# Patient Record
Sex: MALE | Race: WHITE | NOT HISPANIC OR LATINO | Employment: OTHER | URBAN - METROPOLITAN AREA
[De-identification: names, ages, dates, MRNs, and addresses within clinical notes are randomized per-mention and may not be internally consistent; named-entity substitution may affect disease eponyms.]

---

## 2017-02-21 ENCOUNTER — AMBULATORY - HEALTHEAST (OUTPATIENT)
Dept: CARDIOLOGY | Facility: CLINIC | Age: 72
End: 2017-02-21

## 2017-02-21 DIAGNOSIS — Z95.0 CARDIAC PACEMAKER IN SITU: ICD-10-CM

## 2017-05-04 ENCOUNTER — AMBULATORY - HEALTHEAST (OUTPATIENT)
Dept: CARDIOLOGY | Facility: CLINIC | Age: 72
End: 2017-05-04

## 2017-05-04 ENCOUNTER — OFFICE VISIT - HEALTHEAST (OUTPATIENT)
Dept: CARDIOLOGY | Facility: CLINIC | Age: 72
End: 2017-05-04

## 2017-05-04 DIAGNOSIS — I48.21 PERMANENT ATRIAL FIBRILLATION (H): ICD-10-CM

## 2017-05-04 DIAGNOSIS — Z95.0 CARDIAC PACEMAKER IN SITU: ICD-10-CM

## 2017-05-04 DIAGNOSIS — I35.1 AORTIC VALVE INSUFFICIENCY, UNSPECIFIED ETIOLOGY: ICD-10-CM

## 2017-05-04 DIAGNOSIS — Z95.2 S/P AVR (AORTIC VALVE REPLACEMENT): ICD-10-CM

## 2017-05-04 ASSESSMENT — MIFFLIN-ST. JEOR: SCORE: 1750.51

## 2017-05-12 ENCOUNTER — HOSPITAL ENCOUNTER (OUTPATIENT)
Dept: CARDIOLOGY | Facility: HOSPITAL | Age: 72
Discharge: HOME | End: 2017-05-12
Attending: INTERNAL MEDICINE

## 2017-05-12 DIAGNOSIS — Z95.2 S/P AVR (AORTIC VALVE REPLACEMENT): ICD-10-CM

## 2017-05-12 DIAGNOSIS — I48.21 PERMANENT ATRIAL FIBRILLATION (H): ICD-10-CM

## 2017-05-12 DIAGNOSIS — I35.1 AORTIC VALVE INSUFFICIENCY, UNSPECIFIED ETIOLOGY: ICD-10-CM

## 2017-05-12 LAB
AORTIC ROOT: 4.1 CM
AORTIC VALVE MEAN VELOCITY: 141 CM/S
AR DECEL SLOPE: 2950 MM/S2
AR PEAK VELOCITY: 416 CM/S
AV DIMENSIONLESS INDEX VTI: 0.5
AV MEAN GRADIENT: 9 MMHG
AV PEAK GRADIENT: 13.4 MMHG
AV REGURGITANT PEAK GRADIENT: 69.2 MMHG
AV REGURGITATION PRESSURE HALF TIME: 413 MS
AV VALVE AREA: 1.8 CM2
AV VELOCITY RATIO: 0.5
BSA FOR ECHO PROCEDURE: 2.23 M2
CV BLOOD PRESSURE: NORMAL MMHG
CV ECHO HEIGHT: 65 IN
CV ECHO WEIGHT: 240 LBS
DOP CALC AO PEAK VEL: 183 CM/S
DOP CALC AO VTI: 41.2 CM
DOP CALC LVOT AREA: 3.8 CM2
DOP CALC LVOT DIAMETER: 2.2 CM
DOP CALC LVOT PEAK VEL: 84.2 CM/S
DOP CALC LVOT STROKE VOLUME: 74.5 CM3
DOP CALCLVOT PEAK VEL VTI: 19.6 CM
EJECTION FRACTION: 65 % (ref 55–75)
FRACTIONAL SHORTENING: 37.3 % (ref 28–44)
INTERVENTRICULAR SEPTUM IN END DIASTOLE: 1.83 CM (ref 0.6–1)
IVS/PW RATIO: 1
LA AREA 1: 26.6 CM2
LA AREA 2: 27.8 CM2
LEFT ATRIUM LENGTH: 6.2 CM
LEFT ATRIUM SIZE: 5.4 CM
LEFT ATRIUM TO AORTIC ROOT RATIO: 1.32 NO UNITS
LEFT ATRIUM VOLUME INDEX: 45.5 ML/M2
LEFT ATRIUM VOLUME: 101.4 CM3
LEFT VENTRICLE CARDIAC INDEX: 2 L/MIN/M2
LEFT VENTRICLE CARDIAC OUTPUT: 4.5 L/MIN
LEFT VENTRICLE DIASTOLIC VOLUME INDEX: 95.5 CM3/M2 (ref 34–74)
LEFT VENTRICLE DIASTOLIC VOLUME: 213 CM3 (ref 62–150)
LEFT VENTRICLE HEART RATE: 60 BPM
LEFT VENTRICLE MASS INDEX: 238.5 G/M2
LEFT VENTRICLE SYSTOLIC VOLUME INDEX: 33.6 CM3/M2 (ref 11–31)
LEFT VENTRICLE SYSTOLIC VOLUME: 74.9 CM3 (ref 21–61)
LEFT VENTRICULAR INTERNAL DIMENSION IN DIASTOLE: 5.69 CM (ref 4.2–5.8)
LEFT VENTRICULAR INTERNAL DIMENSION IN SYSTOLE: 3.57 CM (ref 2.5–4)
LEFT VENTRICULAR MASS: 531.8 G
LEFT VENTRICULAR OUTFLOW TRACT MEAN GRADIENT: 2 MMHG
LEFT VENTRICULAR OUTFLOW TRACT MEAN VELOCITY: 59.6 CM/S
LEFT VENTRICULAR OUTFLOW TRACT PEAK GRADIENT: 3 MMHG
LEFT VENTRICULAR POSTERIOR WALL IN END DIASTOLE: 1.85 CM (ref 0.6–1)
LV STROKE VOLUME INDEX: 33.4 ML/M2
MITRAL VALVE E/A RATIO: 3.8
MV AVERAGE E/E' RATIO: 18.6 CM/S
MV DECELERATION TIME: 204 MS
MV E'TISSUE VEL-LAT: 11 CM/S
MV E'TISSUE VEL-MED: 6.67 CM/S
MV LATERAL E/E' RATIO: 14.9
MV MEDIAL E/E' RATIO: 24.6
MV PEAK A VELOCITY: 42.6 CM/S
MV PEAK E VELOCITY: 164 CM/S
NUC REST DIASTOLIC VOLUME INDEX: 3840 LBS
NUC REST SYSTOLIC VOLUME INDEX: 65 IN
RIGHT VENTRICULAR INTERNAL DIMENSION IN DYSTOLE: 3.09 CM

## 2017-05-12 ASSESSMENT — MIFFLIN-ST. JEOR: SCORE: 1750.51

## 2017-08-01 ENCOUNTER — AMBULATORY - HEALTHEAST (OUTPATIENT)
Dept: CARDIOLOGY | Facility: CLINIC | Age: 72
End: 2017-08-01

## 2017-08-01 DIAGNOSIS — Z95.0 CARDIAC PACEMAKER IN SITU: ICD-10-CM

## 2017-08-01 LAB — HCC DEVICE COMMENTS: NORMAL

## 2017-11-08 ENCOUNTER — AMBULATORY - HEALTHEAST (OUTPATIENT)
Dept: CARDIOLOGY | Facility: CLINIC | Age: 72
End: 2017-11-08

## 2017-11-08 DIAGNOSIS — Z95.0 PACEMAKER: ICD-10-CM

## 2017-11-08 LAB — HCC DEVICE COMMENTS: NORMAL

## 2018-02-17 ENCOUNTER — AMBULATORY - HEALTHEAST (OUTPATIENT)
Dept: CARDIOLOGY | Facility: CLINIC | Age: 73
End: 2018-02-17

## 2018-02-17 DIAGNOSIS — Z95.0 CARDIAC PACEMAKER IN SITU: ICD-10-CM

## 2018-02-20 LAB — HCC DEVICE COMMENTS: NORMAL

## 2018-05-17 ENCOUNTER — OFFICE VISIT - HEALTHEAST (OUTPATIENT)
Dept: CARDIOLOGY | Facility: CLINIC | Age: 73
End: 2018-05-17

## 2018-05-17 ENCOUNTER — AMBULATORY - HEALTHEAST (OUTPATIENT)
Dept: CARDIOLOGY | Facility: CLINIC | Age: 73
End: 2018-05-17

## 2018-05-17 DIAGNOSIS — I35.1 AORTIC INSUFFICIENCY: ICD-10-CM

## 2018-05-17 DIAGNOSIS — Z95.0 CARDIAC PACEMAKER IN SITU: ICD-10-CM

## 2018-05-17 DIAGNOSIS — Z95.2 S/P AVR (AORTIC VALVE REPLACEMENT): ICD-10-CM

## 2018-05-17 DIAGNOSIS — I48.21 PERMANENT ATRIAL FIBRILLATION (H): ICD-10-CM

## 2018-05-17 LAB
ERYTHROCYTE [DISTWIDTH] IN BLOOD BY AUTOMATED COUNT: 14.6 % (ref 11–14.5)
HCT VFR BLD AUTO: 41.2 % (ref 40–54)
HGB BLD-MCNC: 14 G/DL (ref 14–18)
MCH RBC QN AUTO: 31.7 PG (ref 27–34)
MCHC RBC AUTO-ENTMCNC: 34 G/DL (ref 32–36)
MCV RBC AUTO: 93 FL (ref 80–100)
PLATELET # BLD AUTO: 202 THOU/UL (ref 140–440)
PMV BLD AUTO: 10.5 FL (ref 8.5–12.5)
RBC # BLD AUTO: 4.42 MILL/UL (ref 4.4–6.2)
WBC: 7.3 THOU/UL (ref 4–11)

## 2018-05-17 ASSESSMENT — MIFFLIN-ST. JEOR: SCORE: 1709.69

## 2018-06-14 ENCOUNTER — HOSPITAL ENCOUNTER (OUTPATIENT)
Dept: CARDIOLOGY | Facility: HOSPITAL | Age: 73
Discharge: HOME | End: 2018-06-14
Attending: INTERNAL MEDICINE

## 2018-06-14 DIAGNOSIS — Z95.2 S/P AVR (AORTIC VALVE REPLACEMENT): ICD-10-CM

## 2018-06-14 DIAGNOSIS — I35.1 AORTIC INSUFFICIENCY: ICD-10-CM

## 2018-06-14 ASSESSMENT — MIFFLIN-ST. JEOR: SCORE: 1709.69

## 2018-06-15 LAB
AORTIC ROOT: 4.9 CM
AORTIC VALVE MEAN VELOCITY: 148 CM/S
AR DECEL SLOPE: 2890 MM/S2
AR PEAK VELOCITY: 407 CM/S
ASCENDING AORTA: 5 CM
AV DIMENSIONLESS INDEX VTI: 0.5
AV MEAN GRADIENT: 10 MMHG
AV PEAK GRADIENT: 21 MMHG
AV REGURGITANT PEAK GRADIENT: 66.3 MMHG
AV REGURGITATION PRESSURE HALF TIME: 418 MS
AV VALVE AREA: 2 CM2
BSA FOR ECHO PROCEDURE: 2.19 M2
CV BLOOD PRESSURE: NORMAL MMHG
CV ECHO HEIGHT: 65 IN
CV ECHO WEIGHT: 231 LBS
DOP CALC AO PEAK VEL: 229 CM/S
DOP CALC AO VTI: 34.8 CM
DOP CALC LVOT AREA: 4.15 CM2
DOP CALC LVOT DIAMETER: 2.3 CM
DOP CALC LVOT STROKE VOLUME: 71 CM3
DOP CALCLVOT PEAK VEL VTI: 17.1 CM
ECHO EJECTION FRACTION ESTIMATED: 55 %
EJECTION FRACTION: 53 % (ref 55–75)
FRACTIONAL SHORTENING: 38.7 % (ref 28–44)
INTERVENTRICULAR SEPTUM IN END DIASTOLE: 1.2 CM (ref 0.6–1)
IVS/PW RATIO: 0.9
LA AREA 1: 27.1 CM2
LA AREA 2: 26.2 CM2
LEFT ATRIUM LENGTH: 6.6 CM
LEFT ATRIUM SIZE: 5.7 CM
LEFT ATRIUM TO AORTIC ROOT RATIO: 1.02 NO UNITS
LEFT ATRIUM VOLUME INDEX: 41.8 ML/M2
LEFT ATRIUM VOLUME: 91.4 ML
LEFT VENTRICLE CARDIAC INDEX: 1.9 L/MIN/M2
LEFT VENTRICLE CARDIAC OUTPUT: 4.3 L/MIN
LEFT VENTRICLE DIASTOLIC VOLUME INDEX: 79 CM3/M2 (ref 34–74)
LEFT VENTRICLE DIASTOLIC VOLUME: 173 CM3 (ref 62–150)
LEFT VENTRICLE HEART RATE: 60 BPM
LEFT VENTRICLE MASS INDEX: 159.9 G/M2
LEFT VENTRICLE SYSTOLIC VOLUME INDEX: 37 CM3/M2 (ref 11–31)
LEFT VENTRICLE SYSTOLIC VOLUME: 81 CM3 (ref 21–61)
LEFT VENTRICULAR INTERNAL DIMENSION IN DIASTOLE: 6.2 CM (ref 4.2–5.8)
LEFT VENTRICULAR INTERNAL DIMENSION IN SYSTOLE: 3.8 CM (ref 2.5–4)
LEFT VENTRICULAR MASS: 350.2 G
LEFT VENTRICULAR OUTFLOW TRACT MEAN GRADIENT: 1 MMHG
LEFT VENTRICULAR OUTFLOW TRACT MEAN VELOCITY: 57.9 CM/S
LEFT VENTRICULAR POSTERIOR WALL IN END DIASTOLE: 1.3 CM (ref 0.6–1)
LV STROKE VOLUME INDEX: 32.4 ML/M2
MITRAL VALVE E/A RATIO: 3.1
MV AVERAGE E/E' RATIO: 23.2 CM/S
MV DECELERATION TIME: 204 MS
MV E'TISSUE VEL-LAT: 6.75 CM/S
MV E'TISSUE VEL-MED: 4.81 CM/S
MV LATERAL E/E' RATIO: 19.9
MV MEDIAL E/E' RATIO: 27.9
MV PEAK A VELOCITY: 43.3 CM/S
MV PEAK E VELOCITY: 134 CM/S
NUC REST DIASTOLIC VOLUME INDEX: 3696 LBS
NUC REST SYSTOLIC VOLUME INDEX: 65 IN
TRICUSPID REGURGITATION PEAK PRESSURE GRADIENT: 15.8 MMHG
TRICUSPID VALVE PEAK REGURGITANT VELOCITY: 199 CM/S

## 2018-06-18 ENCOUNTER — AMBULATORY - HEALTHEAST (OUTPATIENT)
Dept: CARDIOLOGY | Facility: CLINIC | Age: 73
End: 2018-06-18

## 2018-06-18 DIAGNOSIS — R93.89 ABNORMAL FINDINGS ON DIAGNOSTIC IMAGING OF OTHER SPECIFIED BODY STRUCTURES: ICD-10-CM

## 2018-06-18 DIAGNOSIS — I77.89 ENLARGED AORTA (H): ICD-10-CM

## 2018-06-22 ENCOUNTER — COMMUNICATION - HEALTHEAST (OUTPATIENT)
Dept: CARDIOLOGY | Facility: CLINIC | Age: 73
End: 2018-06-22

## 2018-06-22 DIAGNOSIS — I77.89 ASCENDING AORTA ENLARGEMENT (H): ICD-10-CM

## 2018-06-22 DIAGNOSIS — Z51.81 ENCOUNTER FOR THERAPEUTIC DRUG MONITORING: ICD-10-CM

## 2018-06-27 ENCOUNTER — AMBULATORY - HEALTHEAST (OUTPATIENT)
Dept: CARDIOLOGY | Facility: CLINIC | Age: 73
End: 2018-06-27

## 2018-07-05 ENCOUNTER — HOSPITAL ENCOUNTER (OUTPATIENT)
Dept: CT IMAGING | Facility: HOSPITAL | Age: 73
Discharge: HOME | End: 2018-07-05
Attending: INTERNAL MEDICINE

## 2018-07-05 DIAGNOSIS — I77.89 ASCENDING AORTA ENLARGEMENT (H): ICD-10-CM

## 2018-07-05 LAB — CREAT BLD-MCNC: 1.2 MG/DL

## 2018-08-15 ENCOUNTER — AMBULATORY - HEALTHEAST (OUTPATIENT)
Dept: CARDIOLOGY | Facility: CLINIC | Age: 73
End: 2018-08-15

## 2018-08-15 DIAGNOSIS — Z95.0 PACEMAKER: ICD-10-CM

## 2018-08-15 LAB
HCC DEVICE COMMENTS: NORMAL
HCC DEVICE IMPLANTING PROVIDER: NORMAL
HCC DEVICE MANUFACTURE: NORMAL
HCC DEVICE MODEL: NORMAL
HCC DEVICE SERIAL NUMBER: NORMAL
HCC DEVICE TYPE: NORMAL

## 2018-10-25 ENCOUNTER — COMMUNICATION - HEALTHEAST (OUTPATIENT)
Dept: ADMINISTRATIVE | Facility: CLINIC | Age: 73
End: 2018-10-25

## 2018-11-20 ENCOUNTER — AMBULATORY - HEALTHEAST (OUTPATIENT)
Dept: CARDIOLOGY | Facility: CLINIC | Age: 73
End: 2018-11-20

## 2018-11-20 DIAGNOSIS — Z95.0 CARDIAC PACEMAKER IN SITU: ICD-10-CM

## 2019-02-26 ENCOUNTER — AMBULATORY - HEALTHEAST (OUTPATIENT)
Dept: CARDIOLOGY | Facility: CLINIC | Age: 74
End: 2019-02-26

## 2019-02-26 DIAGNOSIS — Z95.0 PACEMAKER: ICD-10-CM

## 2019-05-29 ENCOUNTER — AMBULATORY - HEALTHEAST (OUTPATIENT)
Dept: CARDIOLOGY | Facility: CLINIC | Age: 74
End: 2019-05-29

## 2019-05-29 DIAGNOSIS — Z95.0 PACEMAKER: ICD-10-CM

## 2019-05-29 ASSESSMENT — MIFFLIN-ST. JEOR: SCORE: 1628.04

## 2019-08-22 ENCOUNTER — AMBULATORY - HEALTHEAST (OUTPATIENT)
Dept: CARDIOLOGY | Facility: CLINIC | Age: 74
End: 2019-08-22

## 2019-08-22 DIAGNOSIS — Z95.0 PACEMAKER: ICD-10-CM

## 2019-11-25 ENCOUNTER — AMBULATORY - HEALTHEAST (OUTPATIENT)
Dept: CARDIOLOGY | Facility: CLINIC | Age: 74
End: 2019-11-25

## 2019-11-25 DIAGNOSIS — I44.2 ATRIOVENTRICULAR BLOCK, COMPLETE (H): ICD-10-CM

## 2019-11-25 DIAGNOSIS — Z95.0 PACEMAKER: ICD-10-CM

## 2019-11-27 ENCOUNTER — OFFICE VISIT - HEALTHEAST (OUTPATIENT)
Dept: CARDIOLOGY | Facility: CLINIC | Age: 74
End: 2019-11-27

## 2019-11-27 DIAGNOSIS — I35.1 AORTIC VALVE INSUFFICIENCY, ETIOLOGY OF CARDIAC VALVE DISEASE UNSPECIFIED: ICD-10-CM

## 2019-11-27 DIAGNOSIS — Z95.2 S/P AVR (AORTIC VALVE REPLACEMENT): ICD-10-CM

## 2019-11-27 DIAGNOSIS — I44.2 ATRIOVENTRICULAR BLOCK, COMPLETE (H): ICD-10-CM

## 2019-11-27 DIAGNOSIS — I48.21 PERMANENT ATRIAL FIBRILLATION (H): ICD-10-CM

## 2019-11-27 DIAGNOSIS — Z95.0 CARDIAC PACEMAKER IN SITU: ICD-10-CM

## 2019-11-27 ASSESSMENT — MIFFLIN-ST. JEOR: SCORE: 1564.54

## 2019-11-29 ENCOUNTER — AMBULATORY - HEALTHEAST (OUTPATIENT)
Dept: CARDIOLOGY | Facility: CLINIC | Age: 74
End: 2019-11-29

## 2019-12-03 ENCOUNTER — COMMUNICATION - HEALTHEAST (OUTPATIENT)
Dept: CARDIOLOGY | Facility: CLINIC | Age: 74
End: 2019-12-03

## 2019-12-03 DIAGNOSIS — R60.0 PERIPHERAL EDEMA: ICD-10-CM

## 2019-12-11 ENCOUNTER — AMBULATORY - HEALTHEAST (OUTPATIENT)
Dept: CARDIOLOGY | Facility: CLINIC | Age: 74
End: 2019-12-11

## 2019-12-11 DIAGNOSIS — I44.2 ATRIOVENTRICULAR BLOCK, COMPLETE (H): ICD-10-CM

## 2019-12-11 DIAGNOSIS — Z95.0 PACEMAKER: ICD-10-CM

## 2019-12-11 RX ORDER — LISINOPRIL AND HYDROCHLOROTHIAZIDE 12.5; 2 MG/1; MG/1
1 TABLET ORAL DAILY
Refills: 0 | Status: SHIPPED | COMMUNITY
Start: 2019-10-09

## 2019-12-11 ASSESSMENT — MIFFLIN-ST. JEOR: SCORE: 1569.07

## 2019-12-17 ENCOUNTER — HOSPITAL ENCOUNTER (OUTPATIENT)
Dept: CARDIOLOGY | Facility: CLINIC | Age: 74
Discharge: HOME | End: 2019-12-17
Attending: INTERNAL MEDICINE

## 2019-12-17 DIAGNOSIS — Z95.2 S/P AVR (AORTIC VALVE REPLACEMENT): ICD-10-CM

## 2019-12-17 DIAGNOSIS — I35.1 AORTIC VALVE INSUFFICIENCY, ETIOLOGY OF CARDIAC VALVE DISEASE UNSPECIFIED: ICD-10-CM

## 2019-12-17 DIAGNOSIS — I48.21 PERMANENT ATRIAL FIBRILLATION (H): ICD-10-CM

## 2019-12-17 ASSESSMENT — MIFFLIN-ST. JEOR: SCORE: 1569.07

## 2019-12-18 LAB
AORTIC ROOT: 5.5 CM
AORTIC VALVE MEAN VELOCITY: 127 CM/S
AR DECEL SLOPE: 2740 MM/S2
AR PEAK VELOCITY: 395 CM/S
ASCENDING AORTA: 5.1 CM
AV DIMENSIONLESS INDEX VTI: 0.6
AV MEAN GRADIENT: 8 MMHG
AV PEAK GRADIENT: 14.9 MMHG
AV REGURGITANT PEAK GRADIENT: 62.4 MMHG
AV REGURGITATION PRESSURE HALF TIME: 430 MS
AV VALVE AREA: 1.7 CM2
AV VELOCITY RATIO: 0.5
BSA FOR ECHO PROCEDURE: 2.04 M2
CV BLOOD PRESSURE: ABNORMAL MMHG
CV ECHO HEIGHT: 65 IN
CV ECHO WEIGHT: 200 LBS
DOP CALC AO PEAK VEL: 193 CM/S
DOP CALC AO VTI: 36.9 CM
DOP CALC LVOT AREA: 2.83 CM2
DOP CALC LVOT DIAMETER: 1.9 CM
DOP CALC LVOT PEAK VEL: 96.1 CM/S
DOP CALC LVOT STROKE VOLUME: 64.3 CM3
DOP CALCLVOT PEAK VEL VTI: 22.7 CM
EJECTION FRACTION: 47 % (ref 55–75)
FRACTIONAL SHORTENING: 34.4 % (ref 28–44)
INTERVENTRICULAR SEPTUM IN END DIASTOLE: 1.34 CM (ref 0.6–1)
IVS/PW RATIO: 1.1
LA AREA 1: 34.6 CM2
LA AREA 2: 33 CM2
LEFT ATRIUM LENGTH: 7.26 CM
LEFT ATRIUM SIZE: 4.4 CM
LEFT ATRIUM SIZE: 4.4 CM
LEFT ATRIUM TO AORTIC ROOT RATIO: 0.9 NO UNITS
LEFT ATRIUM VOLUME INDEX: 65.5 ML/M2
LEFT ATRIUM VOLUME: 133.7 ML
LEFT VENTRICLE CARDIAC INDEX: 2.1 L/MIN/M2
LEFT VENTRICLE CARDIAC OUTPUT: 4.2 L/MIN
LEFT VENTRICLE DIASTOLIC VOLUME INDEX: 132.8 CM3/M2 (ref 34–74)
LEFT VENTRICLE DIASTOLIC VOLUME: 271 CM3 (ref 62–150)
LEFT VENTRICLE HEART RATE: 66 BPM
LEFT VENTRICLE MASS INDEX: 169.8 G/M2
LEFT VENTRICLE SYSTOLIC VOLUME INDEX: 70.6 CM3/M2 (ref 11–31)
LEFT VENTRICLE SYSTOLIC VOLUME: 144 CM3 (ref 21–61)
LEFT VENTRICULAR INTERNAL DIMENSION IN DIASTOLE: 5.98 CM (ref 4.2–5.8)
LEFT VENTRICULAR INTERNAL DIMENSION IN SYSTOLE: 3.92 CM (ref 2.5–4)
LEFT VENTRICULAR MASS: 346.4 G
LEFT VENTRICULAR OUTFLOW TRACT MEAN GRADIENT: 2 MMHG
LEFT VENTRICULAR OUTFLOW TRACT MEAN VELOCITY: 64.1 CM/S
LEFT VENTRICULAR OUTFLOW TRACT PEAK GRADIENT: 4 MMHG
LEFT VENTRICULAR POSTERIOR WALL IN END DIASTOLE: 1.25 CM (ref 0.6–1)
LV STROKE VOLUME INDEX: 31.5 ML/M2
MITRAL REGURGITANT VELOCITY TIME INTEGRAL: 187 CM
MR FLOW: 43 CM3
MR MEAN GRADIENT: 78 MMHG
MR MEAN VELOCITY: 415 CM/S
MR PEAK GRADIENT: 117.9 MMHG
MR PISA EROA: 0.2 CM2
MR PISA RADIUS: 0.8 CM
MR PISA VN NYQUIST: 30.8 CM/S
MV AVERAGE E/E' RATIO: 20.3 CM/S
MV DECELERATION TIME: 158 MS
MV E'TISSUE VEL-LAT: 9.16 CM/S
MV E'TISSUE VEL-MED: 5.65 CM/S
MV LATERAL E/E' RATIO: 16.4
MV MEDIAL E/E' RATIO: 26.5
MV PEAK E VELOCITY: 150 CM/S
MV REGURGITANT VOLUME: 42.6 CC
NUC REST DIASTOLIC VOLUME INDEX: 3200 LBS
NUC REST SYSTOLIC VOLUME INDEX: 65 IN
PISA MR PEAK VEL: 543 CM/S
PR MAX PG: 3 MMHG
PR PEAK VELOCITY: 90.9 CM/S
TRICUSPID REGURGITATION PEAK PRESSURE GRADIENT: 42 MMHG
TRICUSPID VALVE ANULAR PLANE SYSTOLIC EXCURSION: 1.2 CM
TRICUSPID VALVE PEAK REGURGITANT VELOCITY: 324 CM/S

## 2019-12-24 ENCOUNTER — COMMUNICATION - HEALTHEAST (OUTPATIENT)
Dept: CARDIOLOGY | Facility: CLINIC | Age: 74
End: 2019-12-24

## 2019-12-24 DIAGNOSIS — I77.89 ASCENDING AORTA ENLARGEMENT (H): ICD-10-CM

## 2020-01-03 ENCOUNTER — HOSPITAL ENCOUNTER (OUTPATIENT)
Dept: CT IMAGING | Facility: CLINIC | Age: 75
Discharge: HOME | End: 2020-01-03
Attending: INTERNAL MEDICINE

## 2020-01-03 DIAGNOSIS — I77.89 ASCENDING AORTA ENLARGEMENT (H): ICD-10-CM

## 2020-01-03 LAB
CREAT BLD-MCNC: 1 MG/DL (ref 0.7–1.3)
GFR SERPL CREATININE-BSD FRML MDRD: >60 ML/MIN/1.73M2

## 2020-02-06 ENCOUNTER — AMBULATORY - HEALTHEAST (OUTPATIENT)
Dept: CARDIOLOGY | Facility: CLINIC | Age: 75
End: 2020-02-06

## 2020-02-06 ENCOUNTER — COMMUNICATION - HEALTHEAST (OUTPATIENT)
Dept: CARDIOLOGY | Facility: CLINIC | Age: 75
End: 2020-02-06

## 2020-02-06 DIAGNOSIS — I47.29 NSVT (NONSUSTAINED VENTRICULAR TACHYCARDIA) (H): ICD-10-CM

## 2020-02-06 DIAGNOSIS — Z95.0 PACEMAKER: ICD-10-CM

## 2020-02-06 DIAGNOSIS — I44.2 ATRIOVENTRICULAR BLOCK, COMPLETE (H): ICD-10-CM

## 2020-02-07 ENCOUNTER — AMBULATORY - HEALTHEAST (OUTPATIENT)
Dept: CARDIOLOGY | Facility: CLINIC | Age: 75
End: 2020-02-07

## 2020-02-14 ENCOUNTER — AMBULATORY - HEALTHEAST (OUTPATIENT)
Dept: CARDIOLOGY | Facility: CLINIC | Age: 75
End: 2020-02-14

## 2020-02-25 ENCOUNTER — AMBULATORY - HEALTHEAST (OUTPATIENT)
Dept: CARDIOLOGY | Facility: CLINIC | Age: 75
End: 2020-02-25

## 2020-02-25 DIAGNOSIS — Z95.0 PACEMAKER: ICD-10-CM

## 2020-02-25 DIAGNOSIS — I44.2 ATRIOVENTRICULAR BLOCK, COMPLETE (H): ICD-10-CM

## 2020-02-26 ENCOUNTER — AMBULATORY - HEALTHEAST (OUTPATIENT)
Dept: CARDIOLOGY | Facility: CLINIC | Age: 75
End: 2020-02-26

## 2020-04-28 ENCOUNTER — AMBULATORY - HEALTHEAST (OUTPATIENT)
Dept: CARDIOLOGY | Facility: CLINIC | Age: 75
End: 2020-04-28

## 2020-05-27 ENCOUNTER — AMBULATORY - HEALTHEAST (OUTPATIENT)
Dept: CARDIOLOGY | Facility: CLINIC | Age: 75
End: 2020-05-27

## 2020-05-27 DIAGNOSIS — I48.21 PERMANENT ATRIAL FIBRILLATION (H): ICD-10-CM

## 2020-05-27 DIAGNOSIS — I44.2 ATRIOVENTRICULAR BLOCK, COMPLETE (H): ICD-10-CM

## 2020-05-27 DIAGNOSIS — Z95.0 PACEMAKER: ICD-10-CM

## 2020-05-27 DIAGNOSIS — I47.29 NSVT (NONSUSTAINED VENTRICULAR TACHYCARDIA) (H): ICD-10-CM

## 2020-06-10 ENCOUNTER — AMBULATORY - HEALTHEAST (OUTPATIENT)
Dept: CARDIOLOGY | Facility: CLINIC | Age: 75
End: 2020-06-10

## 2020-08-24 ENCOUNTER — AMBULATORY - HEALTHEAST (OUTPATIENT)
Dept: CARDIOLOGY | Facility: CLINIC | Age: 75
End: 2020-08-24

## 2020-08-31 ENCOUNTER — AMBULATORY - HEALTHEAST (OUTPATIENT)
Dept: CARDIOLOGY | Facility: CLINIC | Age: 75
End: 2020-08-31

## 2020-08-31 DIAGNOSIS — Z95.0 PACEMAKER: ICD-10-CM

## 2020-08-31 DIAGNOSIS — I44.2 ATRIOVENTRICULAR BLOCK, COMPLETE (H): ICD-10-CM

## 2020-09-28 ENCOUNTER — AMBULATORY - HEALTHEAST (OUTPATIENT)
Dept: CARDIOLOGY | Facility: CLINIC | Age: 75
End: 2020-09-28

## 2020-10-01 ENCOUNTER — AMBULATORY - HEALTHEAST (OUTPATIENT)
Dept: CARDIOLOGY | Facility: CLINIC | Age: 75
End: 2020-10-01

## 2020-10-15 ENCOUNTER — AMBULATORY - HEALTHEAST (OUTPATIENT)
Dept: CARDIOLOGY | Facility: CLINIC | Age: 75
End: 2020-10-15

## 2020-11-11 ENCOUNTER — AMBULATORY - HEALTHEAST (OUTPATIENT)
Dept: CARDIOLOGY | Facility: CLINIC | Age: 75
End: 2020-11-11

## 2020-12-01 ENCOUNTER — AMBULATORY - HEALTHEAST (OUTPATIENT)
Dept: CARDIOLOGY | Facility: CLINIC | Age: 75
End: 2020-12-01

## 2020-12-01 DIAGNOSIS — Z95.0 PACEMAKER: ICD-10-CM

## 2020-12-01 DIAGNOSIS — I44.2 ATRIOVENTRICULAR BLOCK, COMPLETE (H): ICD-10-CM

## 2021-03-02 ENCOUNTER — AMBULATORY - HEALTHEAST (OUTPATIENT)
Dept: CARDIOLOGY | Facility: CLINIC | Age: 76
End: 2021-03-02

## 2021-03-02 DIAGNOSIS — I44.2 ATRIOVENTRICULAR BLOCK, COMPLETE (H): ICD-10-CM

## 2021-03-02 DIAGNOSIS — Z95.0 PACEMAKER: ICD-10-CM

## 2021-04-28 ENCOUNTER — COMMUNICATION - HEALTHEAST (OUTPATIENT)
Dept: CARDIOLOGY | Facility: CLINIC | Age: 76
End: 2021-04-28

## 2021-05-13 ENCOUNTER — COMMUNICATION - HEALTHEAST (OUTPATIENT)
Dept: CARDIOLOGY | Facility: CLINIC | Age: 76
End: 2021-05-13

## 2021-05-31 VITALS — HEIGHT: 65 IN | BODY MASS INDEX: 39.99 KG/M2 | WEIGHT: 240 LBS

## 2021-05-31 VITALS — WEIGHT: 240 LBS | BODY MASS INDEX: 39.99 KG/M2 | HEIGHT: 65 IN

## 2021-06-01 VITALS — BODY MASS INDEX: 38.49 KG/M2 | HEIGHT: 65 IN | WEIGHT: 231 LBS

## 2021-06-02 ENCOUNTER — AMBULATORY - HEALTHEAST (OUTPATIENT)
Dept: CARDIOLOGY | Facility: CLINIC | Age: 76
End: 2021-06-02

## 2021-06-02 DIAGNOSIS — I44.2 ATRIOVENTRICULAR BLOCK, COMPLETE (H): ICD-10-CM

## 2021-06-02 DIAGNOSIS — Z95.0 PACEMAKER: ICD-10-CM

## 2021-06-03 VITALS — BODY MASS INDEX: 35.49 KG/M2 | HEIGHT: 65 IN | WEIGHT: 213 LBS

## 2021-06-03 NOTE — TELEPHONE ENCOUNTER
----- Message from Jhony Campos MD sent at 11/27/2019  2:48 PM CST -----  Priti,  Is it possible to send a prescription in for compressions stockings.  They are wondering if they can ask the insurance to help with these.  Thanks,  Jhony

## 2021-06-04 VITALS
DIASTOLIC BLOOD PRESSURE: 60 MMHG | BODY MASS INDEX: 33.15 KG/M2 | HEART RATE: 72 BPM | HEIGHT: 65 IN | WEIGHT: 199 LBS | RESPIRATION RATE: 16 BRPM | SYSTOLIC BLOOD PRESSURE: 132 MMHG

## 2021-06-04 VITALS
HEART RATE: 60 BPM | WEIGHT: 200 LBS | RESPIRATION RATE: 16 BRPM | SYSTOLIC BLOOD PRESSURE: 148 MMHG | DIASTOLIC BLOOD PRESSURE: 60 MMHG | BODY MASS INDEX: 33.32 KG/M2 | HEIGHT: 65 IN

## 2021-06-04 VITALS — WEIGHT: 200 LBS | BODY MASS INDEX: 33.32 KG/M2 | HEIGHT: 65 IN

## 2021-06-04 NOTE — TELEPHONE ENCOUNTER
----- Message from Jhony Campos MD sent at 12/20/2019  1:11 PM CST -----  Priti,    Echocardiogram looks stable.  There is a question of whether the a sending aorta is enlarged and I think we should repeat a CT angiogram of his thoracic aorta.    Thanks,    Jhony      Called patient and reviewed results and recommendations per Dr. Campos. Patient verbalized understanding and agreement with plan.   Order for CTA Chest placed. -ejb

## 2021-06-04 NOTE — PROGRESS NOTES
In clinic device check with Device RN; rate response reprogramming per Dr. Campos.  Please see link for full device report.  Patient was informed of results and next follow up during today's visit.

## 2021-06-05 NOTE — TELEPHONE ENCOUNTER
Type: alert remote pacemaker transmission for ventricular high rate (VHR) episode detected.  Presenting rhythm: ventricular pacing and rare sensing, rate 60 bpm.  Battery/lead status: stable  Arrhythmias: since 12/11/19, one VHR today at 4:30AM, EGM suggests NSVT, 14 bts, rate 210 bpm.  Anticoagulant: warfarin  Comments: normal magnet and pacemaker function. Routed to device RN. Prd.     Transmission reviewed, agree with above. Hx CHB, pacemaker dependency. Last EF 47% per Echo 12/17/19. 1 NSVT recorded, ~14 beats early this morning. Reviewed with patient, was asleep at that time. Confirms Toprol  mg daily.  Advised to call with any episodes of light-headedness/near-syncope. Verbalized understanding.     Will review with Dr. Campos.  Ruth Wright RN

## 2021-06-05 NOTE — TELEPHONE ENCOUNTER
Order placed per protocol.    Phone call to patient - informed him and his wife of recommendations - patient stated he is scheduled to have INR drawn today at [facility] clinic - wife provided clinic #([phone]) - provided wife with return # for  clinic if orders are not rec'd via fax when patient arrives for appt.    Phone call to [facility] clinic - obtained fax # [phone].    Lab orders successfully faxed as requested.  mg

## 2021-06-08 ENCOUNTER — OFFICE VISIT - HEALTHEAST (OUTPATIENT)
Dept: CARDIOLOGY | Facility: CLINIC | Age: 76
End: 2021-06-08

## 2021-06-08 ENCOUNTER — AMBULATORY - HEALTHEAST (OUTPATIENT)
Dept: CARDIOLOGY | Facility: CLINIC | Age: 76
End: 2021-06-08

## 2021-06-08 DIAGNOSIS — R60.0 LOCALIZED EDEMA: ICD-10-CM

## 2021-06-08 DIAGNOSIS — I47.29 NSVT (NONSUSTAINED VENTRICULAR TACHYCARDIA) (H): ICD-10-CM

## 2021-06-08 DIAGNOSIS — I48.21 PERMANENT ATRIAL FIBRILLATION (H): ICD-10-CM

## 2021-06-08 DIAGNOSIS — I44.2 ATRIOVENTRICULAR BLOCK, COMPLETE (H): ICD-10-CM

## 2021-06-08 DIAGNOSIS — Z95.2 S/P AVR (AORTIC VALVE REPLACEMENT): ICD-10-CM

## 2021-06-08 DIAGNOSIS — Z95.0 PACEMAKER: ICD-10-CM

## 2021-06-08 DIAGNOSIS — I35.1 AORTIC VALVE INSUFFICIENCY, ETIOLOGY OF CARDIAC VALVE DISEASE UNSPECIFIED: ICD-10-CM

## 2021-06-08 DIAGNOSIS — I50.20 HEART FAILURE WITH REDUCED EJECTION FRACTION, NYHA CLASS III (H): ICD-10-CM

## 2021-06-08 LAB
ANION GAP SERPL CALCULATED.3IONS-SCNC: 10 MMOL/L (ref 5–18)
BNP SERPL-MCNC: 1264 PG/ML (ref 0–76)
BUN SERPL-MCNC: 26 MG/DL (ref 8–28)
CALCIUM SERPL-MCNC: 9.9 MG/DL (ref 8.5–10.5)
CHLORIDE BLD-SCNC: 105 MMOL/L (ref 98–107)
CO2 SERPL-SCNC: 25 MMOL/L (ref 22–31)
CREAT SERPL-MCNC: 1.25 MG/DL (ref 0.7–1.3)
ERYTHROCYTE [DISTWIDTH] IN BLOOD BY AUTOMATED COUNT: 16.3 % (ref 11–14.5)
GFR SERPL CREATININE-BSD FRML MDRD: 56 ML/MIN/1.73M2
GLUCOSE BLD-MCNC: 170 MG/DL (ref 70–125)
HCT VFR BLD AUTO: 39.4 % (ref 40–54)
HGB BLD-MCNC: 12.4 G/DL (ref 14–18)
INR PPP: 2.84 (ref 0.9–1.1)
MCH RBC QN AUTO: 31.2 PG (ref 27–34)
MCHC RBC AUTO-ENTMCNC: 31.5 G/DL (ref 32–36)
MCV RBC AUTO: 99 FL (ref 80–100)
PLATELET # BLD AUTO: 130 THOU/UL (ref 140–440)
PMV BLD AUTO: 11.2 FL (ref 8.5–12.5)
POTASSIUM BLD-SCNC: 4.3 MMOL/L (ref 3.5–5)
RBC # BLD AUTO: 3.98 MILL/UL (ref 4.4–6.2)
SODIUM SERPL-SCNC: 140 MMOL/L (ref 136–145)
WBC: 6.5 THOU/UL (ref 4–11)

## 2021-06-08 RX ORDER — FUROSEMIDE 20 MG
20 TABLET ORAL DAILY
Qty: 50 TABLET | Refills: 3 | Status: ON HOLD | OUTPATIENT
Start: 2021-06-08 | End: 2021-07-30

## 2021-06-09 ENCOUNTER — RECORDS - HEALTHEAST (OUTPATIENT)
Dept: ADMINISTRATIVE | Facility: CLINIC | Age: 76
End: 2021-06-09

## 2021-06-16 PROBLEM — R60.0 LOCALIZED EDEMA: Status: ACTIVE | Noted: 2021-06-08

## 2021-06-16 PROBLEM — I47.29 NSVT (NONSUSTAINED VENTRICULAR TACHYCARDIA) (H): Status: ACTIVE | Noted: 2020-05-27

## 2021-06-16 PROBLEM — I50.20 HEART FAILURE WITH REDUCED EJECTION FRACTION, NYHA CLASS III (H): Status: ACTIVE | Noted: 2021-06-08

## 2021-06-16 NOTE — TELEPHONE ENCOUNTER
Telephone Encounter by Freida Pacheco RN at 2/7/2020  9:24 AM     Author: Freida Pacheco RN Service: -- Author Type: Registered Nurse    Filed: 2/7/2020 10:09 AM Encounter Date: 2/6/2020 Status: Signed    : Freida Pacheco RN (Registered Nurse)       Message rec'd 2-7-20 @ 0744:  Jhony Campos MD Gebel, Mandy L, RN; Priti Santiago RN   Caller: Unspecified (Yesterday,  1:14 PM)             Priti,    Could he get a basic metabolic panel and a magnesium drawn.    Thanks,    Jhony

## 2021-06-18 ENCOUNTER — HOSPITAL ENCOUNTER (OUTPATIENT)
Dept: CARDIOLOGY | Facility: CLINIC | Age: 76
Discharge: HOME | End: 2021-06-18
Attending: INTERNAL MEDICINE
Payer: MEDICARE

## 2021-06-18 DIAGNOSIS — I50.20 HEART FAILURE WITH REDUCED EJECTION FRACTION, NYHA CLASS III (H): ICD-10-CM

## 2021-06-18 LAB
AORTIC ROOT: 4.4 CM
AORTIC VALVE MEAN VELOCITY: 130 CM/S
AR DECEL SLOPE: 2650 MM/S2
AR PEAK VELOCITY: 441 CM/S
ASCENDING AORTA: 5.1 CM
AV DIMENSIONLESS INDEX VTI: 0.6
AV MEAN GRADIENT: 7.5 MMHG
AV PEAK GRADIENT: 13 MMHG
AV REGURGITANT PEAK GRADIENT: 77.8 MMHG
AV REGURGITATION PRESSURE HALF TIME: 459 MS
AV VALVE AREA: 2.6 CM2
AV VELOCITY RATIO: 0.5
BSA FOR ECHO PROCEDURE: 2.1 M2
CV BLOOD PRESSURE: ABNORMAL MMHG
CV ECHO HEIGHT: 65 IN
CV ECHO WEIGHT: 213 LBS
DOP CALC AO PEAK VEL: 180 CM/S
DOP CALC AO VTI: 34.6 CM
DOP CALC LVOT AREA: 4.52 CM2
DOP CALC LVOT DIAMETER: 2.4 CM
DOP CALC LVOT PEAK VEL: 96.8 CM/S
DOP CALC LVOT STROKE VOLUME: 90.4 CM3
DOP CALCLVOT PEAK VEL VTI: 20 CM
EJECTION FRACTION: 56 % (ref 55–75)
FRACTIONAL SHORTENING: 35.6 % (ref 28–44)
INTERVENTRICULAR SEPTUM IN END DIASTOLE: 1.1 CM (ref 0.6–1)
IVS/PW RATIO: 0.8
LA AREA 1: 30.8 CM2
LA AREA 2: 32.9 CM2
LEFT ATRIUM AREA: 30.8 CM2
LEFT ATRIUM LENGTH: 6.7 CM
LEFT ATRIUM SIZE: 4.9 CM
LEFT ATRIUM TO AORTIC ROOT RATIO: 1.11 NO UNITS
LEFT ATRIUM VOLUME INDEX: 61.2 ML/M2
LEFT ATRIUM VOLUME: 128.6 ML
LEFT VENTRICLE CARDIAC INDEX: 2.6 L/MIN/M2
LEFT VENTRICLE CARDIAC OUTPUT: 5.5 L/MIN
LEFT VENTRICLE DIASTOLIC VOLUME INDEX: 58.6 CM3/M2 (ref 34–74)
LEFT VENTRICLE DIASTOLIC VOLUME: 123 CM3 (ref 62–150)
LEFT VENTRICLE HEART RATE: 61 BPM
LEFT VENTRICLE MASS INDEX: 145.5 G/M2
LEFT VENTRICLE SYSTOLIC VOLUME INDEX: 25.7 CM3/M2 (ref 11–31)
LEFT VENTRICLE SYSTOLIC VOLUME: 54 CM3 (ref 21–61)
LEFT VENTRICULAR INTERNAL DIMENSION IN DIASTOLE: 5.9 CM (ref 4.2–5.8)
LEFT VENTRICULAR INTERNAL DIMENSION IN SYSTOLE: 3.8 CM (ref 2.5–4)
LEFT VENTRICULAR MASS: 305.5 G
LEFT VENTRICULAR OUTFLOW TRACT MEAN GRADIENT: 2 MMHG
LEFT VENTRICULAR OUTFLOW TRACT MEAN VELOCITY: 57 CM/S
LEFT VENTRICULAR OUTFLOW TRACT PEAK GRADIENT: 4 MMHG
LEFT VENTRICULAR POSTERIOR WALL IN END DIASTOLE: 1.3 CM (ref 0.6–1)
LV STROKE VOLUME INDEX: 43.1 ML/M2
MITRAL REGURGITANT VELOCITY TIME INTEGRAL: 171 CM
MR MEAN GRADIENT: 73 MMHG
MR MEAN VELOCITY: 404 CM/S
MR PEAK GRADIENT: 113.6 MMHG
MR PISA RADIUS: 1 CM
MV DECELERATION TIME: 148 MS
MV PEAK E VELOCITY: 165 CM/S
NUC REST DIASTOLIC VOLUME INDEX: 3408 LBS
NUC REST SYSTOLIC VOLUME INDEX: 65 IN
PISA MR PEAK VEL: 533 CM/S
PR MAX PG: 6 MMHG
PR PEAK VELOCITY: 153 CM/S
PV ACCELERATION TIME: 85 MS
TRICUSPID REGURGITATION PEAK PRESSURE GRADIENT: 44.1 MMHG
TRICUSPID VALVE ANULAR PLANE SYSTOLIC EXCURSION: 0.9 CM
TRICUSPID VALVE PEAK REGURGITANT VELOCITY: 332 CM/S

## 2021-06-18 ASSESSMENT — MIFFLIN-ST. JEOR: SCORE: 1628.04

## 2021-06-18 NOTE — PROGRESS NOTES
In clinic device check with Device Nurse followed by office visit with Dr. Campos.  Please see link for full device report.  Patient was informed of results and next follow up during today's visit.

## 2021-06-18 NOTE — LETTER
Letter by Berenice Chisholm at      Author: Berenice Chisholm Service: -- Author Type: --    Filed:  Encounter Date: 2/26/2019 Status: (Other)       Sorin Weeks  [address]  [place] [address]      February 26, 2019      Dear Mr. Weeks,    RE: Remote Results    We are writing to you regarding your recent Remote Pacemaker check from home. Your transmission was received successfully. Battery status is satisfactory at this time.     Your results are within normal limits.    Your next device appointment will be a clinic visit.  Please call in April to schedule.      To schedule or reschedule, please call [phone] and press 1.    NOTE: If you would like to do an extra transmission, please call [phone] and press 3 to speak to a nurse BEFORE transmitting. This ensures that the Device Clinic staff is aware of the reason you are sending a transmission, and can follow-up with you after it has been reviewed.    We will be checking your implanted device from home (remotely) every three months unless otherwise instructed. We will need to see you in the clinic at least once a year. You may need to be seen in the clinic sooner depending on the results of your check.    Please be aware:    The follow-up schedule is like a Physician prescription.    Your remote monitor is paired to your specific implanted device.      Sincerely,    [facility]

## 2021-06-18 NOTE — PROGRESS NOTES
Per patient's wife, Joe, patient's mechanical valve information is as followed:    Make: [employer]  Model: [id number]-[id number]  Serial #: [id number]

## 2021-06-19 NOTE — LETTER
Letter by Berenice Chisholm at      Author: Berenice Chisholm Service: -- Author Type: --    Filed:  Encounter Date: 11/25/2019 Status: Signed         Sorin Weeks  [address] [id number]      November 25, 2019      Dear Mr. Weeks,    RE: Remote Results    We are writing to you regarding your recent Remote Pacemaker check from home. Your transmission was received successfully. Battery status is satisfactory at this time.     Your results are within normal limits.    Your next device appointment will be a remote check on February 25, 2020; this will occur automatically.    To schedule or reschedule, please call [phone] and press 1.    NOTE: If you would like to do an extra transmission, please call [phone] and press 3 to speak to a nurse BEFORE transmitting. This ensures that the Device Clinic staff is aware of the reason you are sending a transmission, and can follow-up with you after it has been reviewed.    We will be checking your implanted device from home (remotely) every three months unless otherwise instructed. We will need to see you in the clinic at least once a year. You may need to be seen in the clinic sooner depending on the results of your check.    Please be aware:    The follow-up schedule is like a Physician prescription.    Your remote monitor is paired to your specific implanted device.      Sincerely,    [facility]

## 2021-06-19 NOTE — LETTER
Letter by Miladis Cesar RDCS at      Author: Miladis Cesar RDCS Service: -- Author Type: --    Filed:  Encounter Date: 8/22/2019 Status: (Other)         Sorin Weeks  [address] [facility] [address]      August 22, 2019      Dear Mr. Weeks,    RE: Remote Results    We are writing to you regarding your recent Remote Pacemaker check from home. Your transmission was received successfully. Battery status is satisfactory at this time.     Your results are showing no significant changes.    Your next device appointment will be a remote check on November 25, 2019; this will occur automatically.    To schedule or reschedule, please call [phone] and press 1.    NOTE: If you would like to do an extra transmission, please call [phone] and press 3 to speak to a nurse BEFORE transmitting. This ensures that the Device Clinic staff is aware of the reason you are sending a transmission, and can follow-up with you after it has been reviewed.    We will be checking your implanted device from home (remotely) every three months unless otherwise instructed. We will need to see you in the clinic at least once a year. You may need to be seen in the clinic sooner depending on the results of your check.    Please be aware:    The follow-up schedule is like a Physician prescription.    Your remote monitor is paired to your specific implanted device.      Since[facility],    [facility]

## 2021-06-20 NOTE — LETTER
Letter by Griselda Bloom RN at      Author: Griselda Bloom RN Service: -- Author Type: --    Filed:  Encounter Date: 8/31/2020 Status: (Other)         Sorin Weeks  [address] [facility] [address]      September 2, 2020      Dear Mr. Weeks,    RE: Remote Results    We are writing to you regarding your recent Remote Pacemaker check from home. Your transmission was received successfully. Battery status is satisfactory at this time.     Your results are within normal limits.    Your next device appointment will be a remote check on 12/01/2020; this will occur automatically.    To schedule or reschedule, please call [phone] and press 1.    NOTE: If you would like to do an extra transmission, please call [phone] and press 3 to speak to a nurse BEFORE transmitting. This ensures that the Device Clinic staff is aware of the reason you are sending a transmission, and can follow-up with you after it has been reviewed.    We will be checking your implanted device from home (remotely) every three months unless otherwise instructed. We will need to see you in the clinic at least once a year. You may need to be seen in the clinic sooner depending on the results of your check.    Please be aware:    The follow-up schedule is like a Physician prescription.    Your remote monitor is paired to your specific implanted device.      Sincerely,    [facility]

## 2021-06-20 NOTE — LETTER
Letter by Angie Stewart RN at      Author: Angie Stewart RN Service: -- Author Type: --    Filed:  Encounter Date: 5/27/2020 Status: (Other)         Sorin Weeks  [address] [facility] [address]      May 27, 2020      Dear Mr. Weeks,    RE: Remote Results    We are writing to you regarding your recent Remote Pacemaker check from home. Your transmission was received successfully. Battery status is satisfactory at this time.     Your results are within normal limits.    Your next device appointment will be a remote check on 8/31/2020; this will occur automatically.    To schedule or reschedule, please call [phone] and press 1.    NOTE: If you would like to do an extra transmission, please call [phone] and press 3 to speak to a nurse BEFORE transmitting. This ensures that the Device Clinic staff is aware of the reason you are sending a transmission, and can follow-up with you after it has been reviewed.    We will be checking your implanted device from home (remotely) every three months unless otherwise instructed. We will need to see you in the clinic at least once a year. You may need to be seen in the clinic sooner depending on the results of your check.    Please be aware:    The follow-up schedule is like a Physician prescription.    Your remote monitor is paired to your specific implanted device.      Sincerely,    [facility] Device [facility]

## 2021-06-21 NOTE — LETTER
Letter by Miladis Cesar RDCS at      Author: Miladis Cesar RDCS Service: -- Author Type: --    Filed:  Encounter Date: 3/2/2021 Status: (Other)         Sorin Weeks  [address] [facility] [address]      March 2, 2021      Dear Mr. Weeks,    RE: Remote Results    We are writing to you regarding your recent Remote Pacemaker check from home. Your transmission was received successfully. Battery status is satisfactory at this time.     Your results are showing no significant changes.    Your next device appointment will be a remote check on June 2, 2021; this will occur automatically.    To schedule or reschedule, please call [phone] and press 1.    NOTE: If you would like to do an extra transmission, please call [phone] and press 3 to speak to a nurse BEFORE transmitting. This ensures that the Device Clinic staff is aware of the reason you are sending a transmission, and can follow-up with you after it has been reviewed.    We will be checking your implanted device from home (remotely) every three months unless otherwise instructed. We will need to see you in the clinic at least once a year. You may need to be seen in the clinic sooner depending on the results of your check.    Please be aware:    The follow-up schedule is like a Physician prescription.    Your remote monitor is paired to your specific implanted device.      Sincerely,    [facility]

## 2021-06-21 NOTE — LETTER
Letter by Berenice Chisholm at      Author: Berenice Chisholm Service: -- Author Type: --    Filed:  Encounter Date: 12/1/2020 Status: (Other)         Sorin Weeks  [address] [id number]      December 1, 2020      Dear Mr. Weeks,    RE: Remote Results    We are writing to you regarding your recent Remote Pacemaker check from home. Your transmission was received successfully. Battery status is satisfactory at this time.     Your results are within normal limits.    Your next device appointment will be a remote check on March 2, 2021; this will occur automatically.    To schedule or reschedule, please call [phone] and press 1.    NOTE: If you would like to do an extra transmission, please call [phone] and press 3 to speak to a nurse BEFORE transmitting. This ensures that the Device Clinic staff is aware of the reason you are sending a transmission, and can follow-up with you after it has been reviewed.    We will be checking your implanted device from home (remotely) every three months unless otherwise instructed. We will need to see you in the clinic at least once a year. You may need to be seen in the clinic sooner depending on the results of your check.    Please be aware:    The follow-up schedule is like a Physician prescription.    Your remote monitor is paired to your specific implanted device.      Sincerely,    [facility] Device [facility]

## 2021-06-21 NOTE — LETTER
Letter by Jhony Campos MD at      Author: Jhony Campos MD Service: -- Author Type: --    Filed:  Encounter Date: 5/13/2021 Status: (Other)         Sorin Weeks  [address] [facility] [address]      May 13, 2021      Dear Sorin,    This letter is to remind you that you are due for your follow up appointment with Dr. Jhony Campos . To help ensure you are in the best health possible, a regular follow-up with your cardiologist is essential.     Please call our Patient Scheduling Line at [phone] to schedule your appointment at your earliest convenience.  If you have recently scheduled an appointment, please disregard this letter.    We look forward to seeing you again. As always, we are available at the number  above for any questions or concerns you may have.      Sincerely,     The Phys[facility] and Staff of [facility]

## 2021-06-21 NOTE — LETTER
Letter by Jhony Campos MD at      Author: Jhony Campos MD Service: -- Author Type: --    Filed:  Encounter Date: 4/28/2021 Status: (Other)         Sorin Weeks  [address] [facility] [address]      April 28, 2021      Dear Sorin,    This letter is to remind you that you are due for your follow up appointment with Dr. Jhony Campos with DEVICE check  . To help ensure you are in the best health possible, a regular follow-up with your cardiologist is essential.     Please call our Patient Scheduling Line at [phone] to schedule your appointment at your earliest convenience.  If you have recently scheduled an appointment, please disregard this letter.    We look forward to seeing you again. As always, we are available at the number  above for any questions or concerns you may have.      Sincerely,     The Physicians and Staff of [facility]

## 2021-06-24 ENCOUNTER — AMBULATORY - HEALTHEAST (OUTPATIENT)
Dept: CARDIOLOGY | Facility: CLINIC | Age: 76
End: 2021-06-24

## 2021-06-24 DIAGNOSIS — Z95.2 S/P AVR (AORTIC VALVE REPLACEMENT): ICD-10-CM

## 2021-06-25 NOTE — PROGRESS NOTES
Progress Notes by Jhony Campos MD at 5/4/2017  2:30 PM     Author: Jhony Campos MD Service: -- Author Type: Physician    Filed: 5/4/2017  2:50 PM Encounter Date: 5/4/2017 Status: Signed    : Jhony Campos MD (Physician)           Click to link to [facility] HEART [facility] NOTE    Thank you, Dr. Lynn, for asking us to see Sorin Weeks at the [facility].      Assessment/Recommendations   Patient with known aortic valve disease and a paravalvular leak.  He has been reluctant to fix this and in general is just reluctant to going to the hospital for any procedures.  He is agreeable to check a transthoracic echocardiogram at this time and he likely would need a transesophageal echocardiogram if we were considering plugging the paravalvular leak.  We will start with a transthoracic echocardiogram to see if there is regional wall motion normality as well as check his left ventricular systolic function and the paravalvular leak.    We will get back to him with the results and any further recommendations.         History of Present Illness    Mr. Sorin Weeks is a 71 y.o. male with known aortic valve disease who had a mechanical valve placed in the aortic position.  He does have a significant paravalvular leak.  When I saw him last year he had been admitted briefly to [facility] where they felt there was quite significant aortic insufficiency.  He also had a question of new regional wall motion normality on his echocardiogram while there as well.  At that point in time I recommended a CT angiogram as well as a referral to plug the aortic insufficiency jet which is a paravalvular leak.  He thought about it for a while just was not interested in.  He would consider it now for it was urgent.    He reports that he has had more trouble with his breathing this spring and he thinks it is related to allergies.  He does not have a history of allergies  however.  He feels short of breath and a little bit wheezy at times.    He does have permanent atrial fibrillation and his device check was unremarkable today.  The rate responsive mode was changed so that it would respond more quickly as he has somewhat blunted rate response.       Physical Examination Review of Systems   Vitals:    05/04/17 1358   BP: 156/58   Pulse: 60   Resp: 20     Body mass index is 39.94 kg/(m^2).  Wt Readings from Last 3 Encounters:   05/04/17 (!) 240 lb (108.9 kg)   08/18/16 (!) 238 lb (108 kg)   05/05/16 (!) 240 lb (108.9 kg)     General Appearance:   Alert, cooperative and in no acute distress.   ENT/Mouth: Oral mucuos membranes pink and moist .      EYES:  No scleral icterus. No Xanthelasma.    Neck: JVP 10 cm.  Positive hepatojugular reflux. Thyroid not visualizedly   Chest/Lungs:   Lungs are clear to auscultation slight diminished breath sounds at the bases, equal chest wall expansion    Cardiovascular:   S1, metallic S2 with 3/6 diastolic murmur , no clicks or rubs. Brachial, radial pulses are intact and symetric. No carotid bruits noted   Abdomen:  Nontender. BS+. No bruits.      Extremities: No cyanosis, clubbing and bilateral pretibial edema   Skin: no xanthelasma, warm.    Psych: Appropriate affect.   Neurologic: normal gait, normal  bilateral, no tremors        General: WNL  Eyes: WNL  Ears/Nose/Throat: WNL  Lungs: WNL  Heart: WNL  Stomach: WNL  Bladder: WNL  Muscle/Joints: WNL  Skin: WNL  Nervous System: WNL  Mental Health: WNL     Blood: WNL     Medical History  Surgical History Family History Social History   Past Medical History:   Diagnosis Date   ? Diabetes mellitus    ? Hypertension     Past Surgical History:   Procedure Laterality Date   ? CARDIAC CATHETERIZATION     ? INSERT / REPLACE / REMOVE PACEMAKER     ? MI RPLCMT PROST AORTIC VALVE OPEN XCP HOMOGRF/STENT      Description: Aortic Valve Replacement;  Recorded: 06/05/2013;    No family history on file. Social  "History     Social History   ? Marital status:      Spouse name: N/A   ? Number of children: N/A   ? Years of education: N/A     Occupational History   ? Not on file.     Social History Main Topics   ? Smoking status: Former Smoker     Types: Cigarettes   ? Smokeless tobacco: Not on file   ? Alcohol use Not on file   ? Drug use: Not on file   ? Sexual activity: Not on file     Other Topics Concern   ? Not on file     Social History Narrative          Medications  Allergies   Current Outpatient Prescriptions   Medication Sig Dispense Refill   ? finasteride (PROSCAR) 5 mg tablet 5 mg. Take one tablet by mouth daily.  5   ? FOLIC ACID/MULTIVIT-MIN/LUTEIN (CENTRUM SILVER ORAL) Take by mouth daily.     ? furosemide (LASIX) 20 MG tablet Take 20 mg by mouth daily.      ? glipiZIDE (GLUCOTROL) 10 MG 24 hr tablet Take 10 mg by mouth daily.     ? JANUVIA 100 mg tablet Take 1 tablet by mouth daily.  0   ? lisinopril-hydrochlorothiazide (PRINZIDE,ZESTORETIC) 20-12.5 mg per tablet Take 2 tablets by mouth daily.      ? metoprolol succinate (TOPROL-XL) 100 MG 24 hr tablet Take 100 mg by mouth daily.     ? tamsulosin (FLOMAX) 0.4 mg Cp24 0.4 mg Daily after breakfast.   5   ? warfarin (COUMADIN) 5 MG tablet      ? warfarin (COUMADIN) 7.5 MG tablet As directed  3     No current facility-administered medications for this visit.       Allergies   Allergen Reactions   ? Atenolol    ? Buprenorphine Hcl Other (See Comments)     pt. felt \"loopy\"   ? Codeine    ? Penicillins    ? Sulfa (Sulfonamide Antibiotics)    ? Vancomycin          Lab Results    Chemistry/lipid CBC Cardiac Enzymes/BNP/TSH/INR   Lab Results   Component Value Date    CREATININE 1.06 06/07/2013    BUN 28 (H) 06/07/2013    K 3.6 06/07/2013     06/07/2013     06/07/2013    CO2 24 06/07/2013    Lab Results   Component Value Date    WBC 6.9 11/24/2015    HGB 14.5 11/24/2015    HCT 41.1 11/24/2015    MCV 92 11/24/2015     11/24/2015    Lab Results "   Component Value Date     (H) 11/24/2015    INR 2.56 (H) 06/07/2013

## 2021-06-26 NOTE — PROGRESS NOTES
Progress Notes by Jhony Campos MD at 5/17/2018  4:30 PM     Author: Jhony Campos MD Service: -- Author Type: Physician    Filed: 5/18/2018  6:26 AM Encounter Date: 5/17/2018 Status: Signed    : Jhony Campos MD (Physician)           Click to link to [facility] HEART [facility] NOTE    Thank you, Dr. Lynn, for asking us to see Sorin Weeks at the [facility].      Assessment/Recommendations   Patient with known valvular heart disease and paravalvular leak around the aortic valve.  He is stable from a functional capacity standpoint at this time but I am concerned about the paravalvular leak and the possibility of ventricular dilation or even reduced left ventricular systolic function without new symptoms.    I would like to repeat an echocardiogram to evaluate his LV size and systolic function and the degree of aortic insufficiency.  It is been a couple years since he has had his hemoglobin checked, and I would also be concerned about the possibility of hemolysis with the paravalvular leaks we will check a CBC today.    Thank you for allowing us to participate in his care.         History of Present Illness    Mr. Sorin Weeks is a 72 y.o. male with known valvular heart disease and atrial fibrillation.  He had a mechanical valve placed in the aortic position and has had a significant paravalvular leak.  He is not developed symptoms of congestive heart failure in the past and is been reticent to the idea of plugging the area associated with the paravalvular leak.  An echocardiogram last year showed normal left ventricular systolic function, moderate aortic insufficiency, no dilation of the left ventricle.  The mechanical valve is working without problems.    He has been feeling well.  He has chronic peripheral edema which is better when he wears support stockings.  He works as an  so does a lot of sitting.  He has not had orthopnea,  paroxysmal nocturnal dyspnea, syncope or near syncopal episodes and denies any chest discomfort.  Overall he states that he feels quite stable.  He is planning on retiring next year.             Physical Examination Review of Systems   Vitals:    05/17/18 1551   BP: 122/66   Pulse: 60   Resp: 18     Body mass index is 38.44 kg/(m^2).  Wt Readings from Last 3 Encounters:   05/17/18 (!) 231 lb (104.8 kg)   07/28/17 (!) 230 lb (104.3 kg)   05/12/17 (!) 240 lb (108.9 kg)     General Appearance:   Alert, cooperative and in no acute distress.   ENT/Mouth: Oral mucuos membranes pink and moist .      EYES:  No scleral icterus. No Xanthelasma.    Neck: JVP normal. No Hepatojugular reflux. Thyroid not visualized   Chest/Lungs:   Lungs are clear to auscultation, equal chest wall expansion    Cardiovascular:   S1, metallic S2 with 2/6 diastolic murmur , no clicks or rubs. Brachial, radial and pulses are intact and symetric. No carotid bruits noted   Abdomen:  Nontender. BS+.  Rounded      Extremities: No cyanosis, clubbing and bilateral pretibial pitting edema which is worse on the left   Skin: no xanthelasma, warm.    Psych: Appropriate affect.   Neurologic: normal gait, normal  bilateral, no tremors        General: WNL  Eyes: WNL  Ears/Nose/Throat: WNL  Lungs: WNL  Heart: WNL  Stomach: WNL  Bladder: WNL  Muscle/Joints: WNL  Skin: WNL  Nervous System: WNL  Mental Health: WNL     Blood: WNL     Medical History  Surgical History Family History Social History   Past Medical History:   Diagnosis Date   ? Diabetes mellitus    ? Hypertension     Past Surgical History:   Procedure Laterality Date   ? CARDIAC CATHETERIZATION     ? INSERT / REPLACE / REMOVE PACEMAKER     ? ME RPLCMT PROST AORTIC VALVE OPEN XCP HOMOGRF/STENT      Description: Aortic Valve Replacement;  Recorded: 06/05/2013;    No family history on file. Social History     Social History   ? Marital status:      Spouse name: N/A   ? Number of children: N/A   ?  "Years of education: N/A     Occupational History   ? Not on file.     Social History Main Topics   ? Smoking status: Former Smoker     Types: Cigarettes   ? Smokeless tobacco: Never Used   ? Alcohol use Not on file   ? Drug use: Not on file   ? Sexual activity: Not on file     Other Topics Concern   ? Not on file     Social History Narrative          Medications  Allergies   Current Outpatient Prescriptions   Medication Sig Dispense Refill   ? finasteride (PROSCAR) 5 mg tablet 5 mg. Take one tablet by mouth daily.  5   ? FOLIC ACID/MULTIVIT-MIN/LUTEIN (CENTRUM SILVER ORAL) Take by mouth daily.     ? furosemide (LASIX) 20 MG tablet Take 20 mg by mouth daily.      ? glipiZIDE (GLUCOTROL) 10 MG 24 hr tablet Take 10 mg by mouth daily.     ? JANUVIA 100 mg tablet Take 1 tablet by mouth daily.  0   ? liraglutide (VICTOZA SUBQ) Inject 1.2 mg under the skin.     ? lisinopril-hydrochlorothiazide (PRINZIDE,ZESTORETIC) 20-12.5 mg per tablet Take 2 tablets by mouth daily.      ? metoprolol succinate (TOPROL-XL) 100 MG 24 hr tablet Take 100 mg by mouth daily.     ? tamsulosin (FLOMAX) 0.4 mg Cp24 0.4 mg Daily after breakfast.   5   ? warfarin (COUMADIN) 5 MG tablet      ? warfarin (COUMADIN) 7.5 MG tablet As directed  3     No current facility-administered medications for this visit.       Allergies   Allergen Reactions   ? Atenolol    ? Buprenorphine Hcl Other (See Comments)     pt. felt \"loopy\"   ? Codeine    ? Penicillins    ? Sulfa (Sulfonamide Antibiotics)    ? Vancomycin          Lab Results    Chemistry/lipid CBC Cardiac Enzymes/BNP/TSH/INR   Lab Results   Component Value Date    CREATININE 1.06 06/07/2013    BUN 28 (H) 06/07/2013    K 3.6 06/07/2013     06/07/2013     06/07/2013    CO2 24 06/07/2013    Lab Results   Component Value Date    WBC 6.9 11/24/2015    HGB 14.5 11/24/2015    HCT 41.1 11/24/2015    MCV 92 11/24/2015     11/24/2015    Lab Results   Component Value Date     (H) 11/24/2015 "    INR 2.56 (H) 06/07/2013

## 2021-06-26 NOTE — PROGRESS NOTES
In clinic device check with Dr. Campos.  Please see link for full device report.  Patient was informed of results and next follow up during today's visit.

## 2021-06-28 ENCOUNTER — AMBULATORY - HEALTHEAST (OUTPATIENT)
Dept: CARDIOLOGY | Facility: HOSPITAL | Age: 76
End: 2021-06-28

## 2021-06-28 DIAGNOSIS — Z11.59 ENCOUNTER FOR SCREENING FOR OTHER VIRAL DISEASES: ICD-10-CM

## 2021-06-28 NOTE — PROGRESS NOTES
Progress Notes by Jhony Campos MD at 11/27/2019  2:10 PM     Author: Jhony Campos MD Service: -- Author Type: Physician    Filed: 11/27/2019  2:48 PM Encounter Date: 11/27/2019 Status: Signed    : Jhony Campos MD (Physician)               Assessment/Recommendations   Patient with known mechanical prosthesis in the aortic position which is placed in 1995 by Dr. Arlen Holter.  He has a paravalvular leak which is been there for some time and some borderline enlargement of the left ventricle in the past.  We have discussed plugging the paravalvular leak in the past and he has had no interest but would consider it now and we will repeat an echocardiogram.    We will also check to see what is histograms look like on his pacemaker to see if there is any opportunity to change his rate responsive mode.    He is asked if we could send a prescription in for compression stockings as they been very helpful for him.    We may well need to image his a sending aorta once again.  He did have a bicuspid aortic valve in the past, prior to aortic valve replacement.    Thank you for allowing us to participate in his care.       History of Present Illness/Subjective    Mr. Sorin Weeks is a 73 y.o. male with aortic valve disease with aortic valve replacement with mechanical [place] prosthesis in 1995.  This is placed by Dr. Arlen Holter at [facility].  He has had a paravalvular leak which we have followed for some time.  More recent echocardiograms is suggested borderline to mildly enlarged left ventricle but normal left ventricular systolic function.  We have talked about plugging the paravalvular leak but he is been reticent against that.  He also has sleep apnea but his snoring is improved he does not tolerate the sleep apnea device and so is not being treated for that.  He feels like his energy is poor but he denies breathing problems.  He did have some worsening shortness of breath and  fairly dramatic peripheral edema and he went on a double dose of Lasix and lost 30 pounds with improvement in his peripheral edema, improvement in his breathing, improvement in his functional capacity.  Recent renal function was normal.    He denies orthopnea or paroxysmal nocturnal dyspnea and peripheral edema is better.  He has not had syncopal or near syncopal episodes.  He can feel his heart flip flopping at times but this is not a dramatic finding.           Physical Examination Review of Systems   Vitals:    11/27/19 1401   BP: 132/60   Pulse: 72   Resp: 16     Body mass index is 33.12 kg/m .  Wt Readings from Last 3 Encounters:   11/27/19 199 lb (90.3 kg)   05/29/19 213 lb (96.6 kg)   06/14/18 (!) 231 lb (104.8 kg)     General Appearance:   Alert, cooperative and in no acute distress.   ENT/Mouth: Oral mucuos membranes pink and moist .      EYES:  no scleral icterus, normal conjunctivae   Neck: JVP normal. No Hepatojugular reflux. Thyroid not visualized.   Chest/Lungs:   Lungs are clear to auscultation, equal chest wall expansion.   Cardiovascular:   S1, metallic S2 with 2/6 systolic and 2/6 diastolic murmurs ,clicks or rubs. Brachial, radial and posterior tibial pulses are intact and symetric. No carotid bruits noted   Abdomen:  Nontender. BS+. No bruits.   Extremities: No cyanosis, clubbing and minimal pretibial edema   Skin: no xanthelasma, warm.    Neurologic: normal  bilateral, no tremors     Psychiatric: Appropriate affect.      General: WNL  Eyes: WNL  Ears/Nose/Throat: WNL  Lungs: WNL  Heart: WNL  Stomach: WNL  Bladder: WNL  Muscle/Joints: WNL  Skin: WNL  Nervous System: WNL  Mental Health: WNL     Blood: WNL       Medical History  Surgical History Family History Social History   Past Medical History:   Diagnosis Date   ? Diabetes mellitus (H)    ? Hypertension     Past Surgical History:   Procedure Laterality Date   ? CARDIAC CATHETERIZATION     ? INSERT / REPLACE / REMOVE PACEMAKER     ? [place]  RPLCMT PROST AORTIC VALVE OPEN XCP HOMOGRF/STENT      Description: Aortic Valve Replacement;  Recorded: 06/05/2013;    No family history on file. Social History     Socioeconomic History   ? Marital status:      Spouse name: Not on file   ? Number of children: Not on file   ? Years of education: Not on file   ? Highest education level: Not on file   Occupational History   ? Not on file   Social Needs   ? Financial resource strain: Not on file   ? Food insecurity:     Worry: Not on file     Inability: Not on file   ? Transportation needs:     Medical: Not on file     Non-medical: Not on file   Tobacco Use   ? Smoking status: Former Smoker     Types: Cigarettes   ? Smokeless tobacco: Never Used   Substance and Sexual Activity   ? Alcohol use: Not on file   ? Drug use: Not on file   ? Sexual activity: Not on file   Lifestyle   ? Physical activity:     Days per week: Not on file     Minutes per session: Not on file   ? Stress: Not on file   Relationships   ? Social connections:     Talks on phone: Not on file     Gets together: Not on file     Attends Caodaism service: Not on file     Active member of club or organization: Not on file     Attends meetings of clubs or organizations: Not on file     Relationship status: Not on file   ? Intimate partner violence:     Fear of current or ex partner: Not on file     Emotionally abused: Not on file     Physically abused: Not on file     Forced sexual activity: Not on file   Other Topics Concern   ? Not on file   Social History Narrative   ? Not on file          Medications  Allergies   Current Outpatient Medications   Medication Sig Dispense Refill   ? CHEWABLE MULTI VITAMIN ORAL Take 2 tablets by mouth daily.     ? finasteride (PROSCAR) 5 mg tablet 5 mg. Take one tablet by mouth daily.  5   ? furosemide (LASIX) 20 MG tablet Take 20 mg by mouth 2 (two) times a day.      ? glipiZIDE (GLUCOTROL) 10 MG 24 hr tablet Take 10 mg by mouth daily.     ? liraglutide (VICTOZA  "SUBQ) Inject 1.2 mg under the skin daily.      ? metoprolol succinate (TOPROL-XL) 100 MG 24 hr tablet Take 100 mg by mouth daily.     ? tamsulosin (FLOMAX) 0.4 mg Cp24 0.4 mg Daily after breakfast.   5   ? warfarin (COUMADIN) 5 MG tablet Take as directed     ? warfarin (COUMADIN) 7.5 MG tablet As directed  3   ? FOLIC ACID/MULTIVIT-MIN/LUTEIN (CENTRUM SILVER ORAL) Take by mouth daily.     ? JANUVIA 100 mg tablet Take 1 tablet by mouth daily.  0     No current facility-administered medications for this visit.     Allergies   Allergen Reactions   ? Atenolol    ? Buprenorphine Hcl Other (See Comments)     pt. felt \"loopy\"   ? Codeine    ? Penicillins    ? Sulfa (Sulfonamide Antibiotics)    ? Vancomycin          Lab Results    Chemistry/lipid CBC Cardiac Enzymes/BNP/TSH/INR   Lab Results   Component Value Date    CREATININE 1.06 06/07/2013    BUN 28 (H) 06/07/2013    K 3.6 06/07/2013     06/07/2013     06/07/2013    CO2 24 06/07/2013    Lab Results   Component Value Date    WBC 7.3 05/17/2018    HGB 14.0 05/17/2018    HCT 41.2 05/17/2018    MCV 93 05/17/2018     05/17/2018    Lab Results   Component Value Date     (H) 11/24/2015    INR 2.56 (H) 06/07/2013                                                "

## 2021-06-28 NOTE — PROGRESS NOTES
Progress Notes by Griselda Bloom RN at 11/29/2019  5:04 PM     Author: Griselda Bloom RN Service: -- Author Type: Registered Nurse    Filed: 11/29/2019  5:05 PM Encounter Date: 11/29/2019 Status: Signed    : Griselda Bloom RN (Registered Nurse)            Sorin Weeks  Male, 73 y.o., 1945  Weight:   199 lb (90.3 kg)  Phone:   [phone]  PCP:   Florina Spangler MD  MRN:   [id number]  MyChart:   Inactive  Next Appt:   12/17/2019  Pref Name:   None  Pref Language:   English  Need :   None  Encounter Type:   None  My Pat List Reminders:   None  Message   Received: Today   Message Contents   Jhony Campos MD Wiatros, Suzanne M, RN; Connie Peterson             [place] a try.   Yolie, could you set him up for device clinic.     Thanks,     Jhony    Previous Messages      ----- Message -----   From: Griselda Bloom RN   Sent: 11/29/2019  10:26 AM CST   To: MD Ivy Reeder,     Apologizes for the delayed response, I was out of the office on 11/27 when you sent this through.     Mr. Vallejos activities do not appear to have changed much over the course of the past three years-worth of data even with Rate Response reprogramming.  However, if he has had any changes affecting his ambulation or gait, we would certainly have him come into the Device Clinic for a re-evaluation of his Rate Response settings and reprogram setting(s) if needed.     Griselda Durbin   ----- Message -----   From: Jhony Campos MD   Sent: 11/27/2019   2:49 PM CST   To: NIRALI Cano,    Could you look at this patient's device checks and see if his rate responsiveness is set appropriately.  He is having difficulty with energy level.    Thanks,    Jhony Frank.  Routed to  per Dr. JESUISTA Campos

## 2021-07-04 NOTE — PATIENT INSTRUCTIONS - HE
Patient Instructions by Jhony Campos MD at 6/8/2021  3:10 PM     Author: Jhony Campos MD Service: -- Author Type: Physician    Filed: 6/8/2021  3:37 PM Encounter Date: 6/8/2021 Status: Signed    : Jhony Campos MD (Physician)                     Sorin Weeks,    It was a pleasure to see you today at the [facility].     My recommendations after this visit include:    1.  We will increase your furosemide dose to 40 mg in the morning and 20 mg in the evening.  Tonight you can take an extra 20 mg as well for a total of 40 mg tonight.    2.  We will be checking blood work today and calling you with the results.    3.  We will get an echocardiogram in the near future to evaluate your valve, the pumping function of your heart and likely will need a visit with our valve group.        Jhony Campos

## 2021-07-04 NOTE — PROGRESS NOTES
Progress Notes by Jhony Campos MD at 6/8/2021  3:10 PM     Author: Jhony Campos MD Service: -- Author Type: Physician    Filed: 6/8/2021  3:42 PM Encounter Date: 6/8/2021 Status: Signed    : Jhony Campos MD (Physician)               Assessment/Recommendations   Patient with known aortic valve disease, permanent atrial fibrillation, permanent permanent pacemaker implantation, with mechanical prosthesis in the aortic position with significant paravalvular leak in the past.  We have talked him about the possibility of plugging the paravalvular leak and he has had some mild reduction left ventricular systolic function but because he was relatively asymptomatic he declined.  He is now short of breath with peripheral edema and has evidence of congestive heart failure.  It has been since sometime since his follow-up in part related to the pandemic.    I have asked him to double his furosemide in the morning to 40 mg a day and continue the 20 mg in the evening.  We will check a basic metabolic panel, a B natruretic peptide, and a CBC today and call him with the results and further recommendations.    We will get an echocardiogram in the near future and likely discussed and refer him to our valve group to see if there is an opportunity to do a nonsurgical fix of the perivalvular leak, if that is in fact the issue.  He may also require transesophageal echocardiogram.   May need to consider biventricular pacing as he is mostly RV paced.    You for allowing us to participate in his care.       History of Present Illness/Subjective    Mr. Sorin Weeks is a 75 y.o. male with known atrial fibrillation, mechanical prosthesis in the aortic position, enlargement of the ascending aorta, permanent pacemaker, and paravalvular leak around the mechanical prosthesis in the aortic position.  He has been feeling poorly over the last 4 to 6 weeks.  He has developed more orthopnea, peripheral edema, and shortness  of breath with activity.  Walking his dog just on the block was relatively easy in the past but he is struggling to do that with his breathing now.  He has fairly dramatic peripheral edema which is new over the last 4 to 6 weeks.  He has not had any chest discomfort or syncopal episodes.  He does awaken at night short of breath and will go up and sit in the recliner and this is completely consistent with paroxysmal nocturnal dyspnea.    Device check today showed normally functioning device.  Battery has 7 years plus left.       Physical Examination Review of Systems   Vitals:    06/08/21 1422   BP: 122/70   Pulse: 60   Resp: 16   SpO2: 97%     Body mass index is 35.45 kg/m .  Wt Readings from Last 3 Encounters:   06/08/21 213 lb (96.6 kg)   12/17/19 200 lb (90.7 kg)   12/11/19 200 lb (90.7 kg)     General Appearance:   Alert, cooperative and in no acute distress.   ENT/Mouth: Patient wearing a mask.      EYES:  no scleral icterus, normal conjunctivae   Neck: JVP 12 cm.  Positive hepatojugular reflux. Thyroid not visualized.   Chest/Lungs:   Lungs are clear to auscultation, equal chest wall expansion.   Cardiovascular:   S1, S2 without murmur ,clicks or rubs. Brachial, radial  pulses are intact and symetric. No carotid bruits noted   Abdomen:  Nontender. BS+.    Extremities: No cyanosis, clubbing and fairly significant bilateral pretibial pitting edema   Skin: no xanthelasma, warm.    Neurologic: normal arm movement bilateral, no tremors     Psychiatric: Appropriate affect.      General: WNL  Eyes: WNL  Ears/Nose/Throat: Hearing Loss  Lungs: Shortness of Breath, Snoring  Heart: Shortness of Breath with activity, Leg Swelling  Stomach: WNL  Bladder: Frequent Urination at Night  Muscle/Joints: Joint Pain, Muscle Weakness(Falls)  Skin: WNL  Nervous System: Daytime Sleepiness  Mental Health: WNL     Blood: Easy Bleeding       Medical History  Surgical History Family History Social History   Past Medical History:    Diagnosis Date   ? Diabetes mellitus (H)    ? Hypertension     Past Surgical History:   Procedure Laterality Date   ? CARDIAC CATHETERIZATION     ? INSERT / REPLACE / REMOVE PACEMAKER     ? [place] RPLCMT PROST AORTIC VALVE OPEN XCP HOMOGRF/STENT      Description: Aortic Valve Replacement;  Recorded: 06/05/2013;    No family history on file. Social History     Socioeconomic History   ? Marital status:      Spouse name: Not on file   ? Number of children: Not on file   ? Years of education: Not on file   ? Highest education level: Not on file   Occupational History   ? Not on file   Social Needs   ? Financial resource strain: Not on file   ? Food insecurity     Worry: Not on file     Inability: Not on file   ? Transportation needs     Medical: Not on file     Non-medical: Not on file   Tobacco Use   ? Smoking status: Former Smoker     Types: Cigarettes   ? Smokeless tobacco: Never Used   Substance and Sexual Activity   ? Alcohol use: Not on file   ? Drug use: Not on file   ? Sexual activity: Not on file   Lifestyle   ? Physical activity     Days per week: Not on file     Minutes per session: Not on file   ? Stress: Not on file   Relationships   ? Social connections     Talks on phone: Not on file     Gets together: Not on file     Attends Islam service: Not on file     Active member of club or organization: Not on file     Attends meetings of clubs or organizations: Not on file     Relationship status: Not on file   ? Intimate partner violence     Fear of current or ex partner: Not on file     Emotionally abused: Not on file     Physically abused: Not on file     Forced sexual activity: Not on file   Other Topics Concern   ? Not on file   Social History Narrative   ? Not on file          Medications  Allergies   Current Outpatient Medications   Medication Sig Dispense Refill   ? finasteride (PROSCAR) 5 mg tablet 5 mg. Take one tablet by mouth daily.  5   ? furosemide (LASIX) 20 MG tablet Take 20 mg by mouth 2  "(two) times a day.      ? glipiZIDE (GLUCOTROL) 10 MG 24 hr tablet Take 10 mg by mouth daily.     ? lisinopril-hydrochlorothiazide (PRINZIDE,ZESTORETIC) 20-12.5 mg per tablet Take 1 tablet by mouth daily.  0   ? metFORMIN (GLUCOPHAGE) 500 MG tablet Take 500 mg by mouth 2 (two) times a day.     ? metoprolol succinate (TOPROL-XL) 100 MG 24 hr tablet Take 100 mg by mouth daily.     ? multivit,calc,mins/folic acid (ONE-A-DAY PROACTIVE 65 PLUS ORAL) Take 1 tablet by mouth daily.     ? tamsulosin (FLOMAX) 0.4 mg Cp24 0.4 mg Daily after breakfast.   5   ? warfarin (COUMADIN) 5 MG tablet Take 5mg daiy except on Tuesdays take 1/2 tab (2.5mg) - Take as directed     ? CHEWABLE MULTI VITAMIN ORAL Take 2 tablets by mouth daily.     ? furosemide (LASIX) 20 MG tablet Take 1 tablet (20 mg total) by mouth daily. Take an extra 20 mg tablet of furosemide each morning with your current morning dose of 20 mg that is prepackaged.  Your total dose in the morning will be 40 mg and your total dose in the evening is 20 mg. 50 tablet 3   ? liraglutide (VICTOZA SUBQ) Inject 0.6 mg under the skin daily.        No current facility-administered medications for this visit.     Allergies   Allergen Reactions   ? Atenolol    ? Buprenorphine Hcl Other (See Comments)     pt. felt \"loopy\"   ? Codeine    ? Penicillins    ? Sulfa (Sulfonamide Antibiotics)    ? Vancomycin          Lab Results    Chemistry/lipid CBC Cardiac Enzymes/BNP/TSH/INR   Lab Results   Component Value Date    CREATININE 1.06 06/07/2013    BUN 28 (H) 06/07/2013    K 3.6 06/07/2013     06/07/2013     06/07/2013    CO2 24 06/07/2013    Lab Results   Component Value Date    WBC 7.3 05/17/2018    HGB 14.0 05/17/2018    HCT 41.2 05/17/2018    MCV 93 05/17/2018     05/17/2018    Lab Results   Component Value Date     (H) 11/24/2015    INR 2.56 (H) 06/07/2013                                                "

## 2021-07-05 ENCOUNTER — AMBULATORY - HEALTHEAST (OUTPATIENT)
Dept: LAB | Facility: CLINIC | Age: 76
End: 2021-07-05

## 2021-07-05 DIAGNOSIS — Z11.59 ENCOUNTER FOR SCREENING FOR OTHER VIRAL DISEASES: ICD-10-CM

## 2021-07-05 LAB
SARS-COV-2 PCR COMMENT: NORMAL
SARS-COV-2 RNA SPEC QL NAA+PROBE: NEGATIVE
SARS-COV-2 VIRUS SPECIMEN SOURCE: NORMAL

## 2021-07-06 ENCOUNTER — COMMUNICATION - HEALTHEAST (OUTPATIENT)
Dept: SCHEDULING | Facility: CLINIC | Age: 76
End: 2021-07-06

## 2021-07-06 VITALS — BODY MASS INDEX: 35.49 KG/M2 | HEIGHT: 65 IN | WEIGHT: 213 LBS

## 2021-07-06 VITALS
DIASTOLIC BLOOD PRESSURE: 70 MMHG | WEIGHT: 213 LBS | HEART RATE: 60 BPM | BODY MASS INDEX: 35.45 KG/M2 | RESPIRATION RATE: 16 BRPM | OXYGEN SATURATION: 97 % | SYSTOLIC BLOOD PRESSURE: 122 MMHG

## 2021-07-07 ENCOUNTER — HOSPITAL ENCOUNTER (OUTPATIENT)
Dept: CARDIOLOGY | Facility: HOSPITAL | Age: 76
Discharge: HOME | End: 2021-07-07
Attending: INTERNAL MEDICINE | Admitting: INTERNAL MEDICINE
Payer: MEDICARE

## 2021-07-07 DIAGNOSIS — Z95.2 S/P AVR (AORTIC VALVE REPLACEMENT): ICD-10-CM

## 2021-07-07 LAB
BSA FOR ECHO PROCEDURE: 2.03 M2
CV BLOOD PRESSURE: NORMAL MMHG
ECHO EJECTION FRACTION ESTIMATED: 55 %
GLUCOSE BLDC GLUCOMTR-MCNC: 139 MG/DL (ref 70–139)
INR PPP: 2.14 (ref 0.9–1.1)
LEFT VENTRICLE HEART RATE: 61 BPM

## 2021-07-14 ENCOUNTER — OFFICE VISIT (OUTPATIENT)
Dept: CARDIOLOGY | Facility: CLINIC | Age: 76
End: 2021-07-14
Payer: MEDICARE

## 2021-07-14 VITALS
BODY MASS INDEX: 33.28 KG/M2 | RESPIRATION RATE: 16 BRPM | DIASTOLIC BLOOD PRESSURE: 60 MMHG | HEART RATE: 66 BPM | SYSTOLIC BLOOD PRESSURE: 120 MMHG | WEIGHT: 200 LBS

## 2021-07-14 DIAGNOSIS — I50.20 HEART FAILURE WITH REDUCED EJECTION FRACTION, NYHA CLASS III (H): ICD-10-CM

## 2021-07-14 DIAGNOSIS — I34.0 NONRHEUMATIC MITRAL VALVE REGURGITATION: ICD-10-CM

## 2021-07-14 DIAGNOSIS — Z95.2 S/P AVR (AORTIC VALVE REPLACEMENT): ICD-10-CM

## 2021-07-14 DIAGNOSIS — R60.0 LOCALIZED EDEMA: ICD-10-CM

## 2021-07-14 DIAGNOSIS — I48.21 PERMANENT ATRIAL FIBRILLATION (H): Primary | ICD-10-CM

## 2021-07-14 DIAGNOSIS — Q23.1 CONGENITAL INSUFFICIENCY OF AORTIC VALVE: ICD-10-CM

## 2021-07-14 LAB
ANION GAP SERPL CALCULATED.3IONS-SCNC: 10 MMOL/L (ref 5–18)
BUN SERPL-MCNC: 31 MG/DL (ref 8–28)
CALCIUM SERPL-MCNC: 9.9 MG/DL (ref 8.5–10.5)
CHLORIDE BLD-SCNC: 103 MMOL/L (ref 98–107)
CO2 SERPL-SCNC: 24 MMOL/L (ref 22–31)
CREAT SERPL-MCNC: 1.16 MG/DL (ref 0.7–1.3)
GFR SERPL CREATININE-BSD FRML MDRD: 61 ML/MIN/1.73M2
GLUCOSE BLD-MCNC: 204 MG/DL (ref 70–125)
POTASSIUM BLD-SCNC: 4.1 MMOL/L (ref 3.5–5)
SODIUM SERPL-SCNC: 137 MMOL/L (ref 136–145)

## 2021-07-14 PROCEDURE — 80048 BASIC METABOLIC PNL TOTAL CA: CPT | Performed by: INTERNAL MEDICINE

## 2021-07-14 PROCEDURE — 99214 OFFICE O/P EST MOD 30 MIN: CPT | Performed by: INTERNAL MEDICINE

## 2021-07-14 PROCEDURE — 83880 ASSAY OF NATRIURETIC PEPTIDE: CPT | Performed by: INTERNAL MEDICINE

## 2021-07-14 PROCEDURE — 36415 COLL VENOUS BLD VENIPUNCTURE: CPT | Performed by: INTERNAL MEDICINE

## 2021-07-14 NOTE — LETTER
7/14/2021    Joseph COSTELLO [facility] [place] [address] [place] [address]    RE: Sorin Weeks       Dear Colleague,    I had the pleasure of seeing Sorin Weeks in the [facility].    Here today to discuss the results of the transesophageal echocardiogram.  Patient clearly had congestive heart failure and has significant improvement in his functional capacity and breathing after increasing his diuretics.  He has lost about 11 pounds.    His transesophageal echocardiogram showed moderate paravalvular aortic insufficiency.  The mitral valve however had quite significant insufficiency and probably severe close to severe.  The JULI was technically somewhat challenging.    We will ask him to be seen by our valve clinic for further recommendations.  If it is a long time before this referral can be accomplished, I think we would set him up for right heart cath, coronary angiogram and probably an aortic root shot to see the aortic insufficiency with a different image.    30 minutes spent with this visit including chart review, patient visit, and documentation and care coordination.              Thank you for allowing me to participate in the care of your patient.      Sincerely,     Jhony Campos MD     [facility]  cc:   No referring provider defined for this encounter.

## 2021-07-14 NOTE — PROGRESS NOTES
Here today to discuss the results of the transesophageal echocardiogram.  Patient clearly had congestive heart failure and has significant improvement in his functional capacity and breathing after increasing his diuretics.  He has lost about 11 pounds.    His transesophageal echocardiogram showed moderate paravalvular aortic insufficiency.  The mitral valve however had quite significant insufficiency and probably severe close to severe.  The JULI was technically somewhat challenging.    We will ask him to be seen by our valve clinic for further recommendations.  If it is a long time before this referral can be accomplished, I think we would set him up for right heart cath, coronary angiogram and probably an aortic root shot to see the aortic insufficiency with a different image.    30 minutes spent with this visit including chart review, patient visit, and documentation and care coordination.

## 2021-07-15 ENCOUNTER — TELEPHONE (OUTPATIENT)
Dept: CARDIOLOGY | Facility: CLINIC | Age: 76
End: 2021-07-15

## 2021-07-15 DIAGNOSIS — I47.29 NSVT (NONSUSTAINED VENTRICULAR TACHYCARDIA) (H): ICD-10-CM

## 2021-07-15 DIAGNOSIS — Z95.2 S/P AVR (AORTIC VALVE REPLACEMENT): ICD-10-CM

## 2021-07-15 DIAGNOSIS — I50.20 HEART FAILURE WITH REDUCED EJECTION FRACTION, NYHA CLASS III (H): ICD-10-CM

## 2021-07-15 DIAGNOSIS — I35.1 AORTIC VALVE INSUFFICIENCY, ETIOLOGY OF CARDIAC VALVE DISEASE UNSPECIFIED: Primary | ICD-10-CM

## 2021-07-15 LAB — NT-PROBNP SERPL-MCNC: 7991 PG/ML (ref 0–450)

## 2021-07-15 NOTE — TELEPHONE ENCOUNTER
---- Message -----  From: Jhony Campos MD  Sent: 7/15/2021  12:55 PM CDT  To: Priti Santiago, RN    Yes,    [facility] aortic root shot and coronaries with Ranjana or Heather  ----- Message -----  From: Priti Santiago RN  Sent: 7/15/2021  12:05 PM CDT  To: Jhony Campos MD    See below... Do you want cath ordered prior? -ejb    ----- Message -----  From: Erica Leggett  Sent: 7/14/2021   4:11 PM CDT  To: Priti Santiago, NIRALI, *    Dex Marcos,    Patient is scheduled on 08/12 in the valve clinic to see Dr. Chilel.  Per Dr. Campos's note today, he stated to possibly get a cath if the schedule was booked out far.    Do you want to go ahead and order a cath for patient?    Thanks,  Erica

## 2021-07-20 DIAGNOSIS — Z11.59 ENCOUNTER FOR SCREENING FOR OTHER VIRAL DISEASES: ICD-10-CM

## 2021-07-20 NOTE — TELEPHONE ENCOUNTER
Dr. Romero, in the absence of Dr. Campos please review. Time sensitive to be able to prep pt for procedure on 7/30/21. Arranging patient for CORS poss PCI, RHC and aortic root shot, scheduled with MY for 7/30/21. Warfarin management for a Mechanical AVR. Please give orders for Warfarin management pre-procedure. Ok to Hold x 4 days? Bridging needed? Thank you LEEROY,Rn

## 2021-07-20 NOTE — TELEPHONE ENCOUNTER
===View-only below this line===  ----- Message -----  From: Erica Leggett  Sent: 7/20/2021   3:14 PM CDT  To: Steve Kaufman RN    Cath scheduled with MY 7/30-admit at 10am  COVID in [facility] 7/27  Patient will call to get his H&P done.    Thanks!  Erica

## 2021-07-20 NOTE — TELEPHONE ENCOUNTER
Spoke with Valve clinic scheduler. Case request and Covid test orders placed.    ALERTS: Diabetic, PPM-[facility], AVR-mechanical on Warfarin therapy. Needs H&P with PCP, last OV with cards 6/8/21. Arrange with only MY or MA, Valve Clinic providers. Await date/time scheduled to move forward with preparation. Hugo UMAÑA     Pt has INR and Anticoagulation care through Dr. Quevedo, and [facility] clinic. Will call to discuss Warfarin management for angiogram procedure. THJ out of office til 7/26/21. CMM,Rn

## 2021-07-21 NOTE — TELEPHONE ENCOUNTER
===View-only below this line===  ----- Message -----  From: Leonel Romero MD  Sent: 7/21/2021   8:25 AM CDT  To: Steve Kaufman RN, Jhony Campos MD    4 days sounds okay.  I don't mind lovenox bridging, to be set up per his anticoagulation clinic.    Thx!  Leonel

## 2021-07-22 NOTE — TELEPHONE ENCOUNTER
PC to PCP/ACOAG provider to review recommendations. Discussion with INR/Anticoag nurse Mchugh, and made request per cardiology. Nurse Mistry, will forward requests to Dr. Quevedo, and request recommendations.  Discussion of 4 day hold, bridge with lovenox, and that last dose of lovenox, typically given evening prior, and none morning of procedure, if Dr. Quevedo agreeable. Will return call, and help with instructing patient. Await return call. LEEROY,Rn

## 2021-07-23 NOTE — TELEPHONE ENCOUNTER
PC with the patient and his wife. Has received call from the ANCOAG team, and awaiting final directions. Patient and wife unsure as to management of Warfarin at this time. Will call to facilitate and make sure no further questions from ANCOAG team. Discussion with patient that Warfarin management will come from ANCOAG team, but cardiology has requested a 4 day hold, and lovenox bridging. Wife and patient verbalized understanding. Pt has bridged with lovenox injection before. Will await final recommendations from PCP/ANCOAG team. Also, arranged via phone teach for Tu 7/27 at 1300, writer will call to discuss. CMM,Rn

## 2021-07-23 NOTE — TELEPHONE ENCOUNTER
PC with patient and discussion of plan of care. Has received instruction for Warfarin management for upcoming procedure from the ANCOAG nurses at [facility]. Will  Lovenox over the weekend. LEEROY,RN

## 2021-07-27 ENCOUNTER — PREP FOR PROCEDURE (OUTPATIENT)
Dept: CARDIOLOGY | Facility: CLINIC | Age: 76
End: 2021-07-27

## 2021-07-27 ENCOUNTER — LAB (OUTPATIENT)
Dept: LAB | Facility: CLINIC | Age: 76
End: 2021-07-27
Payer: MEDICARE

## 2021-07-27 DIAGNOSIS — Z95.2 S/P AVR (AORTIC VALVE REPLACEMENT): ICD-10-CM

## 2021-07-27 DIAGNOSIS — I35.1 AORTIC VALVE INSUFFICIENCY, ETIOLOGY OF CARDIAC VALVE DISEASE UNSPECIFIED: ICD-10-CM

## 2021-07-27 DIAGNOSIS — Z95.0 PACEMAKER: ICD-10-CM

## 2021-07-27 DIAGNOSIS — Z11.59 ENCOUNTER FOR SCREENING FOR OTHER VIRAL DISEASES: ICD-10-CM

## 2021-07-27 DIAGNOSIS — I50.20 HEART FAILURE WITH REDUCED EJECTION FRACTION, NYHA CLASS III (H): Primary | ICD-10-CM

## 2021-07-27 LAB — SARS-COV-2 RNA RESP QL NAA+PROBE: NEGATIVE

## 2021-07-27 PROCEDURE — U0003 INFECTIOUS AGENT DETECTION BY NUCLEIC ACID (DNA OR RNA); SEVERE ACUTE RESPIRATORY SYNDROME CORONAVIRUS 2 (SARS-COV-2) (CORONAVIRUS DISEASE [COVID-19]), AMPLIFIED PROBE TECHNIQUE, MAKING USE OF HIGH THROUGHPUT TECHNOLOGIES AS DESCRIBED BY CMS-2020-01-R: HCPCS

## 2021-07-27 PROCEDURE — U0005 INFEC AGEN DETEC AMPLI PROBE: HCPCS

## 2021-07-27 RX ORDER — FENTANYL CITRATE 50 UG/ML
25 INJECTION, SOLUTION INTRAMUSCULAR; INTRAVENOUS
Status: DISCONTINUED | OUTPATIENT
Start: 2021-07-27 | End: 2021-07-27 | Stop reason: HOSPADM

## 2021-07-27 RX ORDER — NICOTINE POLACRILEX 4 MG
15-30 LOZENGE BUCCAL
Status: CANCELLED | OUTPATIENT
Start: 2021-07-30

## 2021-07-27 RX ORDER — LIDOCAINE 40 MG/G
CREAM TOPICAL
Status: CANCELLED | OUTPATIENT
Start: 2021-07-27

## 2021-07-27 RX ORDER — ASPIRIN 325 MG
325 TABLET ORAL ONCE
Status: CANCELLED | OUTPATIENT
Start: 2021-07-30 | End: 2021-07-27

## 2021-07-27 RX ORDER — ASPIRIN 81 MG/1
243 TABLET, CHEWABLE ORAL ONCE
Status: CANCELLED | OUTPATIENT
Start: 2021-07-30

## 2021-07-27 RX ORDER — DEXTROSE MONOHYDRATE 25 G/50ML
25-50 INJECTION, SOLUTION INTRAVENOUS
Status: CANCELLED | OUTPATIENT
Start: 2021-07-30

## 2021-07-27 RX ORDER — DIAZEPAM 5 MG
5 TABLET ORAL
Status: CANCELLED | OUTPATIENT
Start: 2021-07-30

## 2021-07-27 RX ORDER — SODIUM CHLORIDE 9 MG/ML
INJECTION, SOLUTION INTRAVENOUS CONTINUOUS
Status: CANCELLED | OUTPATIENT
Start: 2021-07-30

## 2021-07-27 NOTE — TELEPHONE ENCOUNTER
PC with patient, and patient wife via speaker phone. Discussion and education of procedure. Education completed. Opportunity to ask questions and have them answered. None further at this time. Pt has stopped Warfarin, taking his last dose on Saturday.Bridging with Lovenox. Instructions provided by PCP/Warfarin management.   Wife reports that enough Lovenox was given for pre-procedural care, but none given for post-procedure. Encouraged to discuss at hospital at time of discharge, once post-procedure plan known. Wife verbalized understanding. Will document teach and place orders. Pt ready for procedure. LEEROY,RN

## 2021-07-27 NOTE — PROGRESS NOTES
Sorin Weeks  [address] [address]  [phone] (home) [phone] (work)    Procedure cardiologist:  Dr. Romero  PCP:  Joseph Quevedo  H&P completed by:  Dr. Romero 7/23/21, viewable in Epic  Admit date 07/30/21  Arrival time:  0730  Anticoagulation: Warfarin and Lovenox bridging. Last dose of Warfarin taken 7/24/21.   CPAP: No  Previous PCI: None.  Bypass Grafts: No. Aortic Valve replacement 2013.  Renal Issues: No  Diabetic?: Yes  Device?: Yes  Type:  [facility] PPM  Covid-19 Test Date: 7/27/21  Location: [facility], viewable in [employer]. Patient understands after they get their test, they are to self-isolate at home until their procedure. They will only be called back if there results are positive.  Ambulatory?: Independently.    Angiogram Teaching    Reason for Visit:  Telephone call to discuss pre-procedure education in preparation for: Coronary Angiogram, Right Heart Catheterization with Possible Percutaneous Coronary Intervention     Procedure Prep:  Primary Cardiologist note dated: 6/8/21  EKG results obtained, dated: Morning of procedure  Hemogram results obtained: Morning of procedure  Basic Metabolic Panel results obtained: Morning of procedure  Lipid Profile results obtained: Morning of procedure    Pre-procedure instructions  Patient instructed to be NPO after midnight.  Patient instructed to shower the evening before or the morning of the procedure.  Leave all valuables at home (jewlery, rings, watches, large amounts of money).  Patient understands there is one visitor allowed during patients stay. Visitor will need to wear a mask their entire stay and remain in the restricted area per guidelines.   Patient instructed to arrange for transportation home following procedure from a responsible family member of friend. No driving for at least 24 hours post-procedure.  Patient instructed to have a responsible adult with them for 24 hours  post-procedure.  Post-procedure follow up process.  Conscious sedation discussed.    Pre-procedure medication instructions  Patient instructed on antiplatelet medication.  Continue medications as scheduled, with a small amount of water on the day of the procedure unless indicated.  Patient instructed to take 325 mg of Aspirin am of procedure: Yes  Other medication: Morning of procedure please HOLD, Metformin, Furosemide, Glipizide, and Lisinopril/HCTZ all vitamins, minerals, and supplements. Please HOLD Warfarin, and bridge with Lovenox as directed by your anticoagulation care team. Please TAKE (4) baby aspirin or (1) full size 325 mg aspirin morning of procedure.    *PATIENTS RECORDS AVAILABLE IN [facility] UNLESS OTHERWISE INDICATED*      Patient Active Problem List   Diagnosis     Third Degree A-V Block     Permanent Atrial Fibrillation     Pacemaker     Aortic insufficiency     S/P AVR (aortic valve replacement)     NSVT (nonsustained ventricular tachycardia) (H)     Heart failure with reduced ejection fraction, NYHA class III (H)     Localized edema       Current Outpatient Medications   Medication Sig Dispense Refill     CHEWABLE MULTI VITAMIN ORAL [CHEWABLE MULTI VITAMIN ORAL] Take 2 tablets by mouth daily.       enoxaparin ANTICOAGULANT (LOVENOX) 80 MG/0.8ML syringe Inject 80 mg Subcutaneous every 12 hours       finasteride (PROSCAR) 5 mg tablet [FINASTERIDE (PROSCAR) 5 MG TABLET] 5 mg. Take one tablet by mouth daily.  5     furosemide (LASIX) 20 MG tablet [FUROSEMIDE (LASIX) 20 MG TABLET] Take 1 tablet (20 mg total) by mouth daily. Take an extra 20 mg tablet of furosemide each morning with your current morning dose of 20 mg that is prepackaged.  Your total dose in the morning will be 40 mg and your total dose in the evening is 20 mg. 50 tablet 3     furosemide (LASIX) 20 MG tablet [FUROSEMIDE (LASIX) 20 MG TABLET] Take 20 mg by mouth 2 (two) times a day. 40 mg qam and 20 mg in the evening per pt       glipiZIDE  "(GLUCOTROL) 10 MG 24 hr tablet [GLIPIZIDE (GLUCOTROL) 10 MG 24 HR TABLET] Take 10 mg by mouth daily.       lisinopril-hydrochlorothiazide (PRINZIDE,ZESTORETIC) 20-12.5 mg per tablet [LISINOPRIL-HYDROCHLOROTHIAZIDE (PRINZIDE,ZESTORETIC) 20-12.5 MG PER TABLET] Take 1 tablet by mouth daily.  0     metFORMIN (GLUCOPHAGE) 500 MG tablet [METFORMIN (GLUCOPHAGE) 500 MG TABLET] Take 500 mg by mouth 2 (two) times a day.       metoprolol succinate (TOPROL-XL) 100 MG 24 hr tablet [METOPROLOL SUCCINATE (TOPROL-XL) 100 MG 24 HR TABLET] Take 100 mg by mouth daily.       multivit,calc,mins/folic acid (ONE-A-DAY PROACTIVE 65 PLUS ORAL) [MULTIVIT,CALC,MINS/FOLIC ACID (ONE-A-DAY PROACTIVE 65 PLUS ORAL)] Take 1 tablet by mouth daily.       tamsulosin (FLOMAX) 0.4 mg Cp24 [TAMSULOSIN (FLOMAX) 0.4 MG CP24] 0.4 mg Daily after breakfast.   5     warfarin (COUMADIN) 5 MG tablet [WARFARIN (COUMADIN) 5 MG TABLET] Take 5mg daiy except on Tuesdays take 1/2 tab (2.5mg) - Take as directed- ON HOLD for procedure.         Allergies   Allergen Reactions     Atenolol Unknown     Buprenorphine Hcl [Buprenorphine] Other (See Comments)     pt. felt \"loopy\"     Codeine Unknown     Penicillins Unknown     Sulfa (Sulfonamide Antibiotics) [Sulfa Drugs] Unknown     Vancomycin Unknown       Plan: Patient education completed. Opportunity to ask questions and have them answered. None further at this time. Patient verbalized understanding of responsible adult for 24 hours and  post-procedure at time of discharge. Pt ready for procedure.       RN Steve Kaufman RN                       "

## 2021-07-30 ENCOUNTER — HOSPITAL ENCOUNTER (OUTPATIENT)
Facility: HOSPITAL | Age: 76
Discharge: HOME | End: 2021-07-30
Attending: INTERNAL MEDICINE | Admitting: INTERNAL MEDICINE
Payer: MEDICARE

## 2021-07-30 VITALS
TEMPERATURE: 98 F | HEIGHT: 64 IN | SYSTOLIC BLOOD PRESSURE: 133 MMHG | DIASTOLIC BLOOD PRESSURE: 61 MMHG | BODY MASS INDEX: 32.27 KG/M2 | RESPIRATION RATE: 20 BRPM | HEART RATE: 65 BPM | OXYGEN SATURATION: 95 % | WEIGHT: 189 LBS

## 2021-07-30 DIAGNOSIS — I50.20 HEART FAILURE WITH REDUCED EJECTION FRACTION, NYHA CLASS III (H): ICD-10-CM

## 2021-07-30 DIAGNOSIS — Z98.890 STATUS POST CORONARY ANGIOGRAM: Primary | ICD-10-CM

## 2021-07-30 DIAGNOSIS — I47.29 NSVT (NONSUSTAINED VENTRICULAR TACHYCARDIA) (H): ICD-10-CM

## 2021-07-30 DIAGNOSIS — Z95.2 S/P AVR (AORTIC VALVE REPLACEMENT): ICD-10-CM

## 2021-07-30 DIAGNOSIS — I35.1 AORTIC VALVE INSUFFICIENCY, ETIOLOGY OF CARDIAC VALVE DISEASE UNSPECIFIED: ICD-10-CM

## 2021-07-30 DIAGNOSIS — Z95.0 PACEMAKER: ICD-10-CM

## 2021-07-30 DIAGNOSIS — I48.21 PERMANENT ATRIAL FIBRILLATION (H): ICD-10-CM

## 2021-07-30 LAB
ANION GAP SERPL CALCULATED.3IONS-SCNC: 4 MMOL/L (ref 5–18)
ATRIAL RATE - MUSE: 65 BPM
BASE EXCESS BLDA CALC-SCNC: 2.5 MMOL/L (ref -9.6–2)
BASE EXCESS BLDV CALC-SCNC: 4.4 MMOL/L (ref -8.1–1.9)
BUN SERPL-MCNC: 31 MG/DL (ref 8–28)
CALCIUM SERPL-MCNC: 10.3 MG/DL (ref 8.5–10.5)
CHLORIDE BLD-SCNC: 106 MMOL/L (ref 98–107)
CHOLEST SERPL-MCNC: 128 MG/DL
CO2 SERPL-SCNC: 27 MMOL/L (ref 22–31)
COHGB MFR BLD: 95.4 % (ref 95–96)
CREAT SERPL-MCNC: 1.09 MG/DL (ref 0.7–1.3)
DIASTOLIC BLOOD PRESSURE - MUSE: NORMAL MMHG
ERYTHROCYTE [DISTWIDTH] IN BLOOD BY AUTOMATED COUNT: 15.5 % (ref 10–15)
FASTING STATUS PATIENT QL REPORTED: YES
GFR SERPL CREATININE-BSD FRML MDRD: 66 ML/MIN/1.73M2
GLUCOSE BLD-MCNC: 116 MG/DL (ref 70–125)
HCO3 BLDA-SCNC: 26 MMOL/L (ref 23–29)
HCO3 BLDV-SCNC: 27 MMOL/L (ref 24–30)
HCT VFR BLD AUTO: 37.3 % (ref 40–53)
HDLC SERPL-MCNC: 28 MG/DL
HGB BLD-MCNC: 12.3 G/DL (ref 13.3–17.7)
INR PPP: 1.21 (ref 0.9–1.15)
INTERPRETATION ECG - MUSE: NORMAL
LDLC SERPL CALC-MCNC: 74 MG/DL
MCH RBC QN AUTO: 31.5 PG (ref 26.5–33)
MCHC RBC AUTO-ENTMCNC: 33 G/DL (ref 31.5–36.5)
MCV RBC AUTO: 95 FL (ref 78–100)
P AXIS - MUSE: NORMAL DEGREES
PCO2 BLDA: 40 MM HG (ref 35–45)
PCO2 BLDV: 47 MM HG (ref 35–50)
PH BLDA: 7.44 [PH] (ref 7.37–7.44)
PH BLDV: 7.4 [PH] (ref 7.35–7.45)
PLATELET # BLD AUTO: 135 10E3/UL (ref 150–450)
PO2 BLDA: 73 MM HG (ref 75–85)
PO2 BLDV: 33 MM HG (ref 25–47)
POTASSIUM BLD-SCNC: 4.2 MMOL/L (ref 3.5–5)
PR INTERVAL - MUSE: NORMAL MS
QRS DURATION - MUSE: 216 MS
QT - MUSE: 500 MS
QTC - MUSE: 520 MS
R AXIS - MUSE: -75 DEGREES
RBC # BLD AUTO: 3.91 10E6/UL (ref 4.4–5.9)
SATV LHE POCT: 60.7 (ref 70–75)
SODIUM SERPL-SCNC: 137 MMOL/L (ref 136–145)
SYSTOLIC BLOOD PRESSURE - MUSE: NORMAL MMHG
T AXIS - MUSE: 83 DEGREES
TRIGL SERPL-MCNC: 129 MG/DL
VENTRICULAR RATE- MUSE: 65 BPM
WBC # BLD AUTO: 6.1 10E3/UL (ref 4–11)

## 2021-07-30 PROCEDURE — 93460 R&L HRT ART/VENTRICLE ANGIO: CPT | Mod: 26 | Performed by: INTERNAL MEDICINE

## 2021-07-30 PROCEDURE — 93460 R&L HRT ART/VENTRICLE ANGIO: CPT | Performed by: INTERNAL MEDICINE

## 2021-07-30 PROCEDURE — 250N000013 HC RX MED GY IP 250 OP 250 PS 637: Performed by: INTERNAL MEDICINE

## 2021-07-30 PROCEDURE — 85027 COMPLETE CBC AUTOMATED: CPT | Performed by: INTERNAL MEDICINE

## 2021-07-30 PROCEDURE — C1894 INTRO/SHEATH, NON-LASER: HCPCS | Performed by: INTERNAL MEDICINE

## 2021-07-30 PROCEDURE — 80061 LIPID PANEL: CPT | Performed by: INTERNAL MEDICINE

## 2021-07-30 PROCEDURE — 255N000002 HC RX 255 OP 636: Performed by: INTERNAL MEDICINE

## 2021-07-30 PROCEDURE — 75605 CONTRAST EXAM THORACIC AORTA: CPT | Mod: XU | Performed by: INTERNAL MEDICINE

## 2021-07-30 PROCEDURE — 75605 CONTRAST EXAM THORACIC AORTA: CPT | Mod: 26 | Performed by: INTERNAL MEDICINE

## 2021-07-30 PROCEDURE — 85610 PROTHROMBIN TIME: CPT | Performed by: INTERNAL MEDICINE

## 2021-07-30 PROCEDURE — 999N000054 HC STATISTIC EKG NON-CHARGEABLE

## 2021-07-30 PROCEDURE — 93010 ELECTROCARDIOGRAM REPORT: CPT | Mod: HOP | Performed by: INTERNAL MEDICINE

## 2021-07-30 PROCEDURE — 82803 BLOOD GASES ANY COMBINATION: CPT | Mod: 91

## 2021-07-30 PROCEDURE — C1887 CATHETER, GUIDING: HCPCS | Performed by: INTERNAL MEDICINE

## 2021-07-30 PROCEDURE — 272N000001 HC OR GENERAL SUPPLY STERILE: Performed by: INTERNAL MEDICINE

## 2021-07-30 PROCEDURE — 250N000011 HC RX IP 250 OP 636: Performed by: INTERNAL MEDICINE

## 2021-07-30 PROCEDURE — 80048 BASIC METABOLIC PNL TOTAL CA: CPT | Performed by: INTERNAL MEDICINE

## 2021-07-30 PROCEDURE — 93005 ELECTROCARDIOGRAM TRACING: CPT

## 2021-07-30 PROCEDURE — 36415 COLL VENOUS BLD VENIPUNCTURE: CPT | Performed by: INTERNAL MEDICINE

## 2021-07-30 PROCEDURE — 258N000003 HC RX IP 258 OP 636: Performed by: INTERNAL MEDICINE

## 2021-07-30 PROCEDURE — 82805 BLOOD GASES W/O2 SATURATION: CPT

## 2021-07-30 PROCEDURE — 250N000009 HC RX 250: Performed by: INTERNAL MEDICINE

## 2021-07-30 PROCEDURE — C1769 GUIDE WIRE: HCPCS | Performed by: INTERNAL MEDICINE

## 2021-07-30 RX ORDER — DIAZEPAM 5 MG
5 TABLET ORAL
Status: COMPLETED | OUTPATIENT
Start: 2021-07-30 | End: 2021-07-30

## 2021-07-30 RX ORDER — FENTANYL CITRATE 50 UG/ML
25 INJECTION, SOLUTION INTRAMUSCULAR; INTRAVENOUS
Status: DISCONTINUED | OUTPATIENT
Start: 2021-07-30 | End: 2021-07-30 | Stop reason: HOSPADM

## 2021-07-30 RX ORDER — ATROPINE SULFATE 0.1 MG/ML
0.5 INJECTION INTRAVENOUS
Status: DISCONTINUED | OUTPATIENT
Start: 2021-07-30 | End: 2021-07-30 | Stop reason: HOSPADM

## 2021-07-30 RX ORDER — GLIPIZIDE 10 MG/1
10 TABLET, FILM COATED, EXTENDED RELEASE ORAL DAILY
Status: DISCONTINUED | OUTPATIENT
Start: 2021-07-30 | End: 2021-07-30 | Stop reason: CLARIF

## 2021-07-30 RX ORDER — FUROSEMIDE 20 MG
20 TABLET ORAL 2 TIMES DAILY
Status: DISCONTINUED | OUTPATIENT
Start: 2021-07-30 | End: 2021-07-30 | Stop reason: CLARIF

## 2021-07-30 RX ORDER — METOPROLOL SUCCINATE 100 MG/1
100 TABLET, EXTENDED RELEASE ORAL DAILY
Status: DISCONTINUED | OUTPATIENT
Start: 2021-07-30 | End: 2021-07-30 | Stop reason: CLARIF

## 2021-07-30 RX ORDER — LISINOPRIL AND HYDROCHLOROTHIAZIDE 12.5; 2 MG/1; MG/1
1 TABLET ORAL DAILY
Status: DISCONTINUED | OUTPATIENT
Start: 2021-07-30 | End: 2021-07-30 | Stop reason: CLARIF

## 2021-07-30 RX ORDER — NALOXONE HYDROCHLORIDE 0.4 MG/ML
0.2 INJECTION, SOLUTION INTRAMUSCULAR; INTRAVENOUS; SUBCUTANEOUS
Status: DISCONTINUED | OUTPATIENT
Start: 2021-07-30 | End: 2021-07-30 | Stop reason: HOSPADM

## 2021-07-30 RX ORDER — NALOXONE HYDROCHLORIDE 0.4 MG/ML
0.4 INJECTION, SOLUTION INTRAMUSCULAR; INTRAVENOUS; SUBCUTANEOUS
Status: DISCONTINUED | OUTPATIENT
Start: 2021-07-30 | End: 2021-07-30 | Stop reason: HOSPADM

## 2021-07-30 RX ORDER — IODIXANOL 320 MG/ML
INJECTION, SOLUTION INTRAVASCULAR
Status: DISCONTINUED | OUTPATIENT
Start: 2021-07-30 | End: 2021-07-30 | Stop reason: HOSPADM

## 2021-07-30 RX ORDER — WARFARIN SODIUM 5 MG/1
5 TABLET ORAL
Status: DISCONTINUED | OUTPATIENT
Start: 2021-07-30 | End: 2021-07-30 | Stop reason: CLARIF

## 2021-07-30 RX ORDER — SODIUM CHLORIDE 9 MG/ML
INJECTION, SOLUTION INTRAVENOUS CONTINUOUS
Status: DISCONTINUED | OUTPATIENT
Start: 2021-07-30 | End: 2021-07-30 | Stop reason: HOSPADM

## 2021-07-30 RX ORDER — NICOTINE POLACRILEX 4 MG
15-30 LOZENGE BUCCAL
Status: DISCONTINUED | OUTPATIENT
Start: 2021-07-30 | End: 2021-07-30 | Stop reason: HOSPADM

## 2021-07-30 RX ORDER — ASPIRIN 325 MG
325 TABLET ORAL ONCE
Status: DISCONTINUED | OUTPATIENT
Start: 2021-07-30 | End: 2021-07-30 | Stop reason: HOSPADM

## 2021-07-30 RX ORDER — FENTANYL CITRATE 50 UG/ML
INJECTION, SOLUTION INTRAMUSCULAR; INTRAVENOUS
Status: DISCONTINUED | OUTPATIENT
Start: 2021-07-30 | End: 2021-07-30 | Stop reason: HOSPADM

## 2021-07-30 RX ORDER — LIDOCAINE 40 MG/G
CREAM TOPICAL
Status: DISCONTINUED | OUTPATIENT
Start: 2021-07-30 | End: 2021-07-30 | Stop reason: HOSPADM

## 2021-07-30 RX ORDER — FLUMAZENIL 0.1 MG/ML
0.2 INJECTION, SOLUTION INTRAVENOUS
Status: DISCONTINUED | OUTPATIENT
Start: 2021-07-30 | End: 2021-07-30 | Stop reason: HOSPADM

## 2021-07-30 RX ORDER — MULTIVIT,CALC,MINS/FOLIC ACID 200 MCG
TABLET ORAL DAILY
Status: DISCONTINUED | OUTPATIENT
Start: 2021-07-30 | End: 2021-07-30 | Stop reason: CLARIF

## 2021-07-30 RX ORDER — TAMSULOSIN HYDROCHLORIDE 0.4 MG/1
0.4 CAPSULE ORAL DAILY
Status: DISCONTINUED | OUTPATIENT
Start: 2021-07-30 | End: 2021-07-30 | Stop reason: CLARIF

## 2021-07-30 RX ORDER — FINASTERIDE 5 MG/1
5 TABLET, FILM COATED ORAL DAILY
Status: DISCONTINUED | OUTPATIENT
Start: 2021-07-30 | End: 2021-07-30 | Stop reason: CLARIF

## 2021-07-30 RX ORDER — FUROSEMIDE 20 MG
20 TABLET ORAL DAILY
Status: DISCONTINUED | OUTPATIENT
Start: 2021-07-30 | End: 2021-07-30 | Stop reason: CLARIF

## 2021-07-30 RX ORDER — ACETAMINOPHEN 325 MG/1
650 TABLET ORAL EVERY 4 HOURS PRN
Status: DISCONTINUED | OUTPATIENT
Start: 2021-07-30 | End: 2021-07-30 | Stop reason: HOSPADM

## 2021-07-30 RX ORDER — DEXTROSE MONOHYDRATE 25 G/50ML
25-50 INJECTION, SOLUTION INTRAVENOUS
Status: DISCONTINUED | OUTPATIENT
Start: 2021-07-30 | End: 2021-07-30 | Stop reason: HOSPADM

## 2021-07-30 RX ORDER — ASPIRIN 81 MG/1
243 TABLET, CHEWABLE ORAL ONCE
Status: DISCONTINUED | OUTPATIENT
Start: 2021-07-30 | End: 2021-07-30 | Stop reason: HOSPADM

## 2021-07-30 RX ADMIN — DIAZEPAM 5 MG: 5 TABLET ORAL at 10:44

## 2021-07-30 RX ADMIN — ACETAMINOPHEN 650 MG: 325 TABLET ORAL at 16:13

## 2021-07-30 RX ADMIN — SODIUM CHLORIDE: 9 INJECTION, SOLUTION INTRAVENOUS at 09:20

## 2021-07-30 ASSESSMENT — MIFFLIN-ST. JEOR: SCORE: 1503.3

## 2021-07-30 NOTE — PRE-PROCEDURE
GENERAL PRE-PROCEDURE:   Procedure:  Coronary angiogram with possible intervention; right heart cath  Date/Time:  7/30/2021 10:01 AM    Written consent obtained?: Yes    Risks and benefits: Risks, benefits and alternatives were discussed    Consent given by:  Patient  Patient states understanding of procedure being performed: Yes    Patient's understanding of procedure matches consent: Yes    Procedure consent matches procedure scheduled: Yes    Expected level of sedation:  Moderate  Appropriately NPO:  Yes  Mallampati  :  Grade 3- soft palate visible, posterior pharyngeal wall not visible  Lungs:  Lungs clear with good breath sounds bilaterally  Heart:  Normal heart sounds and rate  History & Physical reviewed:  History and physical reviewed and no updates needed  Statement of review:  I have reviewed the lab findings, diagnostic data, medications, and the plan for sedation

## 2021-07-30 NOTE — H&P
The History and Physical (done by Naresh Romero PA-C on July 23, 2021 at [facility]) has been reviewed, the patient has been examined and no changes have occurred in the patient's condition since the H & P was completed.     The H&P can be found in Care Everywhere.

## 2021-07-30 NOTE — Clinical Note
Prepped: groin, right radial and right brachial. Prepped with: ChloraPrep. The patient was draped. .Pre-procedure site marking:Insertion site not predetermined

## 2021-07-30 NOTE — DISCHARGE INSTRUCTIONS
Interventional Cardiology  Coronary Angiogram/Angioplasty/Stent/Atherectomy Discharge Instructions -   Femoral Approach    The instructions below are to help you understand how to take care of yourself. There is also information about when to call the doctor or emergency services    **Do not stop your aspirin or platelet inhibitor unless directed by your Cardiologist.  These medications help to prevent platelets in your blood from sticking together and forming a clot.  Examples of these medications are:  Ticagrelor (Brilinta), Clopidigrel (Plavix), Prasugrel (Effient)          For the first 72 hours after your procedure:    No driving for 24 hours    Do not lift more than 15 pounds.  If you usually lift 50 pounds or more daily, please contact your cardiologist    Avoid any hard work or tiring activities, this includes, yard work, jogging, biking, sexual activity    During the day get up and walk around every 2 hours    You may return to work after 72 hours if you are feeling well and your job does not involve heavy lifting          Groin Site / Wound Care / Bleeding      You may take off the dressing on your groin the day after your procedure    Keep the area dry and clean    It is ok to shower with regular soap.  Pat dry, do not rub    You do not need to use a bandage    No tub/pool or hot tub for 1 week    If your groin starts to bleed or begins to swell suddenly after leaving the hospital, lie flat and apply firm pressure above the site for 15 minutes.  If bleeding continues, call 9-1-1    Bruising around the groin area is normal.  It may take 2-3 weeks for this to go away.  It is normal for the bruised area to turn green and/or yellow as it is healing.  A small lump may also be present and may last 2-3 months.    Your leg may be sore or stiff for a few days.  You may take Tylenol or a pain medicine recommended by your doctor    If you have a fever over 100.4, that lasts more than one day - call your  cardiologist.      Our Cardiac Rehab staff may visit briefly with you while your in the hospital.  If they miss you, someone will contact you after you are home.  You are encouraged to enroll in an Outpatient Cardiac Rehab Program       ? No driving for 24 hours          [facility]:  [phone]  If you are calling after hours, please listen to the entire voicemail, a live  will answer at the end of the message  Interventional Cardiology  Coronary Angiogram/Angioplasty/Stent/Atherectomy Discharge  Instructions -   Radial (wrist) Approach     The instructions below are to help you understand how to take care of yourself. There is also information about when to call the doctor or emergency services.    **Do not stop your aspirin or platelet inhibitor unless directed by your Cardiologist.  These medications help to prevent platelets in your blood from sticking together and forming a clot.   Examples of these medications are: Ticagrelor (Brilinta), Clopidogrel (Plavix), Prasugrel (Effient)    For 24 hours after procedure:    Do not subject hand/arm to any forceful movements for 24 hours, such as supporting weight when rising from a chair or bed.    Do not drive a car for 24 hours.    The dressing on the puncture site may be removed after 24 hours and left open to air. If minor oozing, you may apply a Band-Aid and remove after 12 hours.     You may shower on the day after your procedure. Do not take a tub bath or wash dishes (no soaking wrist) with the puncture site in water for 3 days after the procedure.    For 48 hours following the procedure:    Do not operate a lawnmower, motorcycle, chainsaw or all-terrain vehicle.    Do not lift anything heavier than 5-10 pounds with affected arm for 5 days.    Avoid excessive bending (flexion/extension) wrist movement.    Do not engage in vigorous exercise (i.e. tennis, golf) using the affected arm for 5 days after discharge.    You may return to  work in 72 hours if no complications and no heavy lifting.    If bleeding should occur following discharge:    Sit down and apply firm pressure with your thumb against the puncture site and fingers against back of wrist for 10 minutes.    If the bleeding stops, continue to rest, keeping your wrist still for 2 hours. Notify your doctor as soon as possible.    If bleeding does not stop after 10 minutes or if there is a large amount of bleeding or spurting, call 911 immediately. Do not drive yourself to the hospital.           Contact the Heart Clinic at [phone] if you develop:    Fever over 100.4, that lasts more than one day    Redness, heat, or pus at the puncture site    Change in color or temperature of the hand or arm    Expect mild tingling of hand and tenderness at the puncture site for up to 3 days. You may take Tylenol or a pain medicine recommended by your doctor.               Your Procedural Physician was: Dr. Leonel Romero  the phone number is: [phone]    [facility]:  [phone]  If you are calling after hours, please listen to the entire voicemail, a live  will answer at the end of the message

## 2021-07-31 NOTE — PLAN OF CARE
Problem: Adult Inpatient Plan of Care  Goal: Plan of Care Review  Outcome: Adequate for Discharge  Goal: Patient-Specific Goal (Individualized)  Outcome: Adequate for Discharge  Goal: Absence of Hospital-Acquired Illness or Injury  Outcome: Adequate for Discharge  Goal: Optimal Comfort and Wellbeing  Outcome: Adequate for Discharge  Goal: Readiness for Transition of Care  Outcome: Adequate for Discharge   Patient has discharged to home after right radial, right brachial, left femoral approach angiograms  Vital signs remained stable throughout the day  Consumed a good meal without problem  Discharge instructions reviewed post procedural cares, medication review, follow up appointments  Up ambulating on the unit and used the bathroom  Discharged to home accompanied by his wife

## 2021-08-04 ENCOUNTER — TELEPHONE (OUTPATIENT)
Dept: CARDIOLOGY | Facility: CLINIC | Age: 76
End: 2021-08-04

## 2021-08-04 NOTE — TELEPHONE ENCOUNTER
----- Message from Brooke Whaley sent at 8/4/2021 11:18 AM CDT -----    Caller: Patient  Primary cardiologist: Dr. Campos    Detailed reason for call: Patient stated that he needs a referral for an oral surgeon because of his angiogram.     Best phone number: [phone]  Best time to contact: today  Ok to leave a detailed message? yes  Device? Yes    Additional Info:

## 2021-08-04 NOTE — TELEPHONE ENCOUNTER
Return call to patient and wife. They are asking for referral to oral surgeon in anticipation of  valve repair. Advised patient and wife that will provide them with information for low-cost dental clinic because patient does not have dental insurance.     Ez sent with information for [facility] in [place]. -marie    ----- Message from Brooke Whaley sent at 8/4/2021 11:18 AM CDT -----    Caller: Patient  Primary cardiologist: Dr. Campos    Detailed reason for call: Patient stated that he needs a referral for an oral surgeon because of his angiogram.     Best phone number: [phone]  Best time to contact: today  Ok to leave a detailed message? yes  Device? Yes    Additional Info:

## 2021-08-12 ENCOUNTER — ALLIED HEALTH/NURSE VISIT (OUTPATIENT)
Dept: CARDIOLOGY | Facility: CLINIC | Age: 76
End: 2021-08-12
Attending: INTERNAL MEDICINE
Payer: MEDICARE

## 2021-08-12 ENCOUNTER — OFFICE VISIT (OUTPATIENT)
Dept: CARDIOLOGY | Facility: CLINIC | Age: 76
End: 2021-08-12
Attending: INTERNAL MEDICINE

## 2021-08-12 VITALS
RESPIRATION RATE: 14 BRPM | HEIGHT: 64 IN | DIASTOLIC BLOOD PRESSURE: 58 MMHG | BODY MASS INDEX: 33.63 KG/M2 | WEIGHT: 197 LBS | HEART RATE: 60 BPM | SYSTOLIC BLOOD PRESSURE: 142 MMHG

## 2021-08-12 VITALS
DIASTOLIC BLOOD PRESSURE: 58 MMHG | HEIGHT: 64 IN | WEIGHT: 197 LBS | BODY MASS INDEX: 33.63 KG/M2 | HEART RATE: 70 BPM | SYSTOLIC BLOOD PRESSURE: 142 MMHG | RESPIRATION RATE: 14 BRPM

## 2021-08-12 DIAGNOSIS — I48.21 PERMANENT ATRIAL FIBRILLATION (H): ICD-10-CM

## 2021-08-12 DIAGNOSIS — I34.0 SEVERE MITRAL REGURGITATION: Primary | ICD-10-CM

## 2021-08-12 DIAGNOSIS — Z95.2 S/P AVR (AORTIC VALVE REPLACEMENT): ICD-10-CM

## 2021-08-12 DIAGNOSIS — R60.0 LOCALIZED EDEMA: ICD-10-CM

## 2021-08-12 DIAGNOSIS — I50.20 HEART FAILURE WITH REDUCED EJECTION FRACTION, NYHA CLASS III (H): ICD-10-CM

## 2021-08-12 PROCEDURE — 99207 PR NO CHARGE NURSE ONLY: CPT

## 2021-08-12 PROCEDURE — 99215 OFFICE O/P EST HI 40 MIN: CPT | Performed by: INTERNAL MEDICINE

## 2021-08-12 ASSESSMENT — MIFFLIN-ST. JEOR
SCORE: 1539.59
SCORE: 1539.59

## 2021-08-12 NOTE — LETTER
8/12/2021    [facility] [address] [place] [address]    RE: Sorin Weeks       Dear Colleague,    I had the pleasure of seeing Sorin Weeks in the [facility].      HEART CARE ENCOUNTER CONSULTATON NOTE      [facility]  [phone]      Assessment/Recommendations   Assessment/Plan:  Severe mitral regurgitation: This is the likely etiology for Mr. Weeks's recent declining functional capacity and congestive heart failure.  He will benefit from mitral valve repair, and would be a good candidate for MitraClip given his age and prior sternotomy.  The risks, benefits and alternatives of transcatheter mitral valve repair were reviewed, and he would like to proceed.  He will need dental clearance prior to that, which is a necessary requirement prior to valve surgery.    Paravalvular aortic insufficiency: As this is in the mild-to-moderate range, it has been present for a number of years, it is unlikely to be the primary etiology for his recent decline and increasing symptoms.  This can be followed clinically for now, and if he does continue to have symptoms after successful mitral valve repair, transcatheter closure of the paravalvular insufficiency could be considered.    Coronary artery disease: Agree with ongoing medical management, given the absence of angina and single-vessel disease.     History of Present Illness/Subjective    HPI: Sorin Weeks is a pleasant 75 year old male with a history of bicuspid aortic valve status post surgical aortic valve replacement, receiving a [employer]igi mechanical prosthesis in 1995.  He has been known to have a paravalvular leak around the aortic prosthesis for a number of years that has been followed clinically and with echocardiograms.  More recently, he had a decline in his functional capacity with increasing dyspnea, peripheral edema and  "congestive heart failure.    To evaluate his aortic insufficiency, he had a transesophageal echocardiogram on July 7, 2021 which showed 3 jets around the valve with likely moderate paravalvular aortic insufficiency in aggregate.  The JULI also however showed moderate to severe mitral regurgitation.  Left ventricular systolic function was low normal at 55%.  He subsequently had right left heart catheterization with coronary angiography that again showed mild to moderate aortic insufficiency.  There were prominent V waves on his pulmonary capillary wedge tracing with high pulmonary artery systolic pressures, consistent with severe mitral regurgitation.  His coronary angiogram showed a chronically occluded second obtuse marginal, with the distal vessel filling via collaterals.       Physical Examination  Review of Systems   Vitals: BP (!) 142/58 (BP Location: Left arm, Patient Position: Sitting, Cuff Size: Adult Large)   Pulse 60   Resp 14   Ht 1.626 m (5' 4\")   Wt 89.4 kg (197 lb)   BMI 33.81 kg/m    BMI= Body mass index is 33.81 kg/m .  Wt Readings from Last 3 Encounters:   08/12/21 89.4 kg (197 lb)   08/12/21 89.4 kg (197 lb)   07/30/21 85.7 kg (189 lb)       General Appearance:   no distress, normal body habitus, upright.   ENT/Mouth: membranes moist, no nasal discharge or bleeding gums.  Normal head shape, no evidence of injury or laceration.     EYES:  no scleral icterus, normal conjunctivae   Neck: no evidence of thyromegaly.  Supple   Chest/Lungs:   No audible wheezing equal chest wall expansion. Non labored breathing.  No cough.   Cardiovascular:   No evidence of elevated jugular venous pressure.  No evidence of pitting edema bilaterally    Abdomen:  no evidence of abdominal distention. No observe juandice.     Extremities: no cyanosis or clubbing noted.    Skin: no xanthelasma, normal skin color. No evidence of facial lacerations.      Neurologic: Normal arm motion bilateral, no tremors.  No evidence of " focal defect.       Psychiatric: alert and oriented x3, calm        Please refer above for cardiac ROS details.        Medical History  Surgical History Family History Social History   Past Medical History:   Diagnosis Date     Diabetes mellitus (H)      Hypertension      Past Surgical History:   Procedure Laterality Date     APPENDECTOMY       C REPLACE AORT VALV,PROSTH VALV      Description: Aortic Valve Replacement;  Recorded: 06/05/2013;     CARDIAC CATHETERIZATION       CV CORONARY ANGIOGRAM N/A 7/30/2021    Procedure: Coronary Angiogram;  Surgeon: Leonel Romero MD;  Location: [facility] CV     CV LEFT HEART CATH N/A 7/30/2021    Procedure: Left Heart Cath;  Surgeon: Leonel Romero MD;  Location: [facility] CV     CV RIGHT HEART CATH MEASUREMENTS RECORDED N/A 7/30/2021    Procedure: Right Heart Cath;  Surgeon: Leonel Romero MD;  Location: [facility] CV     CV SUPRAVALVULAR AORTOGRAM N/A 7/30/2021    Procedure: Supravalvular Aortagram;  Surgeon: Leonel Romero MD;  Location: [facility] CV     INSERT / REPLACE / REMOVE PACEMAKER       TOTAL SHOULDER REPLACEMENT Left      No family history on file.     Social History     Socioeconomic History     Marital status:      Spouse name: Not on file     Number of children: Not on file     Years of education: Not on file     Highest education level: Not on file   Occupational History     Not on file   Tobacco Use     Smoking status: Former Smoker     Types: Cigarettes     Smokeless tobacco: Never Used   Substance and Sexual Activity     Alcohol use: Never     Drug use: Never     Sexual activity: Not on file   Other Topics Concern     Not on file   Social History Narrative     Not on file     Social Determinants of Health     Financial Resource Strain:      Difficulty of Paying Living Expenses:    Food Insecurity:      Worried About Running Out of Food in the Last Year:      Ran Out of Food in the Last Year:     Transportation Needs:      Lack of Transportation (Medical):      Lack of Transportation (Non-Medical):    Physical Activity:      Days of Exercise per Week:      Minutes of Exercise per Session:    Stress:      Feeling of Stress :    Social Connections:      Frequency of Communication with Friends and Family:      Frequency of Social Gatherings with Friends and Family:      Attends Christianity Services:      Active Member of Clubs or Organizations:      Attends Club or Organization Meetings:      Marital Status:    Intimate Partner Violence:      Fear of Current or Ex-Partner:      Emotionally Abused:      Physically Abused:      Sexually Abused:          Medications  Allergies   Current Outpatient Medications   Medication Sig Dispense Refill     finasteride (PROSCAR) 5 mg tablet [FINASTERIDE (PROSCAR) 5 MG TABLET] 5 mg. Take one tablet by mouth daily.  5     furosemide (LASIX) 20 MG tablet [FUROSEMIDE (LASIX) 20 MG TABLET] Take 20 mg by mouth 2 (two) times a day. 40 mg qam and 20 mg in the evening per pt       glipiZIDE (GLUCOTROL) 10 MG 24 hr tablet [GLIPIZIDE (GLUCOTROL) 10 MG 24 HR TABLET] Take 10 mg by mouth daily.       lisinopril-hydrochlorothiazide (PRINZIDE,ZESTORETIC) 20-12.5 mg per tablet [LISINOPRIL-HYDROCHLOROTHIAZIDE (PRINZIDE,ZESTORETIC) 20-12.5 MG PER TABLET] Take 1 tablet by mouth daily.  0     metFORMIN (GLUCOPHAGE) 500 MG tablet [METFORMIN (GLUCOPHAGE) 500 MG TABLET] Take 500 mg by mouth 2 (two) times a day.       metoprolol succinate (TOPROL-XL) 100 MG 24 hr tablet [METOPROLOL SUCCINATE (TOPROL-XL) 100 MG 24 HR TABLET] Take 100 mg by mouth daily.       multivit,calc,mins/folic acid (ONE-A-DAY PROACTIVE 65 PLUS ORAL) [MULTIVIT,CALC,MINS/FOLIC ACID (ONE-A-DAY PROACTIVE 65 PLUS ORAL)] Take 1 tablet by mouth daily.       tamsulosin (FLOMAX) 0.4 mg Cp24 [TAMSULOSIN (FLOMAX) 0.4 MG CP24] 0.4 mg Daily after breakfast.   5     warfarin (COUMADIN) 5 MG tablet [WARFARIN (COUMADIN) 5 MG TABLET] Take 5mg  "daiy except on Tuesdays take 1/2 tab (2.5mg) - Take as directed         Allergies   Allergen Reactions     Atenolol Unknown     Buprenorphine Hcl [Buprenorphine] Other (See Comments)     pt. felt \"loopy\"     Codeine Unknown     Penicillins Unknown     Sulfa (Sulfonamide Antibiotics) [Sulfa Drugs] Unknown     Vancomycin Other (See Comments)     \"shuts down kidneys. Destroying venin\" per wife.          Lab Results    Chemistry/lipid CBC Cardiac Enzymes/BNP/TSH/INR   Recent Labs   Lab Test 07/30/21  0852   CHOL 128   HDL 28*   LDL 74   TRIG 129     Recent Labs   Lab Test 07/30/21  0852   LDL 74     Recent Labs   Lab Test 07/30/21  0852      POTASSIUM 4.2   CHLORIDE 106   CO2 27      BUN 31*   CR 1.09   GFRESTIMATED 66   MARK 10.3     Recent Labs   Lab Test 07/30/21  0852 07/14/21  1403 06/08/21  1546   CR 1.09 1.16 1.25     No results for input(s): A1C in the last 17102 hours.   Recent Labs   Lab Test 07/30/21  0852   WBC 6.1   HGB 12.3*   HCT 37.3*   MCV 95   *     Recent Labs   Lab Test 07/30/21  0852 06/08/21  1546 05/17/18  1733   HGB 12.3* 12.4* 14.0    No results for input(s): TROPONINI in the last 50802 hours.  Recent Labs   Lab Test 07/14/21  1403 06/08/21  1546   BNP  --  1,264*   NTBNP 7,991*  --      No results for input(s): TSH in the last 08804 hours.  Recent Labs   Lab Test 07/30/21  0851 07/07/21  1224 06/08/21  1546   INR 1.21* 2.14* 2.84*              Thank you for allowing me to participate in the care of your patient.      Sincerely,     Jacqueline Chilel MD     [facility]    "

## 2021-08-12 NOTE — NURSING NOTE
Valve Clinic - Mitral Regurgitation  (See consult notes from Dr. Chilel)    Referring provider: Dr. Campos    KCCQ12 (date completed 8/12/21), scanned into chart    Duran index (date completed 8/12/21): 6/6    frailty score: 0  Preliminary STS Score: 3% repair, 4.5% rplcmt      Pt is accompanied by wife Joe. Pt reports JOEL. Pt uses cane/walker PRN for gait stability.       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 Pt will contact dentist for dental clearance.     Reviewed MR education information with patient and wife. No further questions at this time. Patient has my direct contact information and was encouraged to call with questions or concerns.           Lenin Aguilera RN  [facility]  [facility]  Phone: [phone]  Fax: [phone]

## 2021-08-12 NOTE — PROGRESS NOTES
HEART CARE ENCOUNTER CONSULTATON NOTE      [facility]  [phone]      Assessment/Recommendations   Assessment/Plan:  Severe mitral regurgitation: This is the likely etiology for Mr. Weeks's recent declining functional capacity and congestive heart failure.  He will benefit from mitral valve repair, and would be a good candidate for MitraClip given his age and prior sternotomy.  The risks, benefits and alternatives of transcatheter mitral valve repair were reviewed, and he would like to proceed.  He will need dental clearance prior to that, which is a necessary requirement prior to valve surgery.    Paravalvular aortic insufficiency: As this is in the mild-to-moderate range, it has been present for a number of years, it is unlikely to be the primary etiology for his recent decline and increasing symptoms.  This can be followed clinically for now, and if he does continue to have symptoms after successful mitral valve repair, transcatheter closure of the paravalvular insufficiency could be considered.    Coronary artery disease: Agree with ongoing medical management, given the absence of angina and single-vessel disease.     History of Present Illness/Subjective    HPI: Sorin Weeks is a pleasant 75 year old male with a history of bicuspid aortic valve status post surgical aortic valve replacement, receiving a [employer]igi mechanical prosthesis in 1995.  He has been known to have a paravalvular leak around the aortic prosthesis for a number of years that has been followed clinically and with echocardiograms.  More recently, he had a decline in his functional capacity with increasing dyspnea, peripheral edema and congestive heart failure.    To evaluate his aortic insufficiency, he had a transesophageal echocardiogram on July 7, 2021 which showed 3 jets around the valve with likely moderate paravalvular aortic insufficiency in aggregate.  The JULI also however showed moderate to severe  "mitral regurgitation.  Left ventricular systolic function was low normal at 55%.  He subsequently had right left heart catheterization with coronary angiography that again showed mild to moderate aortic insufficiency.  There were prominent V waves on his pulmonary capillary wedge tracing with high pulmonary artery systolic pressures, consistent with severe mitral regurgitation.  His coronary angiogram showed a chronically occluded second obtuse marginal, with the distal vessel filling via collaterals.             Physical Examination  Review of Systems   Vitals: BP (!) 142/58 (BP Location: Left arm, Patient Position: Sitting, Cuff Size: Adult Large)   Pulse 60   Resp 14   Ht 1.626 m (5' 4\")   Wt 89.4 kg (197 lb)   BMI 33.81 kg/m    BMI= Body mass index is 33.81 kg/m .  Wt Readings from Last 3 Encounters:   08/12/21 89.4 kg (197 lb)   08/12/21 89.4 kg (197 lb)   07/30/21 85.7 kg (189 lb)       General Appearance:   no distress, normal body habitus, upright.   ENT/Mouth: membranes moist, no nasal discharge or bleeding gums.  Normal head shape, no evidence of injury or laceration.     EYES:  no scleral icterus, normal conjunctivae   Neck: no evidence of thyromegaly.  Supple   Chest/Lungs:   No audible wheezing equal chest wall expansion. Non labored breathing.  No cough.   Cardiovascular:   No evidence of elevated jugular venous pressure.  No evidence of pitting edema bilaterally    Abdomen:  no evidence of abdominal distention. No observe juandice.     Extremities: no cyanosis or clubbing noted.    Skin: no xanthelasma, normal skin color. No evidence of facial lacerations.      Neurologic: Normal arm motion bilateral, no tremors.  No evidence of focal defect.       Psychiatric: alert and oriented x3, calm        Please refer above for cardiac ROS details.        Medical History  Surgical History Family History Social History   Past Medical History:   Diagnosis Date     Diabetes mellitus (H)      Hypertension      " Past Surgical History:   Procedure Laterality Date     APPENDECTOMY       C REPLACE AORT VALV,PROSTH VALV      Description: Aortic Valve Replacement;  Recorded: 06/05/2013;     CARDIAC CATHETERIZATION       CV CORONARY ANGIOGRAM N/A 7/30/2021    Procedure: Coronary Angiogram;  Surgeon: Leonel Romero MD;  Location: [facility] CATH [facility] CV     CV LEFT HEART CATH N/A 7/30/2021    Procedure: Left Heart Cath;  Surgeon: Leonel Romero MD;  Location: [facility]     CV RIGHT HEART CATH MEASUREMENTS RECORDED N/A 7/30/2021    Procedure: Right Heart Cath;  Surgeon: Leonel Romero MD;  Location: [facility]     CV SUPRAVALVULAR AORTOGRAM N/A 7/30/2021    Procedure: Supravalvular Aortagram;  Surgeon: Leonel Romero MD;  Location: [facility] CV     INSERT / REPLACE / REMOVE PACEMAKER       TOTAL SHOULDER REPLACEMENT Left      No family history on file.     Social History     Socioeconomic History     Marital status:      Spouse name: Not on file     Number of children: Not on file     Years of education: Not on file     Highest education level: Not on file   Occupational History     Not on file   Tobacco Use     Smoking status: Former Smoker     Types: Cigarettes     Smokeless tobacco: Never Used   Substance and Sexual Activity     Alcohol use: Never     Drug use: Never     Sexual activity: Not on file   Other Topics Concern     Not on file   Social History Narrative     Not on file     Social Determinants of Health     Financial Resource Strain:      Difficulty of Paying Living Expenses:    Food Insecurity:      Worried About Running Out of Food in the Last Year:      Ran Out of Food in the Last Year:    Transportation Needs:      Lack of Transportation (Medical):      Lack of Transportation (Non-Medical):    Physical Activity:      Days of Exercise per Week:      Minutes of Exercise per Session:    Stress:      Feeling of Stress :    Social Connections:      Frequency of  "Communication with Friends and Family:      Frequency of Social Gatherings with Friends and Family:      Attends Anabaptist Services:      Active Member of Clubs or Organizations:      Attends Club or Organization Meetings:      Marital Status:    Intimate Partner Violence:      Fear of Current or Ex-Partner:      Emotionally Abused:      Physically Abused:      Sexually Abused:            Medications  Allergies   Current Outpatient Medications   Medication Sig Dispense Refill     finasteride (PROSCAR) 5 mg tablet [FINASTERIDE (PROSCAR) 5 MG TABLET] 5 mg. Take one tablet by mouth daily.  5     furosemide (LASIX) 20 MG tablet [FUROSEMIDE (LASIX) 20 MG TABLET] Take 20 mg by mouth 2 (two) times a day. 40 mg qam and 20 mg in the evening per pt       glipiZIDE (GLUCOTROL) 10 MG 24 hr tablet [GLIPIZIDE (GLUCOTROL) 10 MG 24 HR TABLET] Take 10 mg by mouth daily.       lisinopril-hydrochlorothiazide (PRINZIDE,ZESTORETIC) 20-12.5 mg per tablet [LISINOPRIL-HYDROCHLOROTHIAZIDE (PRINZIDE,ZESTORETIC) 20-12.5 MG PER TABLET] Take 1 tablet by mouth daily.  0     metFORMIN (GLUCOPHAGE) 500 MG tablet [METFORMIN (GLUCOPHAGE) 500 MG TABLET] Take 500 mg by mouth 2 (two) times a day.       metoprolol succinate (TOPROL-XL) 100 MG 24 hr tablet [METOPROLOL SUCCINATE (TOPROL-XL) 100 MG 24 HR TABLET] Take 100 mg by mouth daily.       multivit,calc,mins/folic acid (ONE-A-DAY PROACTIVE 65 PLUS ORAL) [MULTIVIT,CALC,MINS/FOLIC ACID (ONE-A-DAY PROACTIVE 65 PLUS ORAL)] Take 1 tablet by mouth daily.       tamsulosin (FLOMAX) 0.4 mg Cp24 [TAMSULOSIN (FLOMAX) 0.4 MG CP24] 0.4 mg Daily after breakfast.   5     warfarin (COUMADIN) 5 MG tablet [WARFARIN (COUMADIN) 5 MG TABLET] Take 5mg daiy except on Tuesdays take 1/2 tab (2.5mg) - Take as directed         Allergies   Allergen Reactions     Atenolol Unknown     Buprenorphine Hcl [Buprenorphine] Other (See Comments)     pt. felt \"loopy\"     Codeine Unknown     Penicillins Unknown     Sulfa (Sulfonamide " "Antibiotics) [Sulfa Drugs] Unknown     Vancomycin Other (See Comments)     \"shuts down kidneys. Destroying venin\" per wife.          Lab Results    Chemistry/lipid CBC Cardiac Enzymes/BNP/TSH/INR   Recent Labs   Lab Test 07/30/21  0852   CHOL 128   HDL 28*   LDL 74   TRIG 129     Recent Labs   Lab Test 07/30/21  0852   LDL 74     Recent Labs   Lab Test 07/30/21  0852      POTASSIUM 4.2   CHLORIDE 106   CO2 27      BUN 31*   CR 1.09   GFRESTIMATED 66   MARK 10.3     Recent Labs   Lab Test 07/30/21  0852 07/14/21  1403 06/08/21  1546   CR 1.09 1.16 1.25     No results for input(s): A1C in the last 77230 hours.       Recent Labs   Lab Test 07/30/21  0852   WBC 6.1   HGB 12.3*   HCT 37.3*   MCV 95   *     Recent Labs   Lab Test 07/30/21  0852 06/08/21  1546 05/17/18  1733   HGB 12.3* 12.4* 14.0    No results for input(s): TROPONINI in the last 90305 hours.  Recent Labs   Lab Test 07/14/21  1403 06/08/21  1546   BNP  --  1,264*   NTBNP 7,991*  --      No results for input(s): TSH in the last 83992 hours.  Recent Labs   Lab Test 07/30/21  0851 07/07/21  1224 06/08/21  1546   INR 1.21* 2.14* 2.84*        Jacqueline Chilel MD                                      "

## 2021-08-18 ENCOUNTER — OFFICE VISIT (OUTPATIENT)
Dept: CARDIOLOGY | Facility: CLINIC | Age: 76
End: 2021-08-18
Payer: MEDICARE

## 2021-08-18 VITALS
RESPIRATION RATE: 16 BRPM | HEIGHT: 65 IN | WEIGHT: 199 LBS | BODY MASS INDEX: 33.15 KG/M2 | SYSTOLIC BLOOD PRESSURE: 126 MMHG | DIASTOLIC BLOOD PRESSURE: 56 MMHG | HEART RATE: 80 BPM

## 2021-08-18 DIAGNOSIS — I34.0 NONRHEUMATIC MITRAL VALVE REGURGITATION: ICD-10-CM

## 2021-08-18 DIAGNOSIS — I35.1 AORTIC VALVE INSUFFICIENCY, ETIOLOGY OF CARDIAC VALVE DISEASE UNSPECIFIED: ICD-10-CM

## 2021-08-18 DIAGNOSIS — Z95.2 S/P AVR (AORTIC VALVE REPLACEMENT): Primary | ICD-10-CM

## 2021-08-18 PROCEDURE — 99203 OFFICE O/P NEW LOW 30 MIN: CPT | Performed by: THORACIC SURGERY (CARDIOTHORACIC VASCULAR SURGERY)

## 2021-08-18 ASSESSMENT — MIFFLIN-ST. JEOR: SCORE: 1564.54

## 2021-08-18 NOTE — CONSULTS
Consult Date: 08/18/2021    I was asked to evaluate this patient for open surgical mitral valve repair/replacement versus MitraClip by Dr. Jacqueline Chilel.    HISTORY OF PRESENT ILLNESS:  Mr. Weeks is a 75-year-old gentleman who has been experiencing a decline in his functional capacity and has experienced heart failure.  He appears to have severe mitral regurgitation.  He also has a history of a bicuspid aortic valve and underwent surgical aortic valve replacement with a [employer] Luigi mechanical prosthesis in 1996.  He has had a known perivalvular leak around the aortic prosthesis for a number of years.  More recently, he has experienced increasing dyspnea on exertion, peripheral edema and congestive heart failure.  He underwent a transesophageal echo in July of this year, which showed 3 jets around the aortic valve with likely moderate perivalvular aortic insufficiency.  The JULI also revealed moderate to severe mitral regurgitation.  His left ventricular systolic function was low normal at 55%.  A right heart catheterization was done that showed mild to moderate aortic insufficiency, but there were prominent V-waves as on his pulmonary capillary wedge tracing with high pulmonary artery systolic pressures consistent with severe mitral regurgitation.  His coronary angiogram also showed a chronically occluded obtuse marginal 2.    PAST SURGICAL AND MEDICAL HISTORY:  SURGICAL PROCEDURES:  Aortic valve replacement in 1996.    MEDICAL PROBLEMS:  1.  Aortic insufficiency.  2.  Status post mechanical aortic valve replacement in 1996.  3.  Severe mitral regurgitation.  4.  Single vessel coronary artery disease.    ALLERGIES:  ATENOLOL, UNKNOWN, BUPRENORPHINE, FELT LOOPY.  CODEINE, UNKNOWN PENICILLINS UNKNOWN.  SULFA, UNKNOWN.  VANCOMYCIN DESTROYS HIS VEINS AND SHUT DOWN HIS KIDNEYS.    MEDICATIONS:  See chart.    FAMILY HISTORY:  Noncontributory.    SOCIAL HISTORY:  He is .  His wife is very aware of his medical  problems.  He does not smoke or ingest alcohol.    REVIEW OF SYSTEMS:  A 12-organ system review was done and is negative except for the above-stated.    PHYSICAL EXAMINATION:    GENERAL:  A stocky, elderly gentleman in no acute distress.  Height is 5 feet 5 inches, weight is 90.3 kilograms.  VITAL SIGNS:  Temperature afebrile, respiratory rate 14, heart rate 60, blood pressure 142/58.  SKIN:  Scattered senile changes.  LYMPH NODES:  No palpable lymphadenopathy.  HEENT:  Normocephalic.  PERRL.  EOMs intact.  ENT unremarkable.  NECK:  Supple, without carotid bruits.  CHEST:  Without deformity.  LUNGS:  Clear to auscultation.  HEART:  Regular rate and rhythm without murmur, gallops, or rubs.  Pulses 2+ and symmetrical.  ABDOMEN:  Obese, nontender, without palpable organomegaly.  Normoactive bowel sounds.   RECTAL:  Deferred.  NEUROLOGIC:  Grossly intact.  BACK:  Without deformity.  EXTREMITIES:  Full range of motion without clubbing, cyanosis, edema.    IMPRESSION:  I believe this gentleman's heart failure is clearly related to his severe mitral regurgitation and I believe he would benefit from a MitraClip.  Given the fact that he is 75 years old and has had previous heart surgery, I think this would be a good first start for him. If it were necessary, we certainly could do a redo sternotomy, replace his aortic valve, and repair or replace his mitral valve.  He is aware that the risks for MitraClip include: bleeding, infection, inability to place the MitraClip, very rarely emergency surgery and a negligible operative mortality.  He accepts these risks and hopes that the MitraClip will make his heart failure much better.  I agree with this plan for him.    Thank you very much for this referral.    Sarah Borden MD        D: 2021   T: 2021   DOUGIE jordan    Name:     YISEL SWEENEY  MRN:      [id number]        Account:      [id number]   :      1945           Consult Date: 2021      Document: [id number]

## 2021-08-18 NOTE — LETTER
8/18/2021    [facility] [address] [place] [address]    RE: Sorin Weeks       Dear Colleague,    I had the pleasure of seeing Sorin Weeks in the [facility].    Patient was seen and examined by me.  Full consult note dictated.      Thank you for allowing me to participate in the care of your patient.      Sincerely,     Sarah Borden MD     [facility]  cc:   No referring provider defined for this encounter.

## 2021-08-20 ENCOUNTER — TELEPHONE (OUTPATIENT)
Dept: CARDIOLOGY | Facility: CLINIC | Age: 76
End: 2021-08-20

## 2021-08-20 DIAGNOSIS — Z95.2 H/O MECHANICAL AORTIC VALVE REPLACEMENT: Primary | ICD-10-CM

## 2021-08-20 DIAGNOSIS — I34.0 SEVERE MITRAL REGURGITATION: ICD-10-CM

## 2021-08-20 RX ORDER — CLINDAMYCIN HCL 300 MG
600 CAPSULE ORAL ONCE
Qty: 2 CAPSULE | Refills: 0 | Status: SHIPPED | OUTPATIENT
Start: 2021-08-20 | End: 2021-08-20

## 2021-08-20 NOTE — TELEPHONE ENCOUNTER
Patient is being evaluated for transcatheter mitral valve repair with MitraClip. Pending dental clearance. Pt will need 7 teeth extracted.    Discussed with Dr Romero, as Dr Chilel is out of the office. Ok to proceed with dental extractions/oral surgery from cardiac perspective. Pt's INR is managed by PCP Dr Quevedo. Will defer INR and warfarin management back to PCP.    I called and spoke with [facility] [phone]. They will contact Dr. Quevedo's office regarding INR and warfarin hold as needed.     Rx for SBE prophylaxis sent to [facility] in [place]. Noted PCN allergy.    I will also email a letter to wife stating medical necessity of dental clearance prior to tMVR with MitraClip. They are in the process of seeking medicare coverage for his dental work.     Pt will call me with a date for his dental extractions.      Lenin Aguilera RN, Structural Heart Coordinator  [facility]  Valve Clinic [phone]  Fax: [phone]

## 2021-08-29 ENCOUNTER — HEALTH MAINTENANCE LETTER (OUTPATIENT)
Age: 76
End: 2021-08-29

## 2021-09-02 ENCOUNTER — TELEPHONE (OUTPATIENT)
Dept: CARDIOLOGY | Facility: CLINIC | Age: 76
End: 2021-09-02

## 2021-09-02 NOTE — TELEPHONE ENCOUNTER
----- Message -----  From: Erica Leggett  Sent: 9/2/2021   9:33 AM CDT  To: Lenin Aguilera RN    His consult did not go well on 08/30 and he has a meeting with [facility] on 09/02 in the evening.    I also mentioned 10/4 clip and he will be out of town for a wedding at that time.    He is going to call us with an update on his dental consult.     Thanks!  Erica

## 2021-09-10 ENCOUNTER — ANCILLARY PROCEDURE (OUTPATIENT)
Dept: CARDIOLOGY | Facility: CLINIC | Age: 76
End: 2021-09-10
Attending: INTERNAL MEDICINE
Payer: MEDICARE

## 2021-09-10 DIAGNOSIS — Z95.0 CARDIAC PACEMAKER IN SITU: ICD-10-CM

## 2021-09-10 PROCEDURE — 93294 REM INTERROG EVL PM/LDLS PM: CPT | Performed by: INTERNAL MEDICINE

## 2021-09-10 PROCEDURE — 93296 REM INTERROG EVL PM/IDS: CPT | Performed by: INTERNAL MEDICINE

## 2021-09-16 ENCOUNTER — OFFICE VISIT (OUTPATIENT)
Dept: CARDIOLOGY | Facility: CLINIC | Age: 76
End: 2021-09-16
Payer: MEDICARE

## 2021-09-16 ENCOUNTER — TELEPHONE (OUTPATIENT)
Dept: CARDIOLOGY | Facility: CLINIC | Age: 76
End: 2021-09-16

## 2021-09-16 VITALS
WEIGHT: 201 LBS | DIASTOLIC BLOOD PRESSURE: 60 MMHG | BODY MASS INDEX: 33.45 KG/M2 | SYSTOLIC BLOOD PRESSURE: 120 MMHG | HEART RATE: 56 BPM | RESPIRATION RATE: 16 BRPM

## 2021-09-16 DIAGNOSIS — I34.0 NONRHEUMATIC MITRAL VALVE REGURGITATION: ICD-10-CM

## 2021-09-16 DIAGNOSIS — I48.21 PERMANENT ATRIAL FIBRILLATION (H): ICD-10-CM

## 2021-09-16 DIAGNOSIS — Z95.2 S/P AVR (AORTIC VALVE REPLACEMENT): ICD-10-CM

## 2021-09-16 DIAGNOSIS — I34.0 SEVERE MITRAL REGURGITATION: Primary | ICD-10-CM

## 2021-09-16 DIAGNOSIS — I35.1 NONRHEUMATIC AORTIC VALVE INSUFFICIENCY: Primary | ICD-10-CM

## 2021-09-16 PROCEDURE — 99214 OFFICE O/P EST MOD 30 MIN: CPT | Performed by: INTERNAL MEDICINE

## 2021-09-16 NOTE — TELEPHONE ENCOUNTER
Dr Chilel,    Update for you. We discussed this patient last week after he met with Dr. Campos. 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 Pt saw a third dentist and the recommendations are consistent. He will require all teeth to be extracted and the patient does not have dental insurance and does not have $12k to spend on dental work. As we discussed, I did review the risks of endocarditis and the patient understands the risks and would like to forgo dental clearance and proceed with MitraClip. Pt continues to report a decline in functional capacity with increasing dyspnea and peripheral edema.    Do you have additional recommendations? Will plan for MitraClip on Oct 4.     Thanks,    Lenin Aguilera RN, Structural Heart Coordinator  [facility]  Valve Clinic [phone]  Fax: [phone]    CC: Dr Campos      -------------------------------------------------------------------------------------------------------------------------    See visit with Dr. Campos. He referred patient to a dentist and they have an appt for Tuesday 9/21. Per Dr Chilel, will wait to see what the dentist recommends with a low threshold to proceed with mitraclip in setting of severe and symptomatic MR with h/o AVR without h/o endocarditis.     I will call the patient next week after his dental appt. If dental recommendation continues to be extensive and will cause further delay in treating his mitral valve, we will have a discussion with the patient regarding risks of forgoing dental clearance.    Tentative date for MitraClip Oct 4    Lenin Aguilera RN, Structural Heart Coordinator  [facility]  Valve Clinic [phone]  Fax: [phone]  9/16/21 3:56PM    ----- Message ----------------------------------------------------------------------------------------------------------  From: Jacqueline Chilel MD  Sent: 9/16/2021   1:12  PM CDT  To: Priti Santiago, RN, Jhony Campos MD, *    Hi Jhony,    I can understand the dental clearance can be frustrating, and has been an issue for some other patients.  It is a standard part of the workflow for both transcatheter and surgical valves. The one thing in this instance that is different is that he has an existing aortic mechanical valve without history of endocarditis which is encouraging.  Let me review his chart again, and we will get in touch with him with some options.    Thanks for letting me know.    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  He was evaluated in the valve clinic and decision was made for mitral clip.  He is comfortable with that but was asked to get a dental evaluation prior.  He is very frustrated as first dentist 1 to remove all of his teeth in place dentures for a total cost of almost $12,000.  They are not in a position for that and they try to get a oral surgeon opinion which did not work out and a second dental opinion and they wanted to clean his teeth for several 100 hours before they would even do a consult.    I did give him the name of the dentist that I think might be helpful but also wondering what the requirements for dental work would be with the mitral clip as opposed follow-up prosthetic valve?  His teeth are clearly not in tip top shape and he has had this situation ongoing with the prosthetic valve for many years.    Would love to hear your thoughts or any advice you might have.    Thanks,    Jhony

## 2021-09-16 NOTE — LETTER
9/16/2021    [facility] [place] [address] [place] [address]    RE: Sorin Khouryson       Dear Colleague,    I had the pleasure of seeing Sorin Weeks in the [facility].            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  He developed congestive heart failure and recent transesophageal echocardiogram showed severe mitral insufficiency.  He has been evaluated in the valve clinic and tentative plans for mitral clip procedure.  He does however need to have his teeth evaluated and there is been a major frustration with this.  The first dentist recommended pulling all of his teeth and putting dentures in which would be $12,000 and they just are not very interested in that.  They have not been able to get a second opinion as of yet but I gave them the name of a dentist that might be helpful.    I will also talk with the structural team to see what their feelings are in regard to the dental evaluation in the setting of a mitral clip versus mitral replacement.  We will get back to the patient after that.    He does not have evidence of active heart failure now with clear lung fields.  He does have mild hepatojugular reflux and mild peripheral edema.  I have not change any of his medications today.    30 minutes spent with chart review, patient visit, and documentation     History of Present Illness/Subjective    Mr. 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  The paravalvular leak is moderate and with recent presentation of congestive heart failure he was noted to have severe mitral insufficiency on transesophageal echo.  He was evaluated  in the valve clinic both by cardiac surgery as well as our structural cardiologist.  It was felt to put potentially benefit from a mitral clip procedure and dental evaluation is recommended prior to that.  He has been frustrated with the dental work-up in part because of the significant cost of pulling on his teeth and placing dentures.  He is not sure he would go ahead with the mitral clip if he had spent $12,000 for dental work first.    He has not had orthopnea, paroxysmal nocturnal dyspnea and his peripheral edema is been mild.  His breathing is much improved after diuresis.           Physical Examination Review of Systems   /60 (BP Location: Left arm, Patient Position: Sitting, Cuff Size: Adult Large)   Pulse 56   Resp 16   Wt 91.2 kg (201 lb)   BMI 33.45 kg/m    Body mass index is 33.45 kg/m .  Wt Readings from Last 3 Encounters:   09/16/21 91.2 kg (201 lb)   08/18/21 90.3 kg (199 lb)   08/12/21 89.4 kg (197 lb)     General Appearance:   Alert, cooperative and in no acute distress.   ENT/Mouth: Patient wearing a mask.      EYES:  no scleral icterus, normal conjunctivae   Neck: JVP .  Positive hepatojugular reflux. Thyroid not visualized.   Chest/Lungs:   Lungs are clear to auscultation, equal chest wall expansion.   Cardiovascular:   S1, metallic S2 with 3/6 systolic murmur , no clicks or rubs. Brachial, radial pulses are intact and symetric. No carotid bruits noted   Abdomen:  Nontender. BS+.    Extremities: No cyanosis, clubbing or edema   Skin: no xanthelasma, warm.    Neurologic: normal arm movement bilateral, no tremors     Psychiatric: Appropriate affect.      Enc Vitals  BP: 120/60  Pulse: 56  Resp: 16  Weight: 91.2 kg (201 lb)                                           Medical History  Surgical History Family History Social History   Past Medical History:   Diagnosis Date     Diabetes mellitus (H)      Hypertension     Past Surgical History:   Procedure Laterality Date     APPENDECTOMY       C  REPLACE AORT VALV,PROSTH VALV      Description: Aortic Valve Replacement;  Recorded: 06/05/2013;     CARDIAC CATHETERIZATION       CV CORONARY ANGIOGRAM N/A 7/30/2021    Procedure: Coronary Angiogram;  Surgeon: Leonel Romero MD;  Location: [facility] CATH LAB CV     CV LEFT HEART CATH N/A 7/30/2021    Procedure: Left Heart Cath;  Surgeon: Leonel Romero MD;  Location: [facility] CATH LAB CV     CV RIGHT HEART CATH MEASUREMENTS RECORDED N/A 7/30/2021    Procedure: Right Heart Cath;  Surgeon: Leonel Romero MD;  Location: [facility] CATH LAB CV     CV SUPRAVALVULAR AORTOGRAM N/A 7/30/2021    Procedure: Supravalvular Aortagram;  Surgeon: Leonel Romero MD;  Location: [facility] CATH LAB CV     INSERT / REPLACE / REMOVE PACEMAKER       TOTAL SHOULDER REPLACEMENT Left     No family history on file. Social History     Socioeconomic History     Marital status:      Spouse name: Not on file     Number of children: Not on file     Years of education: Not on file     Highest education level: Not on file   Occupational History     Not on file   Tobacco Use     Smoking status: Former Smoker     Types: Cigarettes     Smokeless tobacco: Never Used   Substance and Sexual Activity     Alcohol use: Never     Drug use: Never     Sexual activity: Not on file   Other Topics Concern     Not on file   Social History Narrative     Not on file     Social Determinants of Health     Financial Resource Strain:      Difficulty of Paying Living Expenses:    Food Insecurity:      Worried About Running Out of Food in the Last Year:      Ran Out of Food in the Last Year:    Transportation Needs:      Lack of Transportation (Medical):      Lack of Transportation (Non-Medical):    Physical Activity:      Days of Exercise per Week:      Minutes of Exercise per Session:    Stress:      Feeling of Stress :    Social Connections:      Frequency of Communication with Friends and Family:      Frequency of Social Gatherings with Friends and  "Family:      Attends Baptism Services:      Active Member of Clubs or Organizations:      Attends Club or Organization Meetings:      Marital Status:    Intimate Partner Violence:      Fear of Current or Ex-Partner:      Emotionally Abused:      Physically Abused:      Sexually Abused:           Medications  Allergies   Current Outpatient Medications   Medication Sig Dispense Refill     finasteride (PROSCAR) 5 mg tablet [FINASTERIDE (PROSCAR) 5 MG TABLET] 5 mg. Take one tablet by mouth daily.  5     furosemide (LASIX) 20 MG tablet [FUROSEMIDE (LASIX) 20 MG TABLET] Take 20 mg by mouth 2 (two) times a day. 40 mg qam and 20 mg in the evening per pt       glipiZIDE (GLUCOTROL) 10 MG 24 hr tablet [GLIPIZIDE (GLUCOTROL) 10 MG 24 HR TABLET] Take 10 mg by mouth daily.       lisinopril-hydrochlorothiazide (PRINZIDE,ZESTORETIC) 20-12.5 mg per tablet [LISINOPRIL-HYDROCHLOROTHIAZIDE (PRINZIDE,ZESTORETIC) 20-12.5 MG PER TABLET] Take 1 tablet by mouth daily.  0     metFORMIN (GLUCOPHAGE) 500 MG tablet [METFORMIN (GLUCOPHAGE) 500 MG TABLET] Take 500 mg by mouth 2 (two) times a day.       metoprolol succinate (TOPROL-XL) 100 MG 24 hr tablet [METOPROLOL SUCCINATE (TOPROL-XL) 100 MG 24 HR TABLET] Take 100 mg by mouth daily.       multivit,calc,mins/folic acid (ONE-A-DAY PROACTIVE 65 PLUS ORAL) [MULTIVIT,CALC,MINS/FOLIC ACID (ONE-A-DAY PROACTIVE 65 PLUS ORAL)] Take 1 tablet by mouth daily.       tamsulosin (FLOMAX) 0.4 mg Cp24 [TAMSULOSIN (FLOMAX) 0.4 MG CP24] 0.4 mg Daily after breakfast.   5     warfarin (COUMADIN) 5 MG tablet [WARFARIN (COUMADIN) 5 MG TABLET] Take 5mg daiy except on Tuesdays take 1/2 tab (2.5mg) - Take as directed      Allergies   Allergen Reactions     Atenolol Unknown     Buprenorphine Hcl [Buprenorphine] Other (See Comments)     pt. felt \"loopy\"     Codeine Unknown     Penicillins Unknown     Sulfa (Sulfonamide Antibiotics) [Sulfa Drugs] Unknown     Vancomycin Other (See Comments)     \"shuts down kidneys. " "Destroying venin\" per wife.         Lab Results    Chemistry/lipid CBC Cardiac Enzymes/BNP/TSH/INR   Lab Results   Component Value Date    CHOL 128 07/30/2021    HDL 28 (L) 07/30/2021    TRIG 129 07/30/2021    BUN 31 (H) 07/30/2021     07/30/2021    CO2 27 07/30/2021    Lab Results   Component Value Date    WBC 6.1 07/30/2021    HGB 12.3 (L) 07/30/2021    HCT 37.3 (L) 07/30/2021    MCV 95 07/30/2021     (L) 07/30/2021    Lab Results   Component Value Date    BNP 1,264 (H) 06/08/2021    INR 1.21 (H) 07/30/2021                                               Thank you for allowing me to participate in the care of your patient.      Sincerely,     Jhony Campos MD     [facility]  cc:   No referring provider defined for this encounter.        "

## 2021-09-16 NOTE — PROGRESS NOTES
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  He developed congestive heart failure and recent transesophageal echocardiogram showed severe mitral insufficiency.  He has been evaluated in the valve clinic and tentative plans for mitral clip procedure.  He does however need to have his teeth evaluated and there is been a major frustration with this.  The first dentist recommended pulling all of his teeth and putting dentures in which would be $12,000 and they just are not very interested in that.  They have not been able to get a second opinion as of yet but I gave them the name of a dentist that might be helpful.    I will also talk with the structural team to see what their feelings are in regard to the dental evaluation in the setting of a mitral clip versus mitral replacement.  We will get back to the patient after that.    He does not have evidence of active heart failure now with clear lung fields.  He does have mild hepatojugular reflux and mild peripheral edema.  I have not change any of his medications today.    30 minutes spent with chart review, patient visit, and documentation     History of Present Illness/Subjective    Mr. 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  The paravalvular leak is moderate and with recent presentation of congestive heart failure he was noted to have severe mitral insufficiency on transesophageal echo.  He was evaluated in the valve clinic both by cardiac surgery as well as our structural cardiologist.  It was felt to put potentially benefit from a mitral clip procedure and dental evaluation is recommended prior to that.  He has been frustrated with the dental work-up in part because of the  significant cost of pulling on his teeth and placing dentures.  He is not sure he would go ahead with the mitral clip if he had spent $12,000 for dental work first.    He has not had orthopnea, paroxysmal nocturnal dyspnea and his peripheral edema is been mild.  His breathing is much improved after diuresis.           Physical Examination Review of Systems   /60 (BP Location: Left arm, Patient Position: Sitting, Cuff Size: Adult Large)   Pulse 56   Resp 16   Wt 91.2 kg (201 lb)   BMI 33.45 kg/m    Body mass index is 33.45 kg/m .  Wt Readings from Last 3 Encounters:   09/16/21 91.2 kg (201 lb)   08/18/21 90.3 kg (199 lb)   08/12/21 89.4 kg (197 lb)     General Appearance:   Alert, cooperative and in no acute distress.   ENT/Mouth: Patient wearing a mask.      EYES:  no scleral icterus, normal conjunctivae   Neck: JVP .  Positive hepatojugular reflux. Thyroid not visualized.   Chest/Lungs:   Lungs are clear to auscultation, equal chest wall expansion.   Cardiovascular:   S1, metallic S2 with 3/6 systolic murmur , no clicks or rubs. Brachial, radial pulses are intact and symetric. No carotid bruits noted   Abdomen:  Nontender. BS+.    Extremities: No cyanosis, clubbing or edema   Skin: no xanthelasma, warm.    Neurologic: normal arm movement bilateral, no tremors     Psychiatric: Appropriate affect.      Enc Vitals  BP: 120/60  Pulse: 56  Resp: 16  Weight: 91.2 kg (201 lb)                                           Medical History  Surgical History Family History Social History   Past Medical History:   Diagnosis Date     Diabetes mellitus (H)      Hypertension     Past Surgical History:   Procedure Laterality Date     APPENDECTOMY       C REPLACE AORT VALV,PROSTH VALV      Description: Aortic Valve Replacement;  Recorded: 06/05/2013;     CARDIAC CATHETERIZATION       CV CORONARY ANGIOGRAM N/A 7/30/2021    Procedure: Coronary Angiogram;  Surgeon: Leonel Romero MD;  [facility] CATH LAB CV     CV  LEFT HEART CATH N/A 7/30/2021    Procedure: Left Heart Cath;  Surgeon: Leonel Romero MD;  Location: [facility] CATH LAB CV     CV RIGHT HEART CATH MEASUREMENTS RECORDED N/A 7/30/2021    Procedure: Right Heart Cath;  Surgeon: Leonel Romero MD;  Location: [facility] CATH LAB CV     CV SUPRAVALVULAR AORTOGRAM N/A 7/30/2021    Procedure: Supravalvular Aortagram;  Surgeon: Leonel Romero MD;  Location: [facility] CATH LAB CV     INSERT / REPLACE / REMOVE PACEMAKER       TOTAL SHOULDER REPLACEMENT Left     No family history on file. Social History     Socioeconomic History     Marital status:      Spouse name: Not on file     Number of children: Not on file     Years of education: Not on file     Highest education level: Not on file   Occupational History     Not on file   Tobacco Use     Smoking status: Former Smoker     Types: Cigarettes     Smokeless tobacco: Never Used   Substance and Sexual Activity     Alcohol use: Never     Drug use: Never     Sexual activity: Not on file   Other Topics Concern     Not on file   Social History Narrative     Not on file     Social Determinants of Health     Financial Resource Strain:      Difficulty of Paying Living Expenses:    Food Insecurity:      Worried About Running Out of Food in the Last Year:      Ran Out of Food in the Last Year:    Transportation Needs:      Lack of Transportation (Medical):      Lack of Transportation (Non-Medical):    Physical Activity:      Days of Exercise per Week:      Minutes of Exercise per Session:    Stress:      Feeling of Stress :    Social Connections:      Frequency of Communication with Friends and Family:      Frequency of Social Gatherings with Friends and Family:      Attends Yarsani Services:      Active Member of Clubs or Organizations:      Attends Club or Organization Meetings:      Marital Status:    Intimate Partner Violence:      Fear of Current or Ex-Partner:      Emotionally Abused:      Physically Abused:       "Sexually Abused:           Medications  Allergies   Current Outpatient Medications   Medication Sig Dispense Refill     finasteride (PROSCAR) 5 mg tablet [FINASTERIDE (PROSCAR) 5 MG TABLET] 5 mg. Take one tablet by mouth daily.  5     furosemide (LASIX) 20 MG tablet [FUROSEMIDE (LASIX) 20 MG TABLET] Take 20 mg by mouth 2 (two) times a day. 40 mg qam and 20 mg in the evening per pt       glipiZIDE (GLUCOTROL) 10 MG 24 hr tablet [GLIPIZIDE (GLUCOTROL) 10 MG 24 HR TABLET] Take 10 mg by mouth daily.       lisinopril-hydrochlorothiazide (PRINZIDE,ZESTORETIC) 20-12.5 mg per tablet [LISINOPRIL-HYDROCHLOROTHIAZIDE (PRINZIDE,ZESTORETIC) 20-12.5 MG PER TABLET] Take 1 tablet by mouth daily.  0     metFORMIN (GLUCOPHAGE) 500 MG tablet [METFORMIN (GLUCOPHAGE) 500 MG TABLET] Take 500 mg by mouth 2 (two) times a day.       metoprolol succinate (TOPROL-XL) 100 MG 24 hr tablet [METOPROLOL SUCCINATE (TOPROL-XL) 100 MG 24 HR TABLET] Take 100 mg by mouth daily.       multivit,calc,mins/folic acid (ONE-A-DAY PROACTIVE 65 PLUS ORAL) [MULTIVIT,CALC,MINS/FOLIC ACID (ONE-A-DAY PROACTIVE 65 PLUS ORAL)] Take 1 tablet by mouth daily.       tamsulosin (FLOMAX) 0.4 mg Cp24 [TAMSULOSIN (FLOMAX) 0.4 MG CP24] 0.4 mg Daily after breakfast.   5     warfarin (COUMADIN) 5 MG tablet [WARFARIN (COUMADIN) 5 MG TABLET] Take 5mg daiy except on Tuesdays take 1/2 tab (2.5mg) - Take as directed      Allergies   Allergen Reactions     Atenolol Unknown     Buprenorphine Hcl [Buprenorphine] Other (See Comments)     pt. felt \"loopy\"     Codeine Unknown     Penicillins Unknown     Sulfa (Sulfonamide Antibiotics) [Sulfa Drugs] Unknown     Vancomycin Other (See Comments)     \"shuts down kidneys. Destroying venin\" per wife.         Lab Results    Chemistry/lipid CBC Cardiac Enzymes/BNP/TSH/INR   Lab Results   Component Value Date    CHOL 128 07/30/2021    HDL 28 (L) 07/30/2021    TRIG 129 07/30/2021    BUN 31 (H) 07/30/2021     07/30/2021    CO2 27 07/30/2021    " Lab Results   Component Value Date    WBC 6.1 07/30/2021    HGB 12.3 (L) 07/30/2021    HCT 37.3 (L) 07/30/2021    MCV 95 07/30/2021     (L) 07/30/2021    Lab Results   Component Value Date    BNP 1,264 (H) 06/08/2021    INR 1.21 (H) 07/30/2021

## 2021-09-20 LAB
MDC_IDC_LEAD_IMPLANT_DT: NORMAL
MDC_IDC_LEAD_LOCATION: NORMAL
MDC_IDC_LEAD_LOCATION_DETAIL_1: NORMAL
MDC_IDC_LEAD_MFG: NORMAL
MDC_IDC_LEAD_MODEL: NORMAL
MDC_IDC_LEAD_POLARITY_TYPE: NORMAL
MDC_IDC_LEAD_SERIAL: NORMAL
MDC_IDC_MSMT_BATTERY_DTM: NORMAL
MDC_IDC_MSMT_BATTERY_REMAINING_LONGEVITY: 77 MO
MDC_IDC_MSMT_BATTERY_REMAINING_PERCENTAGE: 57 %
MDC_IDC_MSMT_BATTERY_RRT_TRIGGER: NORMAL
MDC_IDC_MSMT_BATTERY_STATUS: NORMAL
MDC_IDC_MSMT_BATTERY_VOLTAGE: 2.89 V
MDC_IDC_MSMT_LEADCHNL_RV_IMPEDANCE_VALUE: 450 OHM
MDC_IDC_MSMT_LEADCHNL_RV_LEAD_CHANNEL_STATUS: NORMAL
MDC_IDC_MSMT_LEADCHNL_RV_PACING_THRESHOLD_AMPLITUDE: 0.88 V
MDC_IDC_MSMT_LEADCHNL_RV_PACING_THRESHOLD_PULSEWIDTH: 0.6 MS
MDC_IDC_MSMT_LEADCHNL_RV_SENSING_INTR_AMPL: 10.1 MV
MDC_IDC_PG_IMPLANT_DTM: NORMAL
MDC_IDC_PG_MFG: NORMAL
MDC_IDC_PG_MODEL: NORMAL
MDC_IDC_PG_SERIAL: NORMAL
MDC_IDC_PG_TYPE: NORMAL
MDC_IDC_SESS_CLINIC_NAME: NORMAL
MDC_IDC_SESS_DTM: NORMAL
MDC_IDC_SESS_REPROGRAMMED: NO
MDC_IDC_SESS_TYPE: NORMAL
MDC_IDC_SET_BRADY_LOWRATE: 60 {BEATS}/MIN
MDC_IDC_SET_BRADY_MAX_SENSOR_RATE: 130 {BEATS}/MIN
MDC_IDC_SET_BRADY_MODE: NORMAL
MDC_IDC_SET_LEADCHNL_RV_PACING_AMPLITUDE: 1.12
MDC_IDC_SET_LEADCHNL_RV_PACING_ANODE_ELECTRODE_1: NORMAL
MDC_IDC_SET_LEADCHNL_RV_PACING_ANODE_LOCATION_1: NORMAL
MDC_IDC_SET_LEADCHNL_RV_PACING_CAPTURE_MODE: NORMAL
MDC_IDC_SET_LEADCHNL_RV_PACING_CATHODE_ELECTRODE_1: NORMAL
MDC_IDC_SET_LEADCHNL_RV_PACING_CATHODE_LOCATION_1: NORMAL
MDC_IDC_SET_LEADCHNL_RV_PACING_POLARITY: NORMAL
MDC_IDC_SET_LEADCHNL_RV_PACING_PULSEWIDTH: 0.6 MS
MDC_IDC_SET_LEADCHNL_RV_SENSING_ADAPTATION_MODE: NORMAL
MDC_IDC_SET_LEADCHNL_RV_SENSING_ANODE_ELECTRODE_1: NORMAL
MDC_IDC_SET_LEADCHNL_RV_SENSING_ANODE_LOCATION_1: NORMAL
MDC_IDC_SET_LEADCHNL_RV_SENSING_CATHODE_ELECTRODE_1: NORMAL
MDC_IDC_SET_LEADCHNL_RV_SENSING_CATHODE_LOCATION_1: NORMAL
MDC_IDC_SET_LEADCHNL_RV_SENSING_POLARITY: NORMAL
MDC_IDC_SET_LEADCHNL_RV_SENSING_SENSITIVITY: 2 MV
MDC_IDC_STAT_AT_DTM_END: NORMAL
MDC_IDC_STAT_AT_DTM_START: NORMAL
MDC_IDC_STAT_BRADY_DTM_END: NORMAL
MDC_IDC_STAT_BRADY_DTM_START: NORMAL
MDC_IDC_STAT_BRADY_RV_PERCENT_PACED: 94 %
MDC_IDC_STAT_CRT_DTM_END: NORMAL
MDC_IDC_STAT_CRT_DTM_START: NORMAL
MDC_IDC_STAT_HEART_RATE_DTM_END: NORMAL
MDC_IDC_STAT_HEART_RATE_DTM_START: NORMAL
MDC_IDC_STAT_HEART_RATE_VENTRICULAR_MAX: 230 {BEATS}/MIN
MDC_IDC_STAT_HEART_RATE_VENTRICULAR_MEAN: 71 {BEATS}/MIN
MDC_IDC_STAT_HEART_RATE_VENTRICULAR_MIN: 50 {BEATS}/MIN

## 2021-09-22 ENCOUNTER — DOCUMENTATION ONLY (OUTPATIENT)
Dept: CARDIOLOGY | Facility: CLINIC | Age: 76
End: 2021-09-22

## 2021-09-22 ENCOUNTER — TELEPHONE (OUTPATIENT)
Dept: ANTICOAGULATION | Facility: CLINIC | Age: 76
End: 2021-09-22

## 2021-09-22 DIAGNOSIS — Z95.2 S/P AVR (AORTIC VALVE REPLACEMENT): ICD-10-CM

## 2021-09-22 DIAGNOSIS — I48.21 PERMANENT ATRIAL FIBRILLATION (H): Primary | ICD-10-CM

## 2021-09-22 DIAGNOSIS — Z11.59 ENCOUNTER FOR SCREENING FOR OTHER VIRAL DISEASES: ICD-10-CM

## 2021-09-22 DIAGNOSIS — I34.0 SEVERE MITRAL REGURGITATION: Primary | ICD-10-CM

## 2021-09-22 NOTE — TELEPHONE ENCOUNTER
REJI-PROCEDURAL ANTICOAGULATION  MANAGEMENT    ASSESSMENT     Warfarin interruption plan for Transcatheter mitral valve repair on Monday, 10/4/2021.    Indication for Anticoagulation: Atrial Fibrillation and Mechanical AVR      AVR: 2020 AHA/ACC Management of Valvular Heart disease guidelines suggests bridging is reasonable on individual basis with risk of bleeding weighed against risks of clotting for mechanical AVR patients with risk factors for thrombosis (Afib, previous thromboembolism, hypercoagulable condition, LV systolic dysfunction, older generation valves, or > 1 valve)     Bridging is requested by Dr. Chilel.  Based on Scr of 1.12 from 8/27/2021 patient's estimated CrCl is 58 mL/min. Per [facility] Lovenox dosing protocol a CrCl < 60 warrants a dose reduction from 1 mg/kg to 0.75 mg/kg Lovenox q12hrs.      RECOMMENDATION       Pre-Procedure:  o Hold warfarin for 5 days, until after procedure starting: Wednesday, 9/29/2021  o Enoxaparin (Lovenox) 70 mg subq Q 12 hrs (0.75 mg/kg Q 12 hrs for CrCl 30-60 ml/min per [facility] P&T approved dose adjustment protocol)   - Start enoxaparin: Morning of Friday, 10/1/2021  - Last dose of enoxaparin prior to procedure: Morning of Sunday, 10/3/2021  (~24 hours prior to procedure)      Post-Procedure:  o Resume warfarin dose if okay with provider doing procedure on night of procedure, 10/4/2021 PM: Resume home warfarin dose   o Resume  enoxaparin (Lovenox) ~ 48 hrs post procedure when okay with provider doing procedure. Continue until INR is therapeutic.  o Recheck INR 5-7 days after resuming warfarin   ?   Spoke with Stephen and Wife Joe regarding plan. They understood the plan and were able to repeat back the entire bridge plan accurately. As requested by patient and wife, writer will send bridge plan via [employer].      Braulio Dai, PharmD Candidate    SUBJECTIVE/OBJECTIVE     Sorin Weeks, a 75 year old male    Goal INR Range: 2.0-3.0     Patient  "bridged in past: Yes: 7/2021 for cardiac catheterization     Pertinent History: Atrial Fibrillation, Mechanical AVR from 1996    Wt Readings from Last 3 Encounters:   09/16/21 91.2 kg (201 lb)   08/18/21 90.3 kg (199 lb)   08/12/21 89.4 kg (197 lb)      Ideal body weight: 61.5 kg (135 lb 9.3 oz)  Adjusted ideal body weight: 73.4 kg (161 lb 12 oz)     Estimated body mass index is 33.45 kg/m  as calculated from the following:    Height as of 8/18/21: 1.651 m (5' 5\").    Weight as of 9/16/21: 91.2 kg (201 lb).    Lab Results   Component Value Date    INR 1.21 (H) 07/30/2021    INR 2.14 (H) 07/07/2021    INR 2.84 (H) 06/08/2021     Lab Results   Component Value Date    HGB 12.3 07/30/2021    HCT 37.3 07/30/2021     07/30/2021     Lab Results   Component Value Date    CR 1.09 07/30/2021    CR 1.16 07/14/2021    CR 1.25 06/08/2021     Estimated CrCl is 58 mL/min    "

## 2021-09-22 NOTE — LETTER
"  Sorin Weeks  [address] [address]      Procedure Instructions for Anticoagulation     Your healthcare provider wants you to stop warfarin prior to your upcoming Transcatheter mitral valve repair on  Monday, 10/4/2021. To protect you from a clot while your INR is low, your provider wants you to inject a short-acting medication called enoxaparin (Lovenox) at home, this is called \"bridging\".  Below is a copy of our plan for your warfarin (Coumadin) and enoxaparin (Lovenox).     BEFORE your procedure:       5 days before    Wednesday 9/29/2021   4 days before    Thursday 9/30/2021   3 days before    Friday  10/1/2021   2 days before    Saturday  10/2/2021   1 day   before    Yinka  10/3/2021   Day of Procedure    Monday  10/4/2021     Warfarin  (Coumadin, Jantoven)     No warfarin     No warfarin     No warfarin   No warfarin     No warfarin     NO warfarin   in AM     Enoxaparin  (Lovenox)      Your dose:  70 mg   (0.7 mL -partial syringe)   No enoxaparin      -   No enoxaparin      -   Enoxaparin in AM      Enoxaparin in PM   Enoxaparin in AM      Enoxaparin in PM   Enoxaparin ONLY in AM      NO enoxaparin in PM   NO  Enoxaparin                    AFTER your procedure:    After the procedure you will go back to taking warfarin and may need to take enoxaparin (Lovenox) until your INR is at or above 2.0.     Please ask when you are to take your next dose of warfarin and Lovenox at hospital discharge. Please arrange to check your INR with [facility] 4-7 days after restarting warfarin.       Please watch for symptoms of both bleeding and clotting.      Bleeding:    Call 911 or go to the emergency room if you: are coughing or throwing up blood, can't control bleeding from a cut or injury after putting pressure on it, have a sudden severe headache, have red or black stools, or have red or orange urine.      Call your care team if you have: bleeding gums, large bruises, pale " skin.     Clotting:    Call 911 or go to the emergency room if you have:     Chest pain or shortness of breath    Any symptoms of a stroke including: sudden numbness or weakness in your face, arm or leg; sudden confusion or trouble speaking, reading or understanding; sudden blurred or decreased vision; sudden trouble walking or moving a part of the body; sudden severe headache for no reason.      Call your care team if you have: sudden pain, tenderness or swelling in your leg or arm    Please contact [facility] at [phone] if you have any questions or concerns.     Braulio Dai, PharmD Candidate

## 2021-09-22 NOTE — PROGRESS NOTES
Plan made. Patient instructed see 9/22 telephone encounter    Josefa Fuller, [facility]

## 2021-09-22 NOTE — PROGRESS NOTES
Pt scheduled for tMVr with MitraClip on Oct 4 at [facility] with Dr Chilel  Plan to hold warfarin x 5 days and bridge with lovenox. Pt with mechanical AVR.  Notified anticoagulation team. Please contact patient with instructions.   Note that pt normally has INR managed by PCP office ([facility])      Lenin Aguilera RN, Structural Heart Coordinator  [facility]  Valve Clinic [phone]  Fax: [phone]  9/22/21 11:55 AM

## 2021-09-23 ENCOUNTER — ALLIED HEALTH/NURSE VISIT (OUTPATIENT)
Dept: CARDIOLOGY | Facility: CLINIC | Age: 76
End: 2021-09-23
Payer: COMMERCIAL

## 2021-09-23 ENCOUNTER — LAB (OUTPATIENT)
Dept: CARDIOLOGY | Facility: CLINIC | Age: 76
End: 2021-09-23
Payer: MEDICARE

## 2021-09-23 ENCOUNTER — OFFICE VISIT (OUTPATIENT)
Dept: CARDIOLOGY | Facility: CLINIC | Age: 76
End: 2021-09-23
Payer: MEDICARE

## 2021-09-23 VITALS
BODY MASS INDEX: 32.15 KG/M2 | SYSTOLIC BLOOD PRESSURE: 122 MMHG | RESPIRATION RATE: 16 BRPM | DIASTOLIC BLOOD PRESSURE: 40 MMHG | HEART RATE: 60 BPM | HEIGHT: 65 IN | WEIGHT: 193 LBS

## 2021-09-23 DIAGNOSIS — I34.0 SEVERE MITRAL REGURGITATION: ICD-10-CM

## 2021-09-23 DIAGNOSIS — Z95.0 PACEMAKER: ICD-10-CM

## 2021-09-23 DIAGNOSIS — E11.9 TYPE 2 DIABETES MELLITUS WITHOUT COMPLICATION, WITHOUT LONG-TERM CURRENT USE OF INSULIN (H): ICD-10-CM

## 2021-09-23 DIAGNOSIS — I48.21 PERMANENT ATRIAL FIBRILLATION (H): ICD-10-CM

## 2021-09-23 DIAGNOSIS — I34.0 NONRHEUMATIC MITRAL VALVE REGURGITATION: Primary | ICD-10-CM

## 2021-09-23 DIAGNOSIS — I50.20 HEART FAILURE WITH REDUCED EJECTION FRACTION, NYHA CLASS III (H): ICD-10-CM

## 2021-09-23 DIAGNOSIS — I34.0 MITRAL REGURGITATION: Primary | ICD-10-CM

## 2021-09-23 DIAGNOSIS — Z95.2 S/P AVR (AORTIC VALVE REPLACEMENT): ICD-10-CM

## 2021-09-23 PROBLEM — I87.2 CHRONIC STASIS DERMATITIS OF LEFT LOWER EXTREMITY: Status: ACTIVE | Noted: 2017-11-21

## 2021-09-23 LAB
ABO/RH(D): NORMAL
ALBUMIN SERPL-MCNC: 3.9 G/DL (ref 3.5–5)
ALP SERPL-CCNC: 67 U/L (ref 45–120)
ALT SERPL W P-5'-P-CCNC: 15 U/L (ref 0–45)
ANION GAP SERPL CALCULATED.3IONS-SCNC: 8 MMOL/L (ref 5–18)
ANTIBODY SCREEN: NEGATIVE
AST SERPL W P-5'-P-CCNC: 15 U/L (ref 0–40)
ATRIAL RATE - MUSE: 59 BPM
BASOPHILS # BLD AUTO: 0 10E3/UL (ref 0–0.2)
BASOPHILS NFR BLD AUTO: 1 %
BILIRUB SERPL-MCNC: 1.1 MG/DL (ref 0–1)
BNP SERPL-MCNC: 1129 PG/ML (ref 0–76)
BUN SERPL-MCNC: 36 MG/DL (ref 8–28)
CALCIUM SERPL-MCNC: 10.4 MG/DL (ref 8.5–10.5)
CHLORIDE BLD-SCNC: 108 MMOL/L (ref 98–107)
CO2 SERPL-SCNC: 27 MMOL/L (ref 22–31)
CREAT SERPL-MCNC: 1.33 MG/DL (ref 0.7–1.3)
DIASTOLIC BLOOD PRESSURE - MUSE: NORMAL MMHG
EOSINOPHIL # BLD AUTO: 0.1 10E3/UL (ref 0–0.7)
EOSINOPHIL NFR BLD AUTO: 1 %
ERYTHROCYTE [DISTWIDTH] IN BLOOD BY AUTOMATED COUNT: 15.8 % (ref 10–15)
GFR SERPL CREATININE-BSD FRML MDRD: 52 ML/MIN/1.73M2
GLUCOSE BLD-MCNC: 185 MG/DL (ref 70–125)
HCT VFR BLD AUTO: 38.6 % (ref 40–53)
HGB BLD-MCNC: 12.4 G/DL (ref 13.3–17.7)
IMM GRANULOCYTES # BLD: 0 10E3/UL
IMM GRANULOCYTES NFR BLD: 1 %
INR PPP: 2.43 (ref 0.9–1.15)
INTERPRETATION ECG - MUSE: NORMAL
LYMPHOCYTES # BLD AUTO: 0.9 10E3/UL (ref 0.8–5.3)
LYMPHOCYTES NFR BLD AUTO: 15 %
MAGNESIUM SERPL-MCNC: 2.1 MG/DL (ref 1.8–2.6)
MCH RBC QN AUTO: 32.3 PG (ref 26.5–33)
MCHC RBC AUTO-ENTMCNC: 32.1 G/DL (ref 31.5–36.5)
MCV RBC AUTO: 101 FL (ref 78–100)
MONOCYTES # BLD AUTO: 0.7 10E3/UL (ref 0–1.3)
MONOCYTES NFR BLD AUTO: 12 %
NEUTROPHILS # BLD AUTO: 4.4 10E3/UL (ref 1.6–8.3)
NEUTROPHILS NFR BLD AUTO: 70 %
NRBC # BLD AUTO: 0 10E3/UL
NRBC BLD AUTO-RTO: 0 /100
P AXIS - MUSE: NORMAL DEGREES
PLATELET # BLD AUTO: 139 10E3/UL (ref 150–450)
POTASSIUM BLD-SCNC: 4.3 MMOL/L (ref 3.5–5)
PR INTERVAL - MUSE: NORMAL MS
PROT SERPL-MCNC: 7.2 G/DL (ref 6–8)
QRS DURATION - MUSE: 222 MS
QT - MUSE: 508 MS
QTC - MUSE: 508 MS
R AXIS - MUSE: -78 DEGREES
RBC # BLD AUTO: 3.84 10E6/UL (ref 4.4–5.9)
SODIUM SERPL-SCNC: 143 MMOL/L (ref 136–145)
SPECIMEN EXPIRATION DATE: NORMAL
SYSTOLIC BLOOD PRESSURE - MUSE: NORMAL MMHG
T AXIS - MUSE: 94 DEGREES
VENTRICULAR RATE- MUSE: 60 BPM
WBC # BLD AUTO: 6.1 10E3/UL (ref 4–11)

## 2021-09-23 PROCEDURE — 99207 PR NO CHARGE NURSE ONLY: CPT

## 2021-09-23 PROCEDURE — 86850 RBC ANTIBODY SCREEN: CPT

## 2021-09-23 PROCEDURE — 85610 PROTHROMBIN TIME: CPT | Performed by: INTERNAL MEDICINE

## 2021-09-23 PROCEDURE — 85025 COMPLETE CBC W/AUTO DIFF WBC: CPT | Performed by: INTERNAL MEDICINE

## 2021-09-23 PROCEDURE — 36415 COLL VENOUS BLD VENIPUNCTURE: CPT | Performed by: INTERNAL MEDICINE

## 2021-09-23 PROCEDURE — 83735 ASSAY OF MAGNESIUM: CPT | Performed by: INTERNAL MEDICINE

## 2021-09-23 PROCEDURE — 86901 BLOOD TYPING SEROLOGIC RH(D): CPT

## 2021-09-23 PROCEDURE — 80053 COMPREHEN METABOLIC PANEL: CPT | Performed by: INTERNAL MEDICINE

## 2021-09-23 PROCEDURE — 83880 ASSAY OF NATRIURETIC PEPTIDE: CPT | Performed by: INTERNAL MEDICINE

## 2021-09-23 PROCEDURE — 93000 ELECTROCARDIOGRAM COMPLETE: CPT | Performed by: INTERNAL MEDICINE

## 2021-09-23 PROCEDURE — 99215 OFFICE O/P EST HI 40 MIN: CPT | Performed by: INTERNAL MEDICINE

## 2021-09-23 PROCEDURE — 86900 BLOOD TYPING SEROLOGIC ABO: CPT

## 2021-09-23 RX ORDER — CHLORHEXIDINE GLUCONATE ORAL RINSE 1.2 MG/ML
15 SOLUTION DENTAL 2 TIMES DAILY
Qty: 473 ML | Refills: 0 | Status: SHIPPED | OUTPATIENT
Start: 2021-09-23 | End: 2021-10-18

## 2021-09-23 ASSESSMENT — MIFFLIN-ST. JEOR: SCORE: 1537.32

## 2021-09-23 NOTE — PROGRESS NOTES
HEART CARE ENCOUNTER NOTE       [facility]  [phone]    Assessment/Recommendations   Problem List Items Addressed This Visit        Endocrine    Type 2 diabetes mellitus without complication, without long-term current use of insulin (H)       Circulatory    Permanent Atrial Fibrillation    Heart failure with reduced ejection fraction, NYHA class III (H)    Nonrheumatic mitral valve regurgitation - Primary       Other    Pacemaker    S/P AVR (aortic valve replacement)          1.  Mitral Regurgitation: this has become severe and is now causing significant dyspnea on exertion, some LE edema, orthopnea and decreased .  He has been evaluated by a multidisciplinary team and because of his prior sternotomy, he is a better candidate for transcatheter mitral valve repair with MitraClip.       Details of the procedure were discussed with the patient and there is understanding of the risks and benefits.   All questions were answered   Consents were signed and witnessed by me.  He has no prior history of trouble with anesthesia.    Labs today reveal Hgb 12.4, normal WBC, electrolytes within normal limits and creat 1.33     Sorin Weeks will report on the morning of October 4 for the procedure and all instructions regarding times and medications were given by MitraClip coordinator RN.     2.  Dental caries - it was recommended that he have multiple teeth pulled by 3 different dentists, but this was not something he could afford, so has chosen to forgo this dental work.  He understands the risk of mouth bacteria affecting the heart valves.  He has been gargling with water/hydrogen peroxide for last few weeks and will prescribe Peridex for him to use a week prior to procedure.     3.  Acute on chronic combined systolic and diastolic heart failure, NYHA class III -evidenced by elevated BNP/NTproBNP, orthopnea, lower extremity edema and dyspnea on exertion with less than normal activity.  He is  currently on appropriate beta-blocker, ACE and diuretic therapy    4.  Type 2 diabetes mellitus -noninsulin dependent.  Patient takes Metformin and glipizide currently.  Last hemoglobin A1c was 6.2    5.  Hypertension -blood pressure today is controlled.  Patient will continue taking furosemide 20 mg twice daily, lisinopril/hydrochlorothiazide 20/12.5 mg daily and metoprolol succinate 100 mg daily    6.  Single-vessel coronary artery disease -asymptomatic.  Continue medical therapy.    7.  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  We will continue to follow symptoms after MitraClip and consider transcatheter closure of the PVL.      History of Present Illness/Subjective    Sorin Weeks is a 75 year old male who comes in today for history and physical prior to MitraClip implantation.  His wife accompanies him to the visit today.    Stephen has history of valvular heart disease and underwent mechanical aortic valve replacement in 1996.  He has had some PVL that has been watched over the last several years.  He also has mitral regurgitation which has been followed, combined systolic and diastolic heart failure, diabetes mellitus, hypertension, osteoarthritis and obstructive sleep apnea.  Patient used to wear CPAP, but does not really tolerate using it and since he is lost about 60 pounds his sleep apnea symptoms have improved according to his wife.  The patient has noticed increased lower extremity edema and decreased exercise capacity in the last several weeks.  He normally follows with Dr. Campos and repeat echocardiogram early this summer showed progression of his mitral regurgitation.  JULI confirmed severe mitral regurgitation and he was sent to valve clinic.  He has needed to shorten his walks with his dog and does get  short of breath with inclines or stairs.  He also is somewhat limited by his neuropathy and osteoarthritis.  He has been wearing compression stockings for his lower extremity edema.  He was sent to a dentist as part of his preop work-up for MitraClip and has multiple dental caries.  He was recommended by 3 different dentist that he have multiple teeth pulled, but patient cannot afford this.  In the last couple of weeks, he tells me he has been gargling with a combination of water and hydrogen peroxide.    Sorin Weeks denies chest discomfort, palpitations, paroxysmal nocturnal dyspnea, lightheadedness, dizziness, pre-syncope, or syncope, changes in appetite, nausea or vomiting.     Medical, surgical, family, social history, and medications were reviewed and updated as necessary.    EC2021 shows paced rhythm at 60 bpm    JULI done on :    Left Ventricle: Normal left ventricular size and systolic function.The estimated left ventricular ejection fraction is 55%. This represents a normal ejection fraction.    The left ventricular wall motion is normal.    Right Ventricle: Right ventricle not well visualized. Normal right ventricular size.    Aortic Valve: There is a bileaflet tilting disc mechanical valve present. There are multiple areas of paravalvular leak and together is moderate in nature.. Images 30 through 34 show the paravalvular leak at the 11 PM, 12 noon, and 1 PM area. There is   moderate paravalvular insufficiency present.    Mitral Valve: The following structural abnormalities were observed: non-specific thickening. No mitral stenosis present. Moderate to severe mitral regurgitation. There is significant mitral insufficiency with 2 separate jets, 1 directed anteriorly 1   directed posteriorly. The anterior directed jet appears to be the more prominent jet. No flail leaflet or loose chordae noted. The eccentric jet wraps around toward the posterior wall but unable to get Doppler images of  "the pulmonary veins. The jet is   anterior directed and is eccentric.     When compared to transesophageal echocardiogram from August 2015, the degree of aortic insufficiency may be slightly more prominent.  Mitral insufficiency severity is dramatically more prominent than the previous study.    CATH done on July 30:  Findings:  LM:no obstruction, large/ectatic vessel  LAD:mildy irregular  Lcx:OM2 is a large vessel that appears to be  or subtotally occluded w/ L-L collateral filling  RCA:dominant, no obstruction     LVEDP:20     RHC:  RA:8   RV:66/6  PA:64/16 (37)  PCWP:23  SvO2:61     CO/CI:  Deja:3.77/2.0     AV gradient (crossed w/ FFR wire): 20 simult, 8 pullback  LV waveform without rapid pressure equalization, speaking against severe AI       Physical Examination Review of Systems   Vitals: /40 (BP Location: Left arm, Patient Position: Sitting, Cuff Size: Adult Regular)   Pulse 60   Resp 16   Ht 1.651 m (5' 5\")   Wt 87.5 kg (193 lb)   BMI 32.12 kg/m    BMI= Body mass index is 32.12 kg/m .  Wt Readings from Last 3 Encounters:   09/23/21 87.5 kg (193 lb)   09/16/21 91.2 kg (201 lb)   08/18/21 90.3 kg (199 lb)       General Appearance:   Alert, cooperative and in no acute distress   ENT/Mouth: membranes moist, no oral lesions or bleeding gums.      EYES:  no scleral icterus, normal conjunctivae   Neck: no carotid bruits.  Thyroid not visualized   Chest/Lungs:   lungs are clear to auscultation, no rales or wheezing   Cardiovascular:   Regular. Normal first and second heart sounds with no systolic murmur, but with good valve click in aortic position.  No rubs or gallops; the carotid, radial and posterior tibial pulses are intact, 1+ edema bilaterally and compression stockings in place   Abdomen:  Soft and nontender. Bowel sounds are present in all quadrants   Extremities: no cyanosis or clubbing   Skin: no xanthelasma, warm.    Neurologic: normal gait, normal  bilateral, no tremors   Psychiatric: " Normal mood and affect       Please refer above for cardiac ROS details.      Medical History  Surgical History Family History Social History   Past Medical History:   Diagnosis Date     Diabetes mellitus (H)      Hypertension      Past Surgical History:   Procedure Laterality Date     APPENDECTOMY       C REPLACE AORT VALV,PROSTH VALV      Description: Aortic Valve Replacement;  Recorded: 06/05/2013;     CARDIAC CATHETERIZATION       CV CORONARY ANGIOGRAM N/A 7/30/2021    Procedure: Coronary Angiogram;  Surgeon: Leonel Romero MD;  Location: [facility]     CV LEFT HEART CATH N/A 7/30/2021    Procedure: Left Heart Cath;  Surgeon: Leonel Romero MD;  Location: [facility]     CV RIGHT HEART CATH MEASUREMENTS RECORDED N/A 7/30/2021    Procedure: Right Heart Cath;  Surgeon: Leonel Romero MD;  Location: [facility]     CV SUPRAVALVULAR AORTOGRAM N/A 7/30/2021    Procedure: Supravalvular Aortagram;  Surgeon: Leonel Romero MD;  Location: [facility]     INSERT / REPLACE / REMOVE PACEMAKER       TOTAL SHOULDER REPLACEMENT Left      No family history on file. Social History     Socioeconomic History     Marital status:      Spouse name: Not on file     Number of children: Not on file     Years of education: Not on file     Highest education level: Not on file   Occupational History     Not on file   Tobacco Use     Smoking status: Former Smoker     Types: Cigarettes     Smokeless tobacco: Never Used   Substance and Sexual Activity     Alcohol use: Never     Drug use: Never     Sexual activity: Not on file   Other Topics Concern     Not on file   Social History Narrative     Not on file     Social Determinants of Health     Financial Resource Strain:      Difficulty of Paying Living Expenses:    Food Insecurity:      Worried About Running Out of Food in the Last Year:      Ran Out of Food in the Last Year:    Transportation Needs:      Lack of Transportation  (Medical):      Lack of Transportation (Non-Medical):    Physical Activity:      Days of Exercise per Week:      Minutes of Exercise per Session:    Stress:      Feeling of Stress :    Social Connections:      Frequency of Communication with Friends and Family:      Frequency of Social Gatherings with Friends and Family:      Attends Baptism Services:      Active Member of Clubs or Organizations:      Attends Club or Organization Meetings:      Marital Status:    Intimate Partner Violence:      Fear of Current or Ex-Partner:      Emotionally Abused:      Physically Abused:      Sexually Abused:           Medications  Allergies   Current Outpatient Medications   Medication Sig Dispense Refill     finasteride (PROSCAR) 5 mg tablet [FINASTERIDE (PROSCAR) 5 MG TABLET] 5 mg. Take one tablet by mouth daily.  5     furosemide (LASIX) 20 MG tablet [FUROSEMIDE (LASIX) 20 MG TABLET] Take 20 mg by mouth 2 (two) times a day. 40 mg qam and 20 mg in the evening per pt       glipiZIDE (GLUCOTROL) 10 MG 24 hr tablet [GLIPIZIDE (GLUCOTROL) 10 MG 24 HR TABLET] Take 10 mg by mouth daily.       lisinopril-hydrochlorothiazide (PRINZIDE,ZESTORETIC) 20-12.5 mg per tablet [LISINOPRIL-HYDROCHLOROTHIAZIDE (PRINZIDE,ZESTORETIC) 20-12.5 MG PER TABLET] Take 1 tablet by mouth daily.  0     metFORMIN (GLUCOPHAGE) 500 MG tablet [METFORMIN (GLUCOPHAGE) 500 MG TABLET] Take 500 mg by mouth 2 (two) times a day.       metoprolol succinate (TOPROL-XL) 100 MG 24 hr tablet [METOPROLOL SUCCINATE (TOPROL-XL) 100 MG 24 HR TABLET] Take 100 mg by mouth daily.       multivit,calc,mins/folic acid (ONE-A-DAY PROACTIVE 65 PLUS ORAL) [MULTIVIT,CALC,MINS/FOLIC ACID (ONE-A-DAY PROACTIVE 65 PLUS ORAL)] Take 1 tablet by mouth daily.       tamsulosin (FLOMAX) 0.4 mg Cp24 [TAMSULOSIN (FLOMAX) 0.4 MG CP24] 0.4 mg Daily after breakfast.   5     warfarin (COUMADIN) 5 MG tablet [WARFARIN (COUMADIN) 5 MG TABLET] Take 5mg daiy except on Tuesdays take 1/2 tab (2.5mg) - Take  "as directed       enoxaparin ANTICOAGULANT (LOVENOX) 80 MG/0.8ML syringe Inject 0.7 mLs (70 mg) Subcutaneous every 12 hours Before and after procedure. Continue after procedure until INR >= 2 (Patient not taking: Reported on 9/23/2021) 12.8 mL 1    Allergies   Allergen Reactions     Atenolol Unknown     Blood-Group Specific Substance      Anti-Jka present. Please allow up to 3 HRS for blood for transfusion. Draw 2 EDTA for all Type and Screen orders.     Buprenorphine Hcl [Buprenorphine] Other (See Comments)     pt. felt \"loopy\"     Codeine Unknown     Penicillins Unknown     Sulfa (Sulfonamide Antibiotics) [Sulfa Drugs] Unknown     Vancomycin Other (See Comments)     \"shuts down kidneys. Destroying venin\" per wife.         Lab Results    Chemistry/lipid CBC Cardiac Enzymes/BNP/TSH/INR   Recent Labs   Lab Test 07/30/21  0852   CHOL 128   HDL 28*   LDL 74   TRIG 129     Recent Labs   Lab Test 07/30/21  0852   LDL 74     Recent Labs   Lab Test 07/30/21  0852      POTASSIUM 4.2   CHLORIDE 106   CO2 27      BUN 31*   CR 1.09   GFRESTIMATED 66   MARK 10.3     Recent Labs   Lab Test 07/30/21  0852 07/14/21  1403 06/08/21  1546   CR 1.09 1.16 1.25     No results for input(s): A1C in the last 37457 hours. Recent Labs   Lab Test 07/30/21  0852   WBC 6.1   HGB 12.3*   HCT 37.3*   MCV 95   *     Recent Labs   Lab Test 07/30/21  0852 06/08/21  1546 05/17/18  1733   HGB 12.3* 12.4* 14.0    No results for input(s): TROPONINI in the last 45906 hours.  Recent Labs   Lab Test 07/14/21  1403 06/08/21  1546   BNP  --  1,264*   NTBNP 7,991*  --      No results for input(s): TSH in the last 80546 hours.  Recent Labs   Lab Test 07/30/21  0851 07/07/21  1224 06/08/21  1546   INR 1.21* 2.14* 2.84*        53 minutes spent on the date of encounter doing education, consent signing, chart prep/review, review of test results, interpretation with above tests, patient visit, documentation and discussion with family.        This " note has been dictated using voice recognition software. Any grammatical or context distortions are unintentional and inherent to the software.

## 2021-09-23 NOTE — NURSING NOTE
Reason for today s visit: Pre-op RN Visit    Patient scheduled for Transcatheter Mitral Valve Repair with MitraClip on Monday 10/4/21  arrival time: 0545 for 1st case  at [facility] with Dr. Chilel    Referring provider: Dr. Campos  Primary Cardiology: Dr. Campos  PCP: Joseph Quevedo    CT surgery consult completed: Dr. Borden (date 8/18/21)  Dental clearance: No, see documentation      Tests completed today:  Pre-procedure labs drawn: CMP, CBC, Mag, BNP, Type and Screen  EKG      STS score: 3% repair, 4.5% rplcmnt    Serum albumin (date completed 9/23/21): pending  Duran index (date completed 9/23/21): 6/6      Total Frailty Score: 0      Patient instructed on medications:   Hold warfarin and bridge with lovenox as directed  Use Peridex mouthwash as directed  Morning of procedure only take metoprolol  Loading dose of Plavix: no  Loading dose of ASA: 325 mg in 3C      Patient instructed on skin prep:  Patient sent home with hibiclens and instructed for skin prep be done evening before procedure.        Surgery and anesthesia consents  Smita Matos PAC reviewed risks of mitraclip/JULI and signed consents  Consents signed and scanned into media  Anesthesia consents to be completed am of surgery      Pre and post procedure education was also reviewed with the patient and wife. No further questions and ready to proceed with surgery as planned.    Instructed to come to the main entrance of [facility] at 0545 AM on Monday 10/4/21      All questions were answered to patient and wife by Smita LUCAS and Lenin Aguilera RN    Wife Joe present at the time of appointment.      Lenin Aguilera RN  [facility]  [facility]  Phone: [phone]  Fax: [phone]  09/23/21  9:51 AM

## 2021-09-23 NOTE — LETTER
9/23/2021    Joseph COSTELLO [facility] [place] [address] [place] [address]    RE: Sorin Weeks       Dear Colleague,    I had the pleasure of seeing Sorin Weeks in the [facility].    HEART CARE ENCOUNTER NOTE       [facility]  [phone]    Assessment/Recommendations   Problem List Items Addressed This Visit        Endocrine    Type 2 diabetes mellitus without complication, without long-term current use of insulin (H)       Circulatory    Permanent Atrial Fibrillation    Heart failure with reduced ejection fraction, NYHA class III (H)    Nonrheumatic mitral valve regurgitation - Primary       Other    Pacemaker    S/P AVR (aortic valve replacement)          1.  Mitral Regurgitation: this has become severe and is now causing significant dyspnea on exertion, some LE edema, orthopnea and decreased .  He has been evaluated by a multidisciplinary team and because of his prior sternotomy, he is a better candidate for transcatheter mitral valve repair with MitraClip.       Details of the procedure were discussed with the patient and there is understanding of the risks and benefits.   All questions were answered   Consents were signed and witnessed by me.  He has no prior history of trouble with anesthesia.    Labs today reveal Hgb 12.4, normal WBC, electrolytes within normal limits and creat 1.33     Sorin Weeks will report on the morning of October 4 for the procedure and all instructions regarding times and medications were given by MitraClip coordinator RN.     2.  Dental caries - it was recommended that he have multiple teeth pulled by 3 different dentists, but this was not something he could afford, so has chosen to forgo this dental work.  He understands the risk of mouth bacteria affecting the heart valves.  He has been gargling with water/hydrogen peroxide for last few weeks and will  prescribe Peridex for him to use a week prior to procedure.     3.  Acute on chronic combined systolic and diastolic heart failure, NYHA class III -evidenced by elevated BNP/NTproBNP, orthopnea, lower extremity edema and dyspnea on exertion with less than normal activity.  He is currently on appropriate beta-blocker, ACE and diuretic therapy    4.  Type 2 diabetes mellitus -noninsulin dependent.  Patient takes Metformin and glipizide currently.  Last hemoglobin A1c was 6.2    5.  Hypertension -blood pressure today is controlled.  Patient will continue taking furosemide 20 mg twice daily, lisinopril/hydrochlorothiazide 20/12.5 mg daily and metoprolol succinate 100 mg daily    6.  Single-vessel coronary artery disease -asymptomatic.  Continue medical therapy.    7.  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  We will continue to follow symptoms after MitraClip and consider transcatheter closure of the PVL.      History of Present Illness/Subjective    Sorin Weeks is a 75 year old male who comes in today for history and physical prior to MitraClip implantation.  His wife accompanies him to the visit today.    Stephen has history of valvular heart disease and underwent mechanical aortic valve replacement in 1996.  He has had some PVL that has been watched over the last several years.  He also has mitral regurgitation which has been followed, combined systolic and diastolic heart failure, diabetes mellitus, hypertension, osteoarthritis and obstructive sleep apnea.  Patient used to wear CPAP, but does not really tolerate using it and since he is lost about 60 pounds his sleep apnea symptoms have improved according to his wife.  The patient has noticed increased lower extremity edema and decreased exercise capacity in the last  several weeks.  He normally follows with Dr. Campos and repeat echocardiogram early this summer showed progression of his mitral regurgitation.  JULI confirmed severe mitral regurgitation and he was sent to valve clinic.  He has needed to shorten his walks with his dog and does get short of breath with inclines or stairs.  He also is somewhat limited by his neuropathy and osteoarthritis.  He has been wearing compression stockings for his lower extremity edema.  He was sent to a dentist as part of his preop work-up for MitraClip and has multiple dental caries.  He was recommended by 3 different dentist that he have multiple teeth pulled, but patient cannot afford this.  In the last couple of weeks, he tells me he has been gargling with a combination of water and hydrogen peroxide.    Sorin Weeks denies chest discomfort, palpitations, paroxysmal nocturnal dyspnea, lightheadedness, dizziness, pre-syncope, or syncope, changes in appetite, nausea or vomiting.     Medical, surgical, family, social history, and medications were reviewed and updated as necessary.    EC2021 shows paced rhythm at 60 bpm    JULI done on :    Left Ventricle: Normal left ventricular size and systolic function.The estimated left ventricular ejection fraction is 55%. This represents a normal ejection fraction.    The left ventricular wall motion is normal.    Right Ventricle: Right ventricle not well visualized. Normal right ventricular size.    Aortic Valve: There is a bileaflet tilting disc mechanical valve present. There are multiple areas of paravalvular leak and together is moderate in nature.. Images 30 through 34 show the paravalvular leak at the 11 PM, 12 noon, and 1 PM area. There is   moderate paravalvular insufficiency present.    Mitral Valve: The following structural abnormalities were observed: non-specific thickening. No mitral stenosis present. Moderate to severe mitral regurgitation. There is significant mitral  "insufficiency with 2 separate jets, 1 directed anteriorly 1   directed posteriorly. The anterior directed jet appears to be the more prominent jet. No flail leaflet or loose chordae noted. The eccentric jet wraps around toward the posterior wall but unable to get Doppler images of the pulmonary veins. The jet is   anterior directed and is eccentric.     When compared to transesophageal echocardiogram from August 2015, the degree of aortic insufficiency may be slightly more prominent.  Mitral insufficiency severity is dramatically more prominent than the previous study.    CATH done on July 30:  Findings:  LM:no obstruction, large/ectatic vessel  LAD:mildy irregular  Lcx:OM2 is a large vessel that appears to be  or subtotally occluded w/ L-L collateral filling  RCA:dominant, no obstruction     LVEDP:20     RHC:  RA:8   RV:66/6  PA:64/16 (37)  PCWP:23  SvO2:61     CO/CI:  Deja:3.77/2.0     AV gradient (crossed w/ FFR wire): 20 simult, 8 pullback  LV waveform without rapid pressure equalization, speaking against severe AI       Physical Examination Review of Systems   Vitals: /40 (BP Location: Left arm, Patient Position: Sitting, Cuff Size: Adult Regular)   Pulse 60   Resp 16   Ht 1.651 m (5' 5\")   Wt 87.5 kg (193 lb)   BMI 32.12 kg/m    BMI= Body mass index is 32.12 kg/m .  Wt Readings from Last 3 Encounters:   09/23/21 87.5 kg (193 lb)   09/16/21 91.2 kg (201 lb)   08/18/21 90.3 kg (199 lb)       General Appearance:   Alert, cooperative and in no acute distress   ENT/Mouth: membranes moist, no oral lesions or bleeding gums.      EYES:  no scleral icterus, normal conjunctivae   Neck: no carotid bruits.  Thyroid not visualized   Chest/Lungs:   lungs are clear to auscultation, no rales or wheezing   Cardiovascular:   Regular. Normal first and second heart sounds with no systolic murmur, but with good valve click in aortic position.  No rubs or gallops; the carotid, radial and posterior tibial pulses are " intact, 1+ edema bilaterally and compression stockings in place   Abdomen:  Soft and nontender. Bowel sounds are present in all quadrants   Extremities: no cyanosis or clubbing   Skin: no xanthelasma, warm.    Neurologic: normal gait, normal  bilateral, no tremors   Psychiatric: Normal mood and affect       Please refer above for cardiac ROS details.      Medical History  Surgical History Family History Social History   Past Medical History:   Diagnosis Date     Diabetes mellitus (H)      Hypertension      Past Surgical History:   Procedure Laterality Date     APPENDECTOMY       C REPLACE AORT VALV,PROSTH VALV      Description: Aortic Valve Replacement;  Recorded: 06/05/2013;     CARDIAC CATHETERIZATION       CV CORONARY ANGIOGRAM N/A 7/30/2021    Procedure: Coronary Angiogram;  Surgeon: Leonel Romero MD;  Location: [facility] CATH LAB CV     CV LEFT HEART CATH N/A 7/30/2021    Procedure: Left Heart Cath;  Surgeon: Leonel Romero MD;  Location: [facility] CATH LAB CV     CV RIGHT HEART CATH MEASUREMENTS RECORDED N/A 7/30/2021    Procedure: Right Heart Cath;  Surgeon: Leonel Romero MD;  Location: [facility] CATH LAB CV     CV SUPRAVALVULAR AORTOGRAM N/A 7/30/2021    Procedure: Supravalvular Aortagram;  Surgeon: Leonel Romero MD;  Location: [facility] CATH LAB CV     INSERT / REPLACE / REMOVE PACEMAKER       TOTAL SHOULDER REPLACEMENT Left      No family history on file. Social History     Socioeconomic History     Marital status:      Spouse name: Not on file     Number of children: Not on file     Years of education: Not on file     Highest education level: Not on file   Occupational History     Not on file   Tobacco Use     Smoking status: Former Smoker     Types: Cigarettes     Smokeless tobacco: Never Used   Substance and Sexual Activity     Alcohol use: Never     Drug use: Never     Sexual activity: Not on file   Other Topics Concern     Not on file   Social History Narrative     Not on  file     Social Determinants of Health     Financial Resource Strain:      Difficulty of Paying Living Expenses:    Food Insecurity:      Worried About Running Out of Food in the Last Year:      Ran Out of Food in the Last Year:    Transportation Needs:      Lack of Transportation (Medical):      Lack of Transportation (Non-Medical):    Physical Activity:      Days of Exercise per Week:      Minutes of Exercise per Session:    Stress:      Feeling of Stress :    Social Connections:      Frequency of Communication with Friends and Family:      Frequency of Social Gatherings with Friends and Family:      Attends Caoda[employer] Services:      Active Member of Clubs or Organizations:      Attends Club or Organization Meetings:      Marital Status:    Intimate Partner Violence:      Fear of Current or Ex-Partner:      Emotionally Abused:      Physically Abused:      Sexually Abused:           Medications  Allergies   Current Outpatient Medications   Medication Sig Dispense Refill     finasteride (PROSCAR) 5 mg tablet [FINASTERIDE (PROSCAR) 5 MG TABLET] 5 mg. Take one tablet by mouth daily.  5     furosemide (LASIX) 20 MG tablet [FUROSEMIDE (LASIX) 20 MG TABLET] Take 20 mg by mouth 2 (two) times a day. 40 mg qam and 20 mg in the evening per pt       glipiZIDE (GLUCOTROL) 10 MG 24 hr tablet [GLIPIZIDE (GLUCOTROL) 10 MG 24 HR TABLET] Take 10 mg by mouth daily.       lisinopril-hydrochlorothiazide (PRINZIDE,ZESTORETIC) 20-12.5 mg per tablet [LISINOPRIL-HYDROCHLOROTHIAZIDE (PRINZIDE,ZESTORETIC) 20-12.5 MG PER TABLET] Take 1 tablet by mouth daily.  0     metFORMIN (GLUCOPHAGE) 500 MG tablet [METFORMIN (GLUCOPHAGE) 500 MG TABLET] Take 500 mg by mouth 2 (two) times a day.       metoprolol succinate (TOPROL-XL) 100 MG 24 hr tablet [METOPROLOL SUCCINATE (TOPROL-XL) 100 MG 24 HR TABLET] Take 100 mg by mouth daily.       multivit,calc,mins/folic acid (ONE-A-DAY PROACTIVE 65 PLUS ORAL) [MULTIVIT,CALC,MINS/FOLIC ACID (ONE-A-DAY PROACTIVE  "65 PLUS ORAL)] Take 1 tablet by mouth daily.       tamsulosin (FLOMAX) 0.4 mg Cp24 [TAMSULOSIN (FLOMAX) 0.4 MG CP24] 0.4 mg Daily after breakfast.   5     warfarin (COUMADIN) 5 MG tablet [WARFARIN (COUMADIN) 5 MG TABLET] Take 5mg daiy except on Tuesdays take 1/2 tab (2.5mg) - Take as directed       enoxaparin ANTICOAGULANT (LOVENOX) 80 MG/0.8ML syringe Inject 0.7 mLs (70 mg) Subcutaneous every 12 hours Before and after procedure. Continue after procedure until INR >= 2 (Patient not taking: Reported on 9/23/2021) 12.8 mL 1    Allergies   Allergen Reactions     Atenolol Unknown     Blood-Group Specific Substance      Anti-Jka present. Please allow up to 3 HRS for blood for transfusion. Draw 2 EDTA for all Type and Screen orders.     Buprenorphine Hcl [Buprenorphine] Other (See Comments)     pt. felt \"loopy\"     Codeine Unknown     Penicillins Unknown     Sulfa (Sulfonamide Antibiotics) [Sulfa Drugs] Unknown     Vancomycin Other (See Comments)     \"shuts down kidneys. Destroying venin\" per wife.         Lab Results    Chemistry/lipid CBC Cardiac Enzymes/BNP/TSH/INR   Recent Labs   Lab Test 07/30/21  0852   CHOL 128   HDL 28*   LDL 74   TRIG 129     Recent Labs   Lab Test 07/30/21  0852   LDL 74     Recent Labs   Lab Test 07/30/21  0852      POTASSIUM 4.2   CHLORIDE 106   CO2 27      BUN 31*   CR 1.09   GFRESTIMATED 66   MARK 10.3     Recent Labs   Lab Test 07/30/21  0852 07/14/21  1403 06/08/21  1546   CR 1.09 1.16 1.25     No results for input(s): A1C in the last 19684 hours. Recent Labs   Lab Test 07/30/21  0852   WBC 6.1   HGB 12.3*   HCT 37.3*   MCV 95   *     Recent Labs   Lab Test 07/30/21  0852 06/08/21  1546 05/17/18  1733   HGB 12.3* 12.4* 14.0    No results for input(s): TROPONINI in the last 54988 hours.  Recent Labs   Lab Test 07/14/21  1403 06/08/21  1546   BNP  --  1,264*   NTBNP 7,991*  --      No results for input(s): TSH in the last 79462 hours.  Recent Labs   Lab Test 07/30/21  0851 " 07/07/21  1224 06/08/21  1546   INR 1.21* 2.14* 2.84*        53 minutes spent on the date of encounter doing education, consent signing, chart prep/review, review of test results, interpretation with above tests, patient visit, documentation and discussion with family.        This note has been dictated using voice recognition software. Any grammatical or context distortions are unintentional and inherent to the software.            Thank you for allowing me to participate in the care of your patient.      Sincerely,     Smita Matos PA-C     [facility]  cc:   No referring provider defined for this encounter.

## 2021-09-23 NOTE — PATIENT INSTRUCTIONS
Sorin Weeks,    It was a pleasure to see you today in the clinic regarding your upcoming MitraClip procedure.     My recommendations after this visit include:     - report to the [facility] on Oct 4 at the instructed time      If you have questions or concerns, please call using the numbers below:    Valve Clinic Pool  [phone]    After Hours/Scheduling  [phone]    Otherwise you can dial the nurse directly at:    Lenin Aguilera RN  Valve clinic coordinator  [phone]

## 2021-09-27 DIAGNOSIS — I34.0 MITRAL REGURGITATION: Primary | ICD-10-CM

## 2021-09-30 ENCOUNTER — LAB (OUTPATIENT)
Dept: LAB | Facility: CLINIC | Age: 76
End: 2021-09-30
Payer: MEDICARE

## 2021-09-30 DIAGNOSIS — Z11.59 ENCOUNTER FOR SCREENING FOR OTHER VIRAL DISEASES: ICD-10-CM

## 2021-09-30 PROCEDURE — U0003 INFECTIOUS AGENT DETECTION BY NUCLEIC ACID (DNA OR RNA); SEVERE ACUTE RESPIRATORY SYNDROME CORONAVIRUS 2 (SARS-COV-2) (CORONAVIRUS DISEASE [COVID-19]), AMPLIFIED PROBE TECHNIQUE, MAKING USE OF HIGH THROUGHPUT TECHNOLOGIES AS DESCRIBED BY CMS-2020-01-R: HCPCS

## 2021-09-30 PROCEDURE — U0005 INFEC AGEN DETEC AMPLI PROBE: HCPCS

## 2021-10-01 ENCOUNTER — LAB (OUTPATIENT)
Dept: CARDIOLOGY | Facility: CLINIC | Age: 76
End: 2021-10-01
Payer: MEDICARE

## 2021-10-01 ENCOUNTER — PREP FOR PROCEDURE (OUTPATIENT)
Dept: CARDIOLOGY | Facility: CLINIC | Age: 76
End: 2021-10-01

## 2021-10-01 DIAGNOSIS — I34.0 MITRAL REGURGITATION: ICD-10-CM

## 2021-10-01 DIAGNOSIS — I34.0 SEVERE MITRAL REGURGITATION: Primary | ICD-10-CM

## 2021-10-01 LAB
ABO/RH(D): NORMAL
ANTIBODY SCREEN: NEGATIVE
SARS-COV-2 RNA RESP QL NAA+PROBE: NEGATIVE
SPECIMEN EXPIRATION DATE: NORMAL

## 2021-10-01 PROCEDURE — 36415 COLL VENOUS BLD VENIPUNCTURE: CPT

## 2021-10-01 PROCEDURE — 86850 RBC ANTIBODY SCREEN: CPT

## 2021-10-01 PROCEDURE — 86901 BLOOD TYPING SEROLOGIC RH(D): CPT

## 2021-10-01 PROCEDURE — 86900 BLOOD TYPING SEROLOGIC ABO: CPT

## 2021-10-01 RX ORDER — CLINDAMYCIN PHOSPHATE 900 MG/50ML
900 INJECTION, SOLUTION INTRAVENOUS
Status: CANCELLED | OUTPATIENT
Start: 2021-10-01

## 2021-10-01 RX ORDER — SODIUM CHLORIDE 9 MG/ML
INJECTION, SOLUTION INTRAVENOUS CONTINUOUS
Status: CANCELLED | OUTPATIENT
Start: 2021-10-01

## 2021-10-01 RX ORDER — ASPIRIN 325 MG
325 TABLET ORAL ONCE
Status: CANCELLED | OUTPATIENT
Start: 2021-10-01 | End: 2021-10-01

## 2021-10-01 RX ORDER — LIDOCAINE 40 MG/G
CREAM TOPICAL
Status: CANCELLED | OUTPATIENT
Start: 2021-10-01

## 2021-10-03 ENCOUNTER — ANESTHESIA EVENT (OUTPATIENT)
Dept: CARDIOLOGY | Facility: CLINIC | Age: 76
DRG: 267 | End: 2021-10-03
Payer: MEDICARE

## 2021-10-04 ENCOUNTER — ANCILLARY PROCEDURE (OUTPATIENT)
Dept: CARDIOLOGY | Facility: CLINIC | Age: 76
DRG: 267 | End: 2021-10-04
Attending: INTERNAL MEDICINE
Payer: MEDICARE

## 2021-10-04 ENCOUNTER — HOSPITAL ENCOUNTER (INPATIENT)
Facility: CLINIC | Age: 76
LOS: 1 days | Discharge: HOME | DRG: 267 | End: 2021-10-05
Attending: INTERNAL MEDICINE | Admitting: INTERNAL MEDICINE
Payer: MEDICARE

## 2021-10-04 ENCOUNTER — HOSPITAL ENCOUNTER (OUTPATIENT)
Dept: CARDIOLOGY | Facility: CLINIC | Age: 76
DRG: 267 | End: 2021-10-04
Attending: INTERNAL MEDICINE | Admitting: INTERNAL MEDICINE
Payer: MEDICARE

## 2021-10-04 ENCOUNTER — ANESTHESIA (OUTPATIENT)
Dept: CARDIOLOGY | Facility: CLINIC | Age: 76
DRG: 267 | End: 2021-10-04
Payer: MEDICARE

## 2021-10-04 DIAGNOSIS — Z98.890 S/P MITRAL VALVE CLIP IMPLANTATION: Primary | ICD-10-CM

## 2021-10-04 DIAGNOSIS — I44.2 ATRIOVENTRICULAR BLOCK, COMPLETE (H): ICD-10-CM

## 2021-10-04 DIAGNOSIS — Z95.818 S/P MITRAL VALVE CLIP IMPLANTATION: Primary | ICD-10-CM

## 2021-10-04 DIAGNOSIS — Z95.2 S/P AVR (AORTIC VALVE REPLACEMENT): ICD-10-CM

## 2021-10-04 DIAGNOSIS — I34.0 SEVERE MITRAL REGURGITATION: ICD-10-CM

## 2021-10-04 DIAGNOSIS — I48.21 PERMANENT ATRIAL FIBRILLATION (H): ICD-10-CM

## 2021-10-04 DIAGNOSIS — I44.2 ATRIOVENTRICULAR BLOCK, COMPLETE (H): Primary | ICD-10-CM

## 2021-10-04 DIAGNOSIS — Z45.02 ENCOUNTER FOR ADJUSTMENT AND MANAGEMENT OF AUTOMATIC IMPLANTABLE CARDIAC DEFIBRILLATOR: ICD-10-CM

## 2021-10-04 LAB
ABO/RH(D): NORMAL
ACT BLD: 203 SECONDS (ref 74–150)
ACT BLD: 227 SECONDS (ref 74–150)
ACT BLD: 239 SECONDS (ref 74–150)
ACT BLD: 267 SECONDS (ref 74–150)
ACT BLD: 271 SECONDS (ref 74–150)
ACT BLD: 304 SECONDS (ref 74–150)
ALBUMIN SERPL-MCNC: 3 G/DL (ref 3.4–5)
ALBUMIN SERPL-MCNC: 3.4 G/DL (ref 3.4–5)
ALP SERPL-CCNC: 50 U/L (ref 40–150)
ALP SERPL-CCNC: 55 U/L (ref 40–150)
ALT SERPL W P-5'-P-CCNC: 18 U/L (ref 0–70)
ALT SERPL W P-5'-P-CCNC: 22 U/L (ref 0–70)
ANION GAP SERPL CALCULATED.3IONS-SCNC: 4 MMOL/L (ref 3–14)
ANION GAP SERPL CALCULATED.3IONS-SCNC: 6 MMOL/L (ref 3–14)
ANTIBODY SCREEN: NEGATIVE
AST SERPL W P-5'-P-CCNC: 14 U/L (ref 0–45)
AST SERPL W P-5'-P-CCNC: 8 U/L (ref 0–45)
BASOPHILS # BLD AUTO: 0 10E3/UL (ref 0–0.2)
BASOPHILS NFR BLD AUTO: 1 %
BILIRUB SERPL-MCNC: 1.1 MG/DL (ref 0.2–1.3)
BILIRUB SERPL-MCNC: 1.2 MG/DL (ref 0.2–1.3)
BUN SERPL-MCNC: 37 MG/DL (ref 7–30)
BUN SERPL-MCNC: 38 MG/DL (ref 7–30)
CALCIUM SERPL-MCNC: 10 MG/DL (ref 8.5–10.1)
CALCIUM SERPL-MCNC: 9.4 MG/DL (ref 8.5–10.1)
CHLORIDE BLD-SCNC: 109 MMOL/L (ref 94–109)
CHLORIDE BLD-SCNC: 110 MMOL/L (ref 94–109)
CO2 SERPL-SCNC: 24 MMOL/L (ref 20–32)
CO2 SERPL-SCNC: 26 MMOL/L (ref 20–32)
CREAT SERPL-MCNC: 1.09 MG/DL (ref 0.66–1.25)
CREAT SERPL-MCNC: 1.19 MG/DL (ref 0.66–1.25)
EOSINOPHIL # BLD AUTO: 0.1 10E3/UL (ref 0–0.7)
EOSINOPHIL NFR BLD AUTO: 1 %
ERYTHROCYTE [DISTWIDTH] IN BLOOD BY AUTOMATED COUNT: 15.7 % (ref 10–15)
ERYTHROCYTE [DISTWIDTH] IN BLOOD BY AUTOMATED COUNT: 15.9 % (ref 10–15)
GFR SERPL CREATININE-BSD FRML MDRD: 59 ML/MIN/1.73M2
GFR SERPL CREATININE-BSD FRML MDRD: 66 ML/MIN/1.73M2
GLUCOSE BLD-MCNC: 162 MG/DL (ref 70–99)
GLUCOSE BLD-MCNC: 169 MG/DL (ref 70–99)
GLUCOSE BLDC GLUCOMTR-MCNC: 132 MG/DL (ref 70–99)
GLUCOSE BLDC GLUCOMTR-MCNC: 143 MG/DL (ref 70–99)
GLUCOSE BLDC GLUCOMTR-MCNC: 174 MG/DL (ref 70–99)
GLUCOSE BLDC GLUCOMTR-MCNC: 177 MG/DL (ref 70–99)
GLUCOSE BLDC GLUCOMTR-MCNC: 205 MG/DL (ref 70–99)
HBA1C MFR BLD: 6.3 % (ref 0–5.6)
HCT VFR BLD AUTO: 33.3 % (ref 40–53)
HCT VFR BLD AUTO: 37.4 % (ref 40–53)
HGB BLD-MCNC: 11 G/DL (ref 13.3–17.7)
HGB BLD-MCNC: 12.3 G/DL (ref 13.3–17.7)
IMM GRANULOCYTES # BLD: 0 10E3/UL
IMM GRANULOCYTES NFR BLD: 0 %
INR PPP: 1.13 (ref 0.85–1.15)
LVEF ECHO: NORMAL
LYMPHOCYTES # BLD AUTO: 0.8 10E3/UL (ref 0.8–5.3)
LYMPHOCYTES NFR BLD AUTO: 14 %
MAGNESIUM SERPL-MCNC: 1.9 MG/DL (ref 1.6–2.3)
MCH RBC QN AUTO: 32.4 PG (ref 26.5–33)
MCH RBC QN AUTO: 32.4 PG (ref 26.5–33)
MCHC RBC AUTO-ENTMCNC: 32.9 G/DL (ref 31.5–36.5)
MCHC RBC AUTO-ENTMCNC: 33 G/DL (ref 31.5–36.5)
MCV RBC AUTO: 98 FL (ref 78–100)
MCV RBC AUTO: 98 FL (ref 78–100)
MONOCYTES # BLD AUTO: 0.8 10E3/UL (ref 0–1.3)
MONOCYTES NFR BLD AUTO: 13 %
NEUTROPHILS # BLD AUTO: 4.2 10E3/UL (ref 1.6–8.3)
NEUTROPHILS NFR BLD AUTO: 71 %
NRBC # BLD AUTO: 0 10E3/UL
NRBC BLD AUTO-RTO: 0 /100
PLATELET # BLD AUTO: 102 10E3/UL (ref 150–450)
PLATELET # BLD AUTO: 120 10E3/UL (ref 150–450)
POTASSIUM BLD-SCNC: 3.8 MMOL/L (ref 3.4–5.3)
PROT SERPL-MCNC: 6.4 G/DL (ref 6.8–8.8)
PROT SERPL-MCNC: 7.2 G/DL (ref 6.8–8.8)
RBC # BLD AUTO: 3.4 10E6/UL (ref 4.4–5.9)
RBC # BLD AUTO: 3.8 10E6/UL (ref 4.4–5.9)
SODIUM SERPL-SCNC: 139 MMOL/L (ref 133–144)
SODIUM SERPL-SCNC: 140 MMOL/L (ref 133–144)
SPECIMEN EXPIRATION DATE: NORMAL
WBC # BLD AUTO: 5.2 10E3/UL (ref 4–11)
WBC # BLD AUTO: 5.9 10E3/UL (ref 4–11)

## 2021-10-04 PROCEDURE — 93355 ECHO TRANSESOPHAGEAL (TEE): CPT

## 2021-10-04 PROCEDURE — 93355 ECHO TRANSESOPHAGEAL (TEE): CPT | Performed by: INTERNAL MEDICINE

## 2021-10-04 PROCEDURE — 85347 COAGULATION TIME ACTIVATED: CPT

## 2021-10-04 PROCEDURE — 85025 COMPLETE CBC W/AUTO DIFF WBC: CPT | Performed by: INTERNAL MEDICINE

## 2021-10-04 PROCEDURE — 258N000003 HC RX IP 258 OP 636: Performed by: NURSE ANESTHETIST, CERTIFIED REGISTERED

## 2021-10-04 PROCEDURE — 86922 COMPATIBILITY TEST ANTIGLOB: CPT | Performed by: INTERNAL MEDICINE

## 2021-10-04 PROCEDURE — 214N000001 HC R&B CCU UMMC

## 2021-10-04 PROCEDURE — 83735 ASSAY OF MAGNESIUM: CPT | Performed by: INTERNAL MEDICINE

## 2021-10-04 PROCEDURE — 93286 PERI-PX EVAL PM/LDLS PM IP: CPT | Mod: 26 | Performed by: INTERNAL MEDICINE

## 2021-10-04 PROCEDURE — 84075 ASSAY ALKALINE PHOSPHATASE: CPT | Performed by: INTERNAL MEDICINE

## 2021-10-04 PROCEDURE — 999N000015 HC STATISTIC ARTERIAL MONITORING DAILY

## 2021-10-04 PROCEDURE — C1894 INTRO/SHEATH, NON-LASER: HCPCS | Performed by: INTERNAL MEDICINE

## 2021-10-04 PROCEDURE — 93010 ELECTROCARDIOGRAM REPORT: CPT | Performed by: INTERNAL MEDICINE

## 2021-10-04 PROCEDURE — 36415 COLL VENOUS BLD VENIPUNCTURE: CPT | Performed by: INTERNAL MEDICINE

## 2021-10-04 PROCEDURE — 258N000003 HC RX IP 258 OP 636: Performed by: INTERNAL MEDICINE

## 2021-10-04 PROCEDURE — 02UG3JZ SUPPLEMENT MITRAL VALVE WITH SYNTHETIC SUBSTITUTE, PERCUTANEOUS APPROACH: ICD-10-PCS | Performed by: INTERNAL MEDICINE

## 2021-10-04 PROCEDURE — 272N000001 HC OR GENERAL SUPPLY STERILE: Performed by: INTERNAL MEDICINE

## 2021-10-04 PROCEDURE — 33418 REPAIR TCAT MITRAL VALVE: CPT | Mod: Q0 | Performed by: INTERNAL MEDICINE

## 2021-10-04 PROCEDURE — 33419 REPAIR TCAT MITRAL VALVE: CPT | Mod: Q0 | Performed by: INTERNAL MEDICINE

## 2021-10-04 PROCEDURE — 250N000013 HC RX MED GY IP 250 OP 250 PS 637: Performed by: INTERNAL MEDICINE

## 2021-10-04 PROCEDURE — 272N000002 HC OR SUPPLY OTHER OPNP: Performed by: INTERNAL MEDICINE

## 2021-10-04 PROCEDURE — 370N000017 HC ANESTHESIA TECHNICAL FEE, PER MIN: Performed by: INTERNAL MEDICINE

## 2021-10-04 PROCEDURE — 82962 GLUCOSE BLOOD TEST: CPT

## 2021-10-04 PROCEDURE — 250N000009 HC RX 250: Performed by: NURSE ANESTHETIST, CERTIFIED REGISTERED

## 2021-10-04 PROCEDURE — 93005 ELECTROCARDIOGRAM TRACING: CPT

## 2021-10-04 PROCEDURE — 83036 HEMOGLOBIN GLYCOSYLATED A1C: CPT | Performed by: INTERNAL MEDICINE

## 2021-10-04 PROCEDURE — 278N000051 HC OR IMPLANT GENERAL: Performed by: INTERNAL MEDICINE

## 2021-10-04 PROCEDURE — C1769 GUIDE WIRE: HCPCS | Performed by: INTERNAL MEDICINE

## 2021-10-04 PROCEDURE — 33419 REPAIR TCAT MITRAL VALVE: CPT | Performed by: INTERNAL MEDICINE

## 2021-10-04 PROCEDURE — 85027 COMPLETE CBC AUTOMATED: CPT | Performed by: INTERNAL MEDICINE

## 2021-10-04 PROCEDURE — 999N000157 HC STATISTIC RCP TIME EA 10 MIN

## 2021-10-04 PROCEDURE — 250N000013 HC RX MED GY IP 250 OP 250 PS 637: Performed by: PHYSICIAN ASSISTANT

## 2021-10-04 PROCEDURE — 93286 PERI-PX EVAL PM/LDLS PM IP: CPT

## 2021-10-04 PROCEDURE — C1887 CATHETER, GUIDING: HCPCS | Performed by: INTERNAL MEDICINE

## 2021-10-04 PROCEDURE — 250N000011 HC RX IP 250 OP 636: Performed by: INTERNAL MEDICINE

## 2021-10-04 PROCEDURE — C1760 CLOSURE DEV, VASC: HCPCS | Performed by: INTERNAL MEDICINE

## 2021-10-04 PROCEDURE — 84155 ASSAY OF PROTEIN SERUM: CPT | Performed by: INTERNAL MEDICINE

## 2021-10-04 PROCEDURE — 85610 PROTHROMBIN TIME: CPT | Performed by: INTERNAL MEDICINE

## 2021-10-04 PROCEDURE — 250N000011 HC RX IP 250 OP 636: Performed by: NURSE ANESTHETIST, CERTIFIED REGISTERED

## 2021-10-04 PROCEDURE — 86900 BLOOD TYPING SEROLOGIC ABO: CPT | Performed by: INTERNAL MEDICINE

## 2021-10-04 PROCEDURE — 84450 TRANSFERASE (AST) (SGOT): CPT | Performed by: INTERNAL MEDICINE

## 2021-10-04 DEVICE — IMPLANTABLE DEVICE: Type: IMPLANTABLE DEVICE | Site: HEART | Status: FUNCTIONAL

## 2021-10-04 DEVICE — IMPLANTABLE DEVICE: Type: IMPLANTABLE DEVICE | Status: FUNCTIONAL

## 2021-10-04 RX ORDER — ONDANSETRON 2 MG/ML
4 INJECTION INTRAMUSCULAR; INTRAVENOUS EVERY 6 HOURS PRN
Status: DISCONTINUED | OUTPATIENT
Start: 2021-10-04 | End: 2021-10-05 | Stop reason: HOSPADM

## 2021-10-04 RX ORDER — LISINOPRIL AND HYDROCHLOROTHIAZIDE 12.5; 2 MG/1; MG/1
1 TABLET ORAL DAILY
Status: DISCONTINUED | OUTPATIENT
Start: 2021-10-05 | End: 2021-10-05 | Stop reason: HOSPADM

## 2021-10-04 RX ORDER — FENTANYL CITRATE 50 UG/ML
25 INJECTION, SOLUTION INTRAMUSCULAR; INTRAVENOUS
Status: DISCONTINUED | OUTPATIENT
Start: 2021-10-04 | End: 2021-10-05 | Stop reason: HOSPADM

## 2021-10-04 RX ORDER — NALOXONE HYDROCHLORIDE 0.4 MG/ML
0.4 INJECTION, SOLUTION INTRAMUSCULAR; INTRAVENOUS; SUBCUTANEOUS
Status: ACTIVE | OUTPATIENT
Start: 2021-10-04 | End: 2021-10-04

## 2021-10-04 RX ORDER — METOPROLOL SUCCINATE 100 MG/1
100 TABLET, EXTENDED RELEASE ORAL DAILY
Status: DISCONTINUED | OUTPATIENT
Start: 2021-10-05 | End: 2021-10-05 | Stop reason: HOSPADM

## 2021-10-04 RX ORDER — FINASTERIDE 5 MG/1
5 TABLET, FILM COATED ORAL DAILY
Status: DISCONTINUED | OUTPATIENT
Start: 2021-10-05 | End: 2021-10-05 | Stop reason: HOSPADM

## 2021-10-04 RX ORDER — LIDOCAINE HYDROCHLORIDE 20 MG/ML
INJECTION, SOLUTION INFILTRATION; PERINEURAL PRN
Status: DISCONTINUED | OUTPATIENT
Start: 2021-10-04 | End: 2021-10-04

## 2021-10-04 RX ORDER — NALOXONE HYDROCHLORIDE 0.4 MG/ML
0.4 INJECTION, SOLUTION INTRAMUSCULAR; INTRAVENOUS; SUBCUTANEOUS
Status: DISCONTINUED | OUTPATIENT
Start: 2021-10-04 | End: 2021-10-04

## 2021-10-04 RX ORDER — ASPIRIN 325 MG
325 TABLET ORAL ONCE
Status: COMPLETED | OUTPATIENT
Start: 2021-10-04 | End: 2021-10-04

## 2021-10-04 RX ORDER — NICOTINE POLACRILEX 4 MG
15-30 LOZENGE BUCCAL
Status: DISCONTINUED | OUTPATIENT
Start: 2021-10-04 | End: 2021-10-05 | Stop reason: HOSPADM

## 2021-10-04 RX ORDER — LIDOCAINE 4 G/G
2 PATCH TOPICAL
Status: DISCONTINUED | OUTPATIENT
Start: 2021-10-04 | End: 2021-10-05 | Stop reason: HOSPADM

## 2021-10-04 RX ORDER — NICOTINE POLACRILEX 4 MG
15-30 LOZENGE BUCCAL
Status: DISCONTINUED | OUTPATIENT
Start: 2021-10-04 | End: 2021-10-04

## 2021-10-04 RX ORDER — CLINDAMYCIN PHOSPHATE 900 MG/50ML
900 INJECTION, SOLUTION INTRAVENOUS
Status: COMPLETED | OUTPATIENT
Start: 2021-10-04 | End: 2021-10-04

## 2021-10-04 RX ORDER — ONDANSETRON 2 MG/ML
INJECTION INTRAMUSCULAR; INTRAVENOUS PRN
Status: DISCONTINUED | OUTPATIENT
Start: 2021-10-04 | End: 2021-10-04

## 2021-10-04 RX ORDER — SODIUM CHLORIDE 9 MG/ML
INJECTION, SOLUTION INTRAVENOUS CONTINUOUS
Status: ACTIVE | OUTPATIENT
Start: 2021-10-04 | End: 2021-10-04

## 2021-10-04 RX ORDER — SODIUM CHLORIDE 9 MG/ML
INJECTION, SOLUTION INTRAVENOUS CONTINUOUS
Status: DISCONTINUED | OUTPATIENT
Start: 2021-10-04 | End: 2021-10-04

## 2021-10-04 RX ORDER — NALOXONE HYDROCHLORIDE 0.4 MG/ML
0.2 INJECTION, SOLUTION INTRAMUSCULAR; INTRAVENOUS; SUBCUTANEOUS
Status: ACTIVE | OUTPATIENT
Start: 2021-10-04 | End: 2021-10-04

## 2021-10-04 RX ORDER — FENTANYL CITRATE 50 UG/ML
INJECTION, SOLUTION INTRAMUSCULAR; INTRAVENOUS PRN
Status: DISCONTINUED | OUTPATIENT
Start: 2021-10-04 | End: 2021-10-04

## 2021-10-04 RX ORDER — LIDOCAINE 40 MG/G
CREAM TOPICAL
Status: DISCONTINUED | OUTPATIENT
Start: 2021-10-04 | End: 2021-10-04 | Stop reason: HOSPADM

## 2021-10-04 RX ORDER — DEXTROSE MONOHYDRATE 25 G/50ML
25-50 INJECTION, SOLUTION INTRAVENOUS
Status: DISCONTINUED | OUTPATIENT
Start: 2021-10-04 | End: 2021-10-05 | Stop reason: HOSPADM

## 2021-10-04 RX ORDER — ALBUTEROL SULFATE 0.83 MG/ML
2.5 SOLUTION RESPIRATORY (INHALATION) EVERY 4 HOURS PRN
Status: DISCONTINUED | OUTPATIENT
Start: 2021-10-04 | End: 2021-10-04 | Stop reason: HOSPADM

## 2021-10-04 RX ORDER — FENTANYL CITRATE 50 UG/ML
25 INJECTION, SOLUTION INTRAMUSCULAR; INTRAVENOUS
Status: DISCONTINUED | OUTPATIENT
Start: 2021-10-04 | End: 2021-10-04 | Stop reason: HOSPADM

## 2021-10-04 RX ORDER — ASPIRIN 81 MG/1
81 TABLET, CHEWABLE ORAL DAILY
Status: DISCONTINUED | OUTPATIENT
Start: 2021-10-05 | End: 2021-10-04

## 2021-10-04 RX ORDER — ONDANSETRON 4 MG/1
4 TABLET, ORALLY DISINTEGRATING ORAL EVERY 6 HOURS PRN
Status: DISCONTINUED | OUTPATIENT
Start: 2021-10-04 | End: 2021-10-05 | Stop reason: HOSPADM

## 2021-10-04 RX ORDER — ACETAMINOPHEN 325 MG/1
650 TABLET ORAL EVERY 4 HOURS PRN
Status: DISCONTINUED | OUTPATIENT
Start: 2021-10-04 | End: 2021-10-05 | Stop reason: HOSPADM

## 2021-10-04 RX ORDER — HEPARIN SODIUM 1000 [USP'U]/ML
INJECTION, SOLUTION INTRAVENOUS; SUBCUTANEOUS PRN
Status: DISCONTINUED | OUTPATIENT
Start: 2021-10-04 | End: 2021-10-04

## 2021-10-04 RX ORDER — HYDRALAZINE HYDROCHLORIDE 20 MG/ML
10 INJECTION INTRAMUSCULAR; INTRAVENOUS
Status: DISCONTINUED | OUTPATIENT
Start: 2021-10-04 | End: 2021-10-05 | Stop reason: HOSPADM

## 2021-10-04 RX ORDER — BENZONATATE 100 MG/1
100 CAPSULE ORAL 3 TIMES DAILY PRN
Status: DISCONTINUED | OUTPATIENT
Start: 2021-10-04 | End: 2021-10-05 | Stop reason: HOSPADM

## 2021-10-04 RX ORDER — PROPOFOL 10 MG/ML
INJECTION, EMULSION INTRAVENOUS PRN
Status: DISCONTINUED | OUTPATIENT
Start: 2021-10-04 | End: 2021-10-04

## 2021-10-04 RX ORDER — PROTAMINE SULFATE 10 MG/ML
INJECTION, SOLUTION INTRAVENOUS PRN
Status: DISCONTINUED | OUTPATIENT
Start: 2021-10-04 | End: 2021-10-04

## 2021-10-04 RX ORDER — SODIUM CHLORIDE, SODIUM LACTATE, POTASSIUM CHLORIDE, CALCIUM CHLORIDE 600; 310; 30; 20 MG/100ML; MG/100ML; MG/100ML; MG/100ML
INJECTION, SOLUTION INTRAVENOUS CONTINUOUS PRN
Status: DISCONTINUED | OUTPATIENT
Start: 2021-10-04 | End: 2021-10-04

## 2021-10-04 RX ORDER — NALOXONE HYDROCHLORIDE 0.4 MG/ML
0.2 INJECTION, SOLUTION INTRAMUSCULAR; INTRAVENOUS; SUBCUTANEOUS
Status: DISCONTINUED | OUTPATIENT
Start: 2021-10-04 | End: 2021-10-04

## 2021-10-04 RX ORDER — CHLORHEXIDINE GLUCONATE ORAL RINSE 1.2 MG/ML
15 SOLUTION DENTAL 2 TIMES DAILY
Status: DISCONTINUED | OUTPATIENT
Start: 2021-10-04 | End: 2021-10-05 | Stop reason: HOSPADM

## 2021-10-04 RX ORDER — FLUMAZENIL 0.1 MG/ML
0.2 INJECTION, SOLUTION INTRAVENOUS
Status: ACTIVE | OUTPATIENT
Start: 2021-10-04 | End: 2021-10-04

## 2021-10-04 RX ORDER — ONDANSETRON 2 MG/ML
4 INJECTION INTRAMUSCULAR; INTRAVENOUS EVERY 30 MIN PRN
Status: DISCONTINUED | OUTPATIENT
Start: 2021-10-04 | End: 2021-10-04 | Stop reason: HOSPADM

## 2021-10-04 RX ORDER — DEXTROSE MONOHYDRATE 25 G/50ML
25-50 INJECTION, SOLUTION INTRAVENOUS
Status: DISCONTINUED | OUTPATIENT
Start: 2021-10-04 | End: 2021-10-04

## 2021-10-04 RX ORDER — ATROPINE SULFATE 0.1 MG/ML
0.5 INJECTION INTRAVENOUS
Status: ACTIVE | OUTPATIENT
Start: 2021-10-04 | End: 2021-10-04

## 2021-10-04 RX ORDER — CLINDAMYCIN PHOSPHATE 900 MG/50ML
900 INJECTION, SOLUTION INTRAVENOUS EVERY 8 HOURS
Status: COMPLETED | OUTPATIENT
Start: 2021-10-04 | End: 2021-10-05

## 2021-10-04 RX ORDER — MEPERIDINE HYDROCHLORIDE 25 MG/ML
12.5 INJECTION INTRAMUSCULAR; INTRAVENOUS; SUBCUTANEOUS
Status: DISCONTINUED | OUTPATIENT
Start: 2021-10-04 | End: 2021-10-04 | Stop reason: HOSPADM

## 2021-10-04 RX ORDER — SODIUM CHLORIDE, SODIUM LACTATE, POTASSIUM CHLORIDE, CALCIUM CHLORIDE 600; 310; 30; 20 MG/100ML; MG/100ML; MG/100ML; MG/100ML
INJECTION, SOLUTION INTRAVENOUS CONTINUOUS
Status: DISCONTINUED | OUTPATIENT
Start: 2021-10-04 | End: 2021-10-04 | Stop reason: HOSPADM

## 2021-10-04 RX ORDER — ASPIRIN 81 MG/1
81 TABLET, CHEWABLE ORAL DAILY
Status: DISCONTINUED | OUTPATIENT
Start: 2021-10-05 | End: 2021-10-05 | Stop reason: HOSPADM

## 2021-10-04 RX ORDER — TAMSULOSIN HYDROCHLORIDE 0.4 MG/1
0.4 CAPSULE ORAL
Status: DISCONTINUED | OUTPATIENT
Start: 2021-10-05 | End: 2021-10-05 | Stop reason: HOSPADM

## 2021-10-04 RX ORDER — FENTANYL CITRATE 50 UG/ML
25 INJECTION, SOLUTION INTRAMUSCULAR; INTRAVENOUS EVERY 5 MIN PRN
Status: DISCONTINUED | OUTPATIENT
Start: 2021-10-04 | End: 2021-10-04 | Stop reason: HOSPADM

## 2021-10-04 RX ORDER — WARFARIN SODIUM 5 MG/1
5 TABLET ORAL
Status: COMPLETED | OUTPATIENT
Start: 2021-10-04 | End: 2021-10-04

## 2021-10-04 RX ORDER — NITROGLYCERIN 0.4 MG/1
0.4 TABLET SUBLINGUAL EVERY 5 MIN PRN
Status: DISCONTINUED | OUTPATIENT
Start: 2021-10-04 | End: 2021-10-05 | Stop reason: HOSPADM

## 2021-10-04 RX ORDER — LIDOCAINE 40 MG/G
CREAM TOPICAL
Status: DISCONTINUED | OUTPATIENT
Start: 2021-10-04 | End: 2021-10-05 | Stop reason: HOSPADM

## 2021-10-04 RX ORDER — FUROSEMIDE 20 MG
20 TABLET ORAL 2 TIMES DAILY
Status: DISCONTINUED | OUTPATIENT
Start: 2021-10-04 | End: 2021-10-05 | Stop reason: HOSPADM

## 2021-10-04 RX ORDER — ONDANSETRON 4 MG/1
4 TABLET, ORALLY DISINTEGRATING ORAL EVERY 30 MIN PRN
Status: DISCONTINUED | OUTPATIENT
Start: 2021-10-04 | End: 2021-10-04 | Stop reason: HOSPADM

## 2021-10-04 RX ADMIN — FENTANYL CITRATE 100 MCG: 50 INJECTION, SOLUTION INTRAMUSCULAR; INTRAVENOUS at 08:04

## 2021-10-04 RX ADMIN — LIDOCAINE 2 PATCH: 246 PATCH TOPICAL at 17:20

## 2021-10-04 RX ADMIN — Medication 4000 UNITS: at 09:10

## 2021-10-04 RX ADMIN — ONDANSETRON 4 MG: 2 INJECTION INTRAMUSCULAR; INTRAVENOUS at 10:11

## 2021-10-04 RX ADMIN — EPINEPHRINE 5 MCG: 1 INJECTION PARENTERAL at 08:59

## 2021-10-04 RX ADMIN — EPINEPHRINE 5 MCG: 1 INJECTION PARENTERAL at 08:29

## 2021-10-04 RX ADMIN — ROCURONIUM BROMIDE 20 MG: 50 INJECTION, SOLUTION INTRAVENOUS at 09:53

## 2021-10-04 RX ADMIN — EPINEPHRINE 5 MCG: 1 INJECTION PARENTERAL at 08:20

## 2021-10-04 RX ADMIN — ACETAMINOPHEN 650 MG: 325 TABLET, FILM COATED ORAL at 22:47

## 2021-10-04 RX ADMIN — SODIUM CHLORIDE: 9 INJECTION, SOLUTION INTRAVENOUS at 12:04

## 2021-10-04 RX ADMIN — Medication 4000 UNITS: at 09:36

## 2021-10-04 RX ADMIN — CLINDAMYCIN IN 5 PERCENT DEXTROSE 900 MG: 18 INJECTION, SOLUTION INTRAVENOUS at 22:48

## 2021-10-04 RX ADMIN — ROCURONIUM BROMIDE 45 MG: 50 INJECTION, SOLUTION INTRAVENOUS at 08:07

## 2021-10-04 RX ADMIN — Medication 4000 UNITS: at 08:58

## 2021-10-04 RX ADMIN — CLINDAMYCIN IN 5 PERCENT DEXTROSE 900 MG: 18 INJECTION, SOLUTION INTRAVENOUS at 17:22

## 2021-10-04 RX ADMIN — ASPIRIN 325 MG ORAL TABLET 325 MG: 325 PILL ORAL at 07:23

## 2021-10-04 RX ADMIN — SODIUM CHLORIDE, POTASSIUM CHLORIDE, SODIUM LACTATE AND CALCIUM CHLORIDE: 600; 310; 30; 20 INJECTION, SOLUTION INTRAVENOUS at 07:57

## 2021-10-04 RX ADMIN — LIDOCAINE HYDROCHLORIDE 100 MG: 20 INJECTION, SOLUTION INFILTRATION; PERINEURAL at 08:04

## 2021-10-04 RX ADMIN — ACETAMINOPHEN 650 MG: 325 TABLET, FILM COATED ORAL at 19:03

## 2021-10-04 RX ADMIN — Medication 3000 UNITS: at 09:24

## 2021-10-04 RX ADMIN — Medication 2000 UNITS: at 09:48

## 2021-10-04 RX ADMIN — EPINEPHRINE 5 MCG: 1 INJECTION PARENTERAL at 08:53

## 2021-10-04 RX ADMIN — PROTAMINE SULFATE 10 MG: 10 INJECTION, SOLUTION INTRAVENOUS at 10:08

## 2021-10-04 RX ADMIN — WARFARIN SODIUM 5 MG: 5 TABLET ORAL at 19:03

## 2021-10-04 RX ADMIN — CLINDAMYCIN PHOSPHATE 900 MG: 900 INJECTION, SOLUTION INTRAVENOUS at 08:23

## 2021-10-04 RX ADMIN — ROCURONIUM BROMIDE 5 MG: 50 INJECTION, SOLUTION INTRAVENOUS at 08:04

## 2021-10-04 RX ADMIN — SODIUM CHLORIDE, POTASSIUM CHLORIDE, SODIUM LACTATE AND CALCIUM CHLORIDE: 600; 310; 30; 20 INJECTION, SOLUTION INTRAVENOUS at 10:21

## 2021-10-04 RX ADMIN — FENTANYL CITRATE 50 MCG: 50 INJECTION, SOLUTION INTRAMUSCULAR; INTRAVENOUS at 08:39

## 2021-10-04 RX ADMIN — SUGAMMADEX 400 MG: 100 INJECTION, SOLUTION INTRAVENOUS at 10:13

## 2021-10-04 RX ADMIN — ROCURONIUM BROMIDE 30 MG: 50 INJECTION, SOLUTION INTRAVENOUS at 08:47

## 2021-10-04 RX ADMIN — ROCURONIUM BROMIDE 20 MG: 50 INJECTION, SOLUTION INTRAVENOUS at 08:29

## 2021-10-04 RX ADMIN — Medication 10000 UNITS: at 08:48

## 2021-10-04 RX ADMIN — PROPOFOL 150 MG: 10 INJECTION, EMULSION INTRAVENOUS at 08:05

## 2021-10-04 RX ADMIN — FUROSEMIDE 20 MG: 20 TABLET ORAL at 20:05

## 2021-10-04 RX ADMIN — CHLORHEXIDINE GLUCONATE 0.12% ORAL RINSE 15 ML: 1.2 LIQUID ORAL at 20:05

## 2021-10-04 ASSESSMENT — MIFFLIN-ST. JEOR: SCORE: 1547.88

## 2021-10-04 ASSESSMENT — NEW YORK HEART ASSOCIATION (NYHA) CLASSIFICATION: NYHA FUNCTIONAL CLASS: IV

## 2021-10-04 ASSESSMENT — ACTIVITIES OF DAILY LIVING (ADL)
ADLS_ACUITY_SCORE: 12
ADLS_ACUITY_SCORE: 12

## 2021-10-04 NOTE — ANESTHESIA PROCEDURE NOTES
Airway       Patient location during procedure: OR       Procedure Start/Stop Times: 10/4/2021 8:12 AM  Staff -        CRNA: Antoine Galloway APRN CRNA       Other Anesthesia Staff: Jorge Melton       Performed By: SRNA  Consent for Airway        Urgency: elective  Indications and Patient Condition       Indications for airway management: -procedural       Induction type:intravenous       Mask difficulty assessment: 1 - vent by mask    Final Airway Details       Final airway type: endotracheal airway       Successful airway: ETT - single  Endotracheal Airway Details        ETT size (mm): 7.5       Cuffed: yes       Successful intubation technique: direct laryngoscopy       DL Blade Type: [place] 2       Grade View of Cords: 1 (cords open and clear)       Adjucts: stylet       Position: Right       Measured from: lips       Secured at (cm): 23       Bite block used: None    Post intubation assessment        Placement verified by: capnometry, equal breath sounds and chest rise        Number of attempts at approach: 1       Number of other approaches attempted: 0       Secured with: pink tape       Ease of procedure: easy       Dentition: Intact and Unchanged

## 2021-10-04 NOTE — ANESTHESIA PROCEDURE NOTES
Arterial Line Procedure Note    Pre-Procedure   Staff -        Anesthesiologist:  Geoffrey Lilly MD       Performed By: anesthesiologist       Location: pre-op       Procedure Start/Stop Times: 10/4/2021 7:34 AM and 10/4/2021 7:39 AM       Pre-Anesthestic Checklist: patient identified, IV checked, risks and benefits discussed, informed consent, monitors and equipment checked, pre-op evaluation and at physician/surgeon's request  Timeout:       Correct Patient: Yes        Correct Procedure: Yes        Correct Site: Yes        Correct Position: Yes   Procedure   Procedure: arterial line       Diagnosis: heart failure       Laterality: left       Insertion Site: radial.  Sterile Prep        Standard elements of sterile barrier followed       Skin prep: Chloraprep  Insertion/Injection        Technique: Seldinger Technique        Catheter Type/Size: 20 G, 1.75 in/4.5 cm quick cath (integral wire)  Narrative        Tegaderm dressing used.       Complications: None apparent,        Arterial waveform: Yes        IBP within 10% of NIBP: Yes

## 2021-10-04 NOTE — ANESTHESIA PREPROCEDURE EVALUATION
"Anesthesia Pre-Procedure Evaluation    Patient: Sorin Weeks   MRN: [id number] : 1945        Preoperative Diagnosis: valvular disease   Procedure : Procedure(s):  CV MITRAL CLIP     Past Medical History:   Diagnosis Date     Diabetes mellitus (H)      Hypertension       Past Surgical History:   Procedure Laterality Date     APPENDECTOMY       C REPLACE AORT VALV,PROSTH VALV      Description: Aortic Valve Replacement;  Recorded: 2013;     CARDIAC CATHETERIZATION       CV CORONARY ANGIOGRAM N/A 2021    Procedure: Coronary Angiogram;  Surgeon: Leonel Romero MD;  Location: [facility] CATH LAB CV     CV LEFT HEART CATH N/A 2021    Procedure: Left Heart Cath;  Surgeon: Leonel Romero MD;  Location: [facility] CATH LAB CV     CV RIGHT HEART CATH MEASUREMENTS RECORDED N/A 2021    Procedure: Right Heart Cath;  Surgeon: Leonel Romero MD;  Location: [facility] CATH LAB CV     CV SUPRAVALVULAR AORTOGRAM N/A 2021    Procedure: Supravalvular Aortagram;  Surgeon: Leonel Romero MD;  Location: [facility] CATH LAB CV     INSERT / REPLACE / REMOVE PACEMAKER       TOTAL SHOULDER REPLACEMENT Left       Allergies   Allergen Reactions     Blood Transfusion Related (Informational Only) Other (See Comments)     Patient has a history of a clinically significant antibody against RBC antigens.  A delay in compatible RBCs may occur.      Atenolol Unknown     Blood-Group Specific Substance      Anti-Jka present. Please allow up to 3 HRS for blood for transfusion. Draw 2 EDTA for all Type and Screen orders.     Buprenorphine Hcl [Buprenorphine] Other (See Comments)     pt. felt \"loopy\"     Codeine Unknown     Penicillins Unknown     Sulfa (Sulfonamide Antibiotics) [Sulfa Drugs] Unknown     Vancomycin Other (See Comments)     \"shuts down kidneys. Destroying venin\" per wife.      Social History     Tobacco Use     Smoking status: Former Smoker     Types: Cigarettes     Smokeless tobacco: Never Used "   Substance Use Topics     Alcohol use: Never      Wt Readings from Last 1 Encounters:   10/04/21 88.6 kg (195 lb 5.2 oz)        Anesthesia Evaluation   Pt has had prior anesthetic. Type: MAC and General.    No history of anesthetic complications       ROS/MED HX  ENT/Pulmonary:     (+) sleep apnea,     Neurologic:  - neg neurologic ROS     Cardiovascular: Comment: +recent PM interrogation    (+) hypertension--CAD ---CHF etiology: valvular heart disease NYHA classification: IV. JOEL. pacemaker, - Patient is dependent on pacemaker. valvular problems/murmurs type: AI and MR TR. pulmonary hypertension, Previous cardiac testing     METS/Exercise Tolerance: 1 - Eating, dressing    Hematologic:       Musculoskeletal:       GI/Hepatic:  - neg GI/hepatic ROS     Renal/Genitourinary:  - neg Renal ROS     Endo:     (+) type II DM, Not using insulin, - not using insulin pump. not previously admitted for DM/DKA. Obesity,     Psychiatric/Substance Use:  - neg psychiatric ROS     Infectious Disease:  - neg infectious disease ROS     Malignancy:  - neg malignancy ROS     Other:            Physical Exam    Airway        Mallampati: II   TM distance: > 3 FB   Neck ROM: full   Mouth opening: > 3 cm    Respiratory Devices and Support         Dental     Comment: Multiple missing teeth  Denies loose teeth or removable hardware        Cardiovascular          Rhythm and rate: regular and normal   (+) peripheral edema and murmur   (-) carotid bruit is not present    Pulmonary           breath sounds clear to auscultation           OUTSIDE LABS:  CBC:   Lab Results   Component Value Date    WBC 6.1 09/23/2021    WBC 6.1 07/30/2021    HGB 12.4 (L) 09/23/2021    HGB 12.3 (L) 07/30/2021    HCT 38.6 (L) 09/23/2021    HCT 37.3 (L) 07/30/2021     (L) 09/23/2021     (L) 07/30/2021     BMP:   Lab Results   Component Value Date     09/23/2021     07/30/2021    POTASSIUM 4.3 09/23/2021    POTASSIUM 4.2 07/30/2021    CHLORIDE  108 (H) 09/23/2021    CHLORIDE 106 07/30/2021    CO2 27 09/23/2021    CO2 27 07/30/2021    BUN 36 (H) 09/23/2021    BUN 31 (H) 07/30/2021    CR 1.33 (H) 09/23/2021    CR 1.09 07/30/2021     (H) 10/04/2021     (H) 09/23/2021     COAGS:   Lab Results   Component Value Date    INR 2.43 (H) 09/23/2021     POC: No results found for: BGM, HCG, HCGS  HEPATIC:   Lab Results   Component Value Date    ALBUMIN 3.9 09/23/2021    PROTTOTAL 7.2 09/23/2021    ALT 15 09/23/2021    AST 15 09/23/2021    ALKPHOS 67 09/23/2021    BILITOTAL 1.1 (H) 09/23/2021     OTHER:   Lab Results   Component Value Date    PH 7.44 07/30/2021    MARK 10.4 09/23/2021    MAG 2.1 09/23/2021       Anesthesia Plan    ASA Status:  4   NPO Status:  NPO Appropriate    Anesthesia Type: General.     - Airway: ETT   Induction: Intravenous.   Maintenance: Inhalation.   Techniques and Equipment:     - Lines/Monitors: 2nd IV, Arterial Line, BIS     Consents    Anesthesia Plan(s) and associated risks, benefits, and realistic alternatives discussed. Questions answered and patient/representative(s) expressed understanding.     - Discussed with:  Patient      - Extended Intubation/Ventilatory Support Discussed: No.      - Patient is DNR/DNI Status: No    Use of blood products discussed: Yes.     - Discussed with: Patient.     - Consented: consented to blood products            Reason for refusal: other.     Postoperative Care    Pain management: IV analgesics.   PONV prophylaxis: Ondansetron (or other 5HT-3)     Comments:    Patient seen and examined.  Risks, benefits and alternatives to GETA discussed.  Questions answered and patient wishes to proceed              Geoffrey Lilly MD

## 2021-10-04 NOTE — H&P
[facility] History and Physicial  Sorin Weeks MRN: [id number]  1945  Date of Admission:10/4/2021  Primary care provider: Joseph Qeuvedo      Assessment and Plan:     Sorin Weesk is a 75 year old male with a past medical history significant for HTN, DM2, BPH, OA, DARIO, hx of mechanical AVR, CAD with  of OM2, and severe mitral regurgitation who presented for mitral clip procedure with the [facility] team.     #Severe MR s/p clip x2  #Mechanical AVR c/b PVL with mild-moderate AI  #Right groin hematoma  Procedure completed without immediate complication. Post op noted to have right groin hematoma which mostly resolved with pressure.   - admit to tele  - bed rest per protocol  - cardiac rehab consult  - TTE in AM  - pharmacy consult for warfarin. INR goal 2-3. Hold on additional bridging for now given hematoma    #T2DM  - hold PTA glipizide and metformin. Resume at discharge  - medium SSI while inpatient    #HTN  #CAD with known  of OM2 with collateral per angiogram 7/30/2021  Not on aspirin or statin at baseline. Will plan to start aspirin at least while patient is subtherapeutic with his INR. Recommend discuss with primary cardiologist.  - recommend high intensity statin given known CAD  - start aspirin 81mg daily at least until INR >2.0  - Resume PTA lisino-hydrochlorothiazide 20-12.5mg daily    #BPH  - resume PTA flomax and proscar    #Dental caries  Previously recommended for multiple teeth extraction and denture placement. Patient refused 2/2 cost concerns. Will defer further management to outpatient team.    FEN: low sodium  Disposition: Admission to [facility].   Code Status: FULL CODE           Chief Complaint:   Short of breath         History of Present Illness:   Sorin Weeks is a 75 year old male with a past medical history significant for HTN, DM2, BPH, OA, DARIO, hx of mechanical AVR, CAD with  of OM2, and severe mitral regurgitation who presented  for mitral clip procedure with the [facility] structural team. Per clinic notation, patient has been complaining of worsening LE edema, as well as worsening dyspnea on exertion. He noted that he gets short of breath with walking the dog or walking up a slight incline. He was ultimately referred for JULI which confirmed severe TR which appeared amenable to mitral clip. He is now s/p clip x2, with reduction of MR from 4+ -->1-2+. Procedure was completed without immediate complication, and groin access sites were closed at the end of the procedure.     Patient visited on the floor post operatively. He feels well. Endorsing some mild low back and hip pain that is typical for him when he lays flat. Denies dyspnea, chest pain, dizziness, headache, nausea. Did have a hematoma form in right groin which is mostly resolved s/p pressure held by RN.         Review of Systems:    10 point review of systems negative except for stated above in HPI.          Past Medical History:   Medical History reviewed.   Past Medical History:   Diagnosis Date     Diabetes mellitus (H)      Hypertension              Past Surgical History:   Surgical History reviewed.   Past Surgical History:   Procedure Laterality Date     APPENDECTOMY       C REPLACE AORT VALV,PROSTH VALV      Description: Aortic Valve Replacement;  Recorded: 06/05/2013;     CARDIAC CATHETERIZATION       CV CORONARY ANGIOGRAM N/A 7/30/2021    Procedure: Coronary Angiogram;  Surgeon: Leonel Romero MD;  Location: [facility] CV     CV LEFT HEART CATH N/A 7/30/2021    Procedure: Left Heart Cath;  Surgeon: Leonel Romero MD;  Location: [facility] LAB      CV RIGHT HEART CATH MEASUREMENTS RECORDED N/A 7/30/2021    Procedure: Right Heart Cath;  Surgeon: Leonel Romero MD;  Location: [facility]     CV SUPRAVALVULAR AORTOGRAM N/A 7/30/2021    Procedure: Supravalvular Aortagram;  Surgeon: Leonel Romero MD;  Location: [facility] CATH LAB CV      "INSERT / REPLACE / REMOVE PACEMAKER       TOTAL SHOULDER REPLACEMENT Left              Social History:   Social History reviewed.  Social History     Tobacco Use     Smoking status: Former Smoker     Types: Cigarettes     Smokeless tobacco: Never Used   Substance Use Topics     Alcohol use: Never             Family History:   Family History reviewed.   History reviewed. No pertinent family history.          Allergies:     Allergies   Allergen Reactions     Blood Transfusion Related (Informational Only) Other (See Comments)     Patient has a history of a clinically significant antibody against RBC antigens.  A delay in compatible RBCs may occur.      Atenolol Unknown     Blood-Group Specific Substance      Anti-Jka present. Please allow up to 3 HRS for blood for transfusion. Draw 2 EDTA for all Type and Screen orders.     Buprenorphine Hcl [Buprenorphine] Other (See Comments)     pt. felt \"loopy\"     Codeine Unknown     Penicillins Unknown     Sulfa (Sulfonamide Antibiotics) [Sulfa Drugs] Unknown     Vancomycin Other (See Comments)     \"shuts down kidneys. Destroying venin\" per wife.             Medications:   Medications Reviewed.   Current Facility-Administered Medications   Medication     albuterol (PROVENTIL) neb solution 2.5 mg     EPINEPHrine (ADRENALIN) 5 mg in sodium chloride 0.9 % 250 mL infusion     fentaNYL (PF) (SUBLIMAZE) injection 25 mcg     fentaNYL (PF) (SUBLIMAZE) injection 25 mcg     Hold metformin (GLUCOPHAGE), alogliptin/metformin (KAZANO), glipizide/metformin (METAGLIP), glyburide/metformin (GLUCOVANCE), rosiglitazone/metformin (AVANDAMET) and sitagliptin/metformin (JANUMET)     Hold: warfarin (COUMADIN) pre procedure     lactated ringers infusion     lidocaine (LMX4) cream     lidocaine 1 % 0.1-1 mL     meperidine (DEMEROL) injection 12.5 mg     ondansetron (ZOFRAN-ODT) ODT tab 4 mg    Or     ondansetron (ZOFRAN) injection 4 mg     Patient is NOT allergic to contrast dye OR was given " "pre-procedure oral steroids for treatment     sodium chloride (PF) 0.9% PF flush 3 mL     sodium chloride (PF) 0.9% PF flush 3 mL     sodium chloride (PF) 0.9% PF flush 3 mL     sodium chloride (PF) 0.9% PF flush 3 mL     sodium chloride 0.9% infusion     sodium chloride 0.9% infusion     Facility-Administered Medications Ordered in Other Encounters   Medication     EPINEPHRINE BOLUS 100 MCG/10 ML ANESTHESIA                              [id number]     fentaNYL (PF) (SUBLIMAZE) injection     heparin (porcine) injection     lactated ringers infusion     lidocaine 2% injection (MDV)     ondansetron (ZOFRAN) injection     propofol (DIPRIVAN) injection 10 mg/mL vial     protamine injection     rocuronium injection     sugammadex (BRIDION) injection             Physical Exam:   Vitals were reviewed.  Blood pressure (!) 144/56, pulse 61, temperature (!) 96.1  F (35.6  C), temperature source Skin, resp. rate 16, height 1.651 m (5' 5\"), weight 88.6 kg (195 lb 5.2 oz), SpO2 100 %.    General: AAOx3, NAD  Skin: Not jaundiced, no rash, no ecchymoses. Scarring with white escar over sternum  HEENT: MMM, PERRLA, EOM intact. Poor dentition  CV: RRR, normal S1S2, no murmur, clicks, rubs  Resp: Limited due to bed rest, but seemingly Clear to auscultation bilaterally, no wheezes, rhonchi  Abd: Soft, non-tender, BS+, no masses appreciated  Extremities: warm and well perfused, palpable pulses, no edema. Right groin with 2cm hematoma medial to access site. Soft, non-tender, distal pulse intact and brisk  Neuro: No lateralizing symptoms or focal neurologic deficits        Labs:   Routine Labs:  No results found for: TROPI, TROPONIN, TROPR, TROPN  CMP  Recent Labs   Lab 10/04/21  1040 10/04/21  0748 10/04/21  0639   NA  --  139  --    POTASSIUM  --  3.8  --    CHLORIDE  --  109  --    CO2  --  24  --    ANIONGAP  --  6  --    * 169* 143*   BUN  --  38*  --    CR  --  1.09  --    GFRESTIMATED  --  66  --    MARK  --  10.0  --  "   PROTTOTAL  --  7.2  --    ALBUMIN  --  3.4  --    BILITOTAL  --  1.2  --    ALKPHOS  --  55  --    AST  --  14  --    ALT  --  22  --      CBC  Recent Labs   Lab 10/04/21  0748   WBC 5.9   RBC 3.80*   HGB 12.3*   HCT 37.4*   MCV 98   MCH 32.4   MCHC 32.9   RDW 15.7*   *     INR  Recent Labs   Lab 10/04/21  0748   INR 1.13           Diagnostics:      Echo result w/o MOPS:          Physician Attestation   I, Toni Williamson, have reviewed and discussed with the advanced practice provider their history, physical and plan for Sorin Weeks. I did not participate in a shared visit by interviewing or examining the patient and this should be billed as an advanced practice provider only visit.    Toni Williamson

## 2021-10-04 NOTE — DISCHARGE INSTRUCTIONS
Patient Instructions - Going Home after Mitral Valve Repair with MITRACLIP:    Going Home: It s normal to feel a little anxious after leaving the hospital and when fewer people are nearby. People do much better when they feel as though they have support- if you live alone we suggest you arrange to have someone stay with you for a day or two to help you recover.   Medications:  Take all of your medications as directed in your discharge summary. Do not stop taking these medications without speaking with your cardiologist. If you have any questions about any of your medications, speak to your pharmacist or provider.     Follow up Appointments    You will follow up with Smita Matos PA-C on October 18 at 12:50 pm.    You will have a repeat echocardiogram on November 12 at 10 am.    You will have a follow up with Dr. Romero on November 19 at 8:30 am.  If you need to reschedule any of these appointments, please call:  [phone]      See your regular cardiologist in 6 months.  Your regular heart doctor will continue to be your heart specialist.     See your family doctor in 2 weeks.  Make an appointment once you get home.    Site Care: Shower every day- let the water run over your incision or access site, but do not rub the area. No tub baths, pools or hot-tubs for the 1st week you are at home. Do not put ointments, powders, or lotions on your access site and/or incision.   It is normal to feel a small lump the size of a marble in the groin access site.  That is the closure device used to close the vein.  If you are experiencing discomfort, redness or increasing swelling, please call us.  Dental Work: Avoid major dental work for the next 6 months if possible. . You will need antibiotics before dental work or surgery. This would decrease the risk of infection.   Driving:  You should not drive for the first 5 days after your procedure. The first time you drive, you must have someone with you. You may  ride in a car.   Activity: People recover at different rates depending on their general health and the type of heart valve procedure. Most people take a few weeks to feel fully recovered. Daily activity and exercise are an important part of your recovery.  Do not lift, push or pull anything > 10 lb. for the first 5 days.    CALL THE VALVE CLINIC IF YOU HAVE ANY QUESTIONS OR CONCERNS:  [phone]    For the first 5 days after your valve procedure   Do Not:     Lift, push, or pull more than ten pounds (including pets, laundry, groceries, etc)    Garden, including lawn mowing and raking    Excessively bear down or strain when having a bowel movement     Ride a bike  Do:    Weigh yourself every day in the morning after using the bathroom.  If you notice weight gain of more than 3 pounds in 1 day or more than 5 pounds in 1 week, call the cardiology clinic.     Get up and get dressed every day. Do not stay in bed.  Set a daily routine.     Walk as much as you can. You may walk up and down stairs. When you are tired, rest.     Cough and deep breathe. Use your incentive spirometer 5-10 times each hour when you are awake.     We strongly recommend you participate in a cardiac rehabilitation program. This type of program will help you learn about your heart health, prevent more heart problems, participate in safe and heart-healthy activities, and learn how to return to your activities of daily living and hobbies.     Let the cardiology clinic know if you need to leave town before your first follow-up visit.     When to call the Cardiology Clinic to speak with a nurse?     Swelling of the legs, ankles, or feet    Increasing shortness of breath    Change in the color or temperature of your lower legs and feet    Abdominal pain or unrelieved nausea and/or vomiting    Redness and warmth around your access site and/or incision that does not go away    Yellow or green drainage from your access site and/or incision   Weight gain  of 3 pounds in one day or 5 pounds in one week    Develop any numbness, tingling, or limited movement of your arms or legs.     Chest pain that does not go away    New confusion or you cannot think clearly    Fever and chills         [facility]:  [phone]  If you are calling after hours, please listen to the entire voicemail, a live  will answer at the end of the message

## 2021-10-04 NOTE — ANESTHESIA CARE TRANSFER NOTE
Patient: Sorin Weeks    Procedure(s):  CV MITRAL CLIP    Diagnosis: valvular disease  Diagnosis Additional Information: No value filed.    Anesthesia Type:   General     Note:    Oropharynx: oropharynx clear of all foreign objects  Level of Consciousness: awake  Oxygen Supplementation: face mask  Level of Supplemental Oxygen (L/min / FiO2): 6  Independent Airway: airway patency satisfactory and stable  Dentition: dentition unchanged  Vital Signs Stable: post-procedure vital signs reviewed and stable  Report to RN Given: handoff report given  Patient transferred to: PACU    Handoff Report: Identifed the Patient, Identified the Reponsible Provider, Reviewed the pertinent medical history, Discussed the surgical course, Reviewed Intra-OP anesthesia mangement and issues during anesthesia, Set expectations for post-procedure period and Allowed opportunity for questions and acknowledgement of understanding      Vitals:  Vitals Value Taken Time   BP     Temp     Pulse     Resp     SpO2         Electronically Signed By: SHIV Clark CRNA  October 4, 2021  10:31 AM

## 2021-10-04 NOTE — ANESTHESIA POSTPROCEDURE EVALUATION
Patient: Sorin Weeks    Procedure: Procedure(s):  CV MITRAL CLIP       Diagnosis:valvular disease  Diagnosis Additional Information: No value filed.    Anesthesia Type:  General    Note:  Disposition: Admission   Postop Pain Control: Uneventful            Sign Out: Well controlled pain   PONV: No   Neuro/Psych: Uneventful            Sign Out: Acceptable/Baseline neuro status   Airway/Respiratory: Uneventful            Sign Out: Acceptable/Baseline resp. status   CV/Hemodynamics: Uneventful            Sign Out: Acceptable CV status; No obvious hypovolemia; No obvious fluid overload   Other NRE: NONE   DID A NON-ROUTINE EVENT OCCUR? No           Last vitals:  Vitals Value Taken Time   /61 10/04/21 1140   Temp 35.6  C (96.1  F) 10/04/21 1035   Pulse 60 10/04/21 1147   Resp 16 10/04/21 1130   SpO2 98 % 10/04/21 1147   Vitals shown include unvalidated device data.    Electronically Signed By: Geoffrey Lilly MD  October 4, 2021  11:49 AM

## 2021-10-04 NOTE — PROGRESS NOTES
"1400    Patient's came with hematoma on R groin site, this RN held pressure and that resolved it. Valdo OVERTON notified, and at bedside, instructed this RN to keep the client on bedrest one additional hour.     1818    Patient's  Groin site looks to have hematoma again, same as when he arrived to the unit, this RN and Charge nurse agreed to call CSI team to evaluate it , physician stated \" we're aware of client's hematoma, will come and evaluate him in about 10-15 minutes. Will continue to monitor patient.     Delvin Lopez RN on 10/4/2021 at 6:48 PM           "

## 2021-10-04 NOTE — PHARMACY-ANTICOAGULATION SERVICE
Clinical Pharmacy - Warfarin Dosing Consult     Pharmacy has been consulted to manage this patient s warfarin therapy.  Indication: Mechanical Aortic Valve Replacement  Therapy Goal: INR 2-3  OP Anticoag Clinic: [facility]  Warfarin Prior to Admission: Yes  Warfarin PTA Regimen: 2.5mg Tue, 5mg All other days of the week  Significant drug interactions: ASA 81,  Recent documented change in oral intake/nutrition: Unknown    INR   Date Value Ref Range Status   10/04/2021 1.13 0.85 - 1.15 Final     Comment:     Effective 7/11/2021, the reference range for this assay has changed.   09/23/2021 2.43 (H) 0.90 - 1.15 Final     Comment:     Effective 7/11/2021, the reference range for this assay has changed.       Recommend warfarin 5 mg today.  Pharmacy will monitor Sorin Weeks daily and order warfarin doses to achieve specified goal.      Please contact pharmacy as soon as possible if the warfarin needs to be held for a procedure or if the warfarin goals change.

## 2021-10-04 NOTE — PROGRESS NOTES
D: Sorin Weeks is a 75 year old male with a past medical history significant for HTN, DM2, BPH, OA, DARIO, hx of mechanical AVR, CAD with  of OM2, and severe mitral regurgitation who presented for mitral clip procedure with the [facility] team.      I: Monitored vitals and assessed pt status.        A: A0x4. VSS. Afebrile. Urinating adequately. Paced rhythm. Started warfarin this evening. R groin hematoma, L groin dressing saturated and marked.      P: Continue to monitor Pt status and report changes to treatment team.         Delvin Lopez RN on 10/4/2021 at 6:51 PM

## 2021-10-05 ENCOUNTER — APPOINTMENT (OUTPATIENT)
Dept: CARDIOLOGY | Facility: CLINIC | Age: 76
DRG: 267 | End: 2021-10-05
Attending: INTERNAL MEDICINE
Payer: MEDICARE

## 2021-10-05 ENCOUNTER — APPOINTMENT (OUTPATIENT)
Dept: GENERAL RADIOLOGY | Facility: CLINIC | Age: 76
DRG: 267 | End: 2021-10-05
Attending: INTERNAL MEDICINE
Payer: MEDICARE

## 2021-10-05 ENCOUNTER — APPOINTMENT (OUTPATIENT)
Dept: OCCUPATIONAL THERAPY | Facility: CLINIC | Age: 76
DRG: 267 | End: 2021-10-05
Attending: INTERNAL MEDICINE
Payer: MEDICARE

## 2021-10-05 VITALS
HEIGHT: 65 IN | BODY MASS INDEX: 32.87 KG/M2 | TEMPERATURE: 98.2 F | WEIGHT: 197.3 LBS | HEART RATE: 68 BPM | SYSTOLIC BLOOD PRESSURE: 130 MMHG | OXYGEN SATURATION: 95 % | DIASTOLIC BLOOD PRESSURE: 55 MMHG | RESPIRATION RATE: 16 BRPM

## 2021-10-05 LAB
ANION GAP SERPL CALCULATED.3IONS-SCNC: 8 MMOL/L (ref 3–14)
ATRIAL RATE - MUSE: 55 BPM
ATRIAL RATE - MUSE: 63 BPM
BUN SERPL-MCNC: 46 MG/DL (ref 7–30)
CALCIUM SERPL-MCNC: 9.2 MG/DL (ref 8.5–10.1)
CHLORIDE BLD-SCNC: 107 MMOL/L (ref 94–109)
CO2 SERPL-SCNC: 25 MMOL/L (ref 20–32)
CREAT SERPL-MCNC: 1.26 MG/DL (ref 0.66–1.25)
DIASTOLIC BLOOD PRESSURE - MUSE: NORMAL MMHG
DIASTOLIC BLOOD PRESSURE - MUSE: NORMAL MMHG
ERYTHROCYTE [DISTWIDTH] IN BLOOD BY AUTOMATED COUNT: 16 % (ref 10–15)
GFR SERPL CREATININE-BSD FRML MDRD: 55 ML/MIN/1.73M2
GLUCOSE BLD-MCNC: 149 MG/DL (ref 70–99)
GLUCOSE BLDC GLUCOMTR-MCNC: 150 MG/DL (ref 70–99)
GLUCOSE BLDC GLUCOMTR-MCNC: 185 MG/DL (ref 70–99)
HCT VFR BLD AUTO: 31.5 % (ref 40–53)
HGB BLD-MCNC: 10.2 G/DL (ref 13.3–17.7)
INR PPP: 1.24 (ref 0.85–1.15)
INTERPRETATION ECG - MUSE: NORMAL
INTERPRETATION ECG - MUSE: NORMAL
LVEF ECHO: NORMAL
MAGNESIUM SERPL-MCNC: 2.1 MG/DL (ref 1.6–2.3)
MCH RBC QN AUTO: 32.1 PG (ref 26.5–33)
MCHC RBC AUTO-ENTMCNC: 32.4 G/DL (ref 31.5–36.5)
MCV RBC AUTO: 99 FL (ref 78–100)
P AXIS - MUSE: NORMAL DEGREES
P AXIS - MUSE: NORMAL DEGREES
PLATELET # BLD AUTO: 113 10E3/UL (ref 150–450)
POTASSIUM BLD-SCNC: 3.9 MMOL/L (ref 3.4–5.3)
PR INTERVAL - MUSE: NORMAL MS
PR INTERVAL - MUSE: NORMAL MS
QRS DURATION - MUSE: 216 MS
QRS DURATION - MUSE: 220 MS
QT - MUSE: 516 MS
QT - MUSE: 542 MS
QTC - MUSE: 542 MS
QTC - MUSE: 557 MS
R AXIS - MUSE: -78 DEGREES
R AXIS - MUSE: -84 DEGREES
RBC # BLD AUTO: 3.18 10E6/UL (ref 4.4–5.9)
SODIUM SERPL-SCNC: 140 MMOL/L (ref 133–144)
SYSTOLIC BLOOD PRESSURE - MUSE: NORMAL MMHG
SYSTOLIC BLOOD PRESSURE - MUSE: NORMAL MMHG
T AXIS - MUSE: 79 DEGREES
T AXIS - MUSE: 91 DEGREES
VENTRICULAR RATE- MUSE: 60 BPM
VENTRICULAR RATE- MUSE: 70 BPM
WBC # BLD AUTO: 7 10E3/UL (ref 4–11)

## 2021-10-05 PROCEDURE — 36415 COLL VENOUS BLD VENIPUNCTURE: CPT | Performed by: INTERNAL MEDICINE

## 2021-10-05 PROCEDURE — 250N000013 HC RX MED GY IP 250 OP 250 PS 637: Performed by: INTERNAL MEDICINE

## 2021-10-05 PROCEDURE — 250N000011 HC RX IP 250 OP 636: Performed by: INTERNAL MEDICINE

## 2021-10-05 PROCEDURE — 93005 ELECTROCARDIOGRAM TRACING: CPT

## 2021-10-05 PROCEDURE — 99239 HOSP IP/OBS DSCHRG MGMT >30: CPT | Performed by: PHYSICIAN ASSISTANT

## 2021-10-05 PROCEDURE — 71045 X-RAY EXAM CHEST 1 VIEW: CPT

## 2021-10-05 PROCEDURE — 71045 X-RAY EXAM CHEST 1 VIEW: CPT | Mod: 26 | Performed by: RADIOLOGY

## 2021-10-05 PROCEDURE — 85610 PROTHROMBIN TIME: CPT | Performed by: INTERNAL MEDICINE

## 2021-10-05 PROCEDURE — 97165 OT EVAL LOW COMPLEX 30 MIN: CPT | Mod: GO

## 2021-10-05 PROCEDURE — 93010 ELECTROCARDIOGRAM REPORT: CPT | Performed by: INTERNAL MEDICINE

## 2021-10-05 PROCEDURE — 250N000012 HC RX MED GY IP 250 OP 636 PS 637: Performed by: PHYSICIAN ASSISTANT

## 2021-10-05 PROCEDURE — 85018 HEMOGLOBIN: CPT | Performed by: INTERNAL MEDICINE

## 2021-10-05 PROCEDURE — 80048 BASIC METABOLIC PNL TOTAL CA: CPT | Performed by: INTERNAL MEDICINE

## 2021-10-05 PROCEDURE — 93306 TTE W/DOPPLER COMPLETE: CPT

## 2021-10-05 PROCEDURE — 83735 ASSAY OF MAGNESIUM: CPT | Performed by: INTERNAL MEDICINE

## 2021-10-05 PROCEDURE — 97110 THERAPEUTIC EXERCISES: CPT | Mod: GO

## 2021-10-05 PROCEDURE — 93306 TTE W/DOPPLER COMPLETE: CPT | Mod: 26 | Performed by: INTERNAL MEDICINE

## 2021-10-05 PROCEDURE — 97535 SELF CARE MNGMENT TRAINING: CPT | Mod: GO

## 2021-10-05 RX ORDER — WARFARIN SODIUM 2.5 MG/1
2.5 TABLET ORAL
Status: DISCONTINUED | OUTPATIENT
Start: 2021-10-05 | End: 2021-10-05 | Stop reason: HOSPADM

## 2021-10-05 RX ORDER — ASPIRIN 81 MG/1
81 TABLET, CHEWABLE ORAL DAILY
Qty: 30 TABLET | Refills: 0 | Status: SHIPPED | OUTPATIENT
Start: 2021-10-06 | End: 2021-10-18

## 2021-10-05 RX ADMIN — METOPROLOL SUCCINATE 100 MG: 100 TABLET, EXTENDED RELEASE ORAL at 08:06

## 2021-10-05 RX ADMIN — TAMSULOSIN HYDROCHLORIDE 0.4 MG: 0.4 CAPSULE ORAL at 08:07

## 2021-10-05 RX ADMIN — LISINOPRIL AND HYDROCHLOROTHIAZIDE 1 TABLET: 12.5; 2 TABLET ORAL at 08:06

## 2021-10-05 RX ADMIN — INSULIN ASPART 1 UNITS: 100 INJECTION, SOLUTION INTRAVENOUS; SUBCUTANEOUS at 08:14

## 2021-10-05 RX ADMIN — ACETAMINOPHEN 650 MG: 325 TABLET, FILM COATED ORAL at 03:18

## 2021-10-05 RX ADMIN — FINASTERIDE 5 MG: 5 TABLET, FILM COATED ORAL at 08:07

## 2021-10-05 RX ADMIN — CHLORHEXIDINE GLUCONATE 0.12% ORAL RINSE 15 ML: 1.2 LIQUID ORAL at 08:06

## 2021-10-05 RX ADMIN — FUROSEMIDE 20 MG: 20 TABLET ORAL at 08:07

## 2021-10-05 RX ADMIN — CLINDAMYCIN IN 5 PERCENT DEXTROSE 900 MG: 18 INJECTION, SOLUTION INTRAVENOUS at 08:02

## 2021-10-05 RX ADMIN — ASPIRIN 81 MG CHEWABLE TABLET 81 MG: 81 TABLET CHEWABLE at 08:07

## 2021-10-05 ASSESSMENT — ACTIVITIES OF DAILY LIVING (ADL)
ADLS_ACUITY_SCORE: 12

## 2021-10-05 ASSESSMENT — MIFFLIN-ST. JEOR: SCORE: 1556.83

## 2021-10-05 NOTE — PLAN OF CARE
Occupational Therapy and Cardiac Rehab Discharge Summary    Reason for therapy discharge:    Discharged to home with outpatient therapy.    Progress towards therapy goal(s). See goals on Care Plan in [facility] electronic health record for goal details.  Goals partially met.  Barriers to achieving goals:   discharge on same date as initial evaluation.    Therapy recommendation(s):    Continued therapy is recommended.  Rationale/Recommendations:  Home with A and OP CR.

## 2021-10-05 NOTE — PROGRESS NOTES
DISCHARGE                             Discharged to: Home  Via: Automobile  Accompanied by: Family  Discharge Instructions: diet, activity, medications, follow up appointments, when to call the MD, aftercare instructions, and what to watchout for (i.e. s/s of infection, increasing SOB, palpitations, chest pain,)  Prescriptions: To be filled by       pharmacy per pt's request; medication list reviewed & sent with pt  Follow Up Appointments: arranged; information given  Belongings: All sent with pt  IV: out  Telemetry: off  Pt exhibits understanding of above discharge instructions; all questions answered.     Discharge Paperwork: Signed, copied, and sent home with patient.      Delvin Lopez RN on 10/5/2021 at 11:49 AM

## 2021-10-05 NOTE — CONSULTS
Care Management Follow Up    Length of Stay (days): 1    Expected Discharge Date: 10/05/2021    Anticipated Discharge Disposition: home with outpatient cardiac rehab; Home with assist      Additional Information:  Pt is a 75-year-old male admitted to [facility] 10/5/21 for mitral clip procedure. CM/SW auto consulted for discharge planning. OT evaluated pt and recommends home with outpatient cardiac rehab. No CM/SW needs identified at this time. CM/SW to follow for potential needs.    NEYMAR Mcdaniel, MaineGeneral Medical CenterSW  6C   [facility]  Pager [phone]  Phone [phone]

## 2021-10-05 NOTE — PROGRESS NOTES
10/05/21 0900   Quick Adds   Type of Visit Initial Occupational Therapy Evaluation   Living Environment   People in home significant other   Current Living Arrangements other (see comments)  ([facility])   Home Accessibility stairs within home   Number of Stairs, Main Entrance none   Stair Railings, Main Entrance railings on both sides of stairs   Number of Stairs, Within Home, Primary greater than 10 stairs   Stair Railings, Within Home, Primary railings on both sides of stairs   Transportation Anticipated family or friend will provide   Self-Care   Usual Activity Tolerance fair   Current Activity Tolerance fair   Regular Exercise No   Equipment Currently Used at Home grab bar, tub/shower;shower chair;walker, rolling;other (see comments)  (Sock aid)   Disability/Function   Hearing Difficulty or Deaf yes   Describe hearing loss bilateral hearing loss   Use of hearing assistive devices bilateral hearing aids   Wear Glasses or Blind yes   Vision Management Glasses   Difficulty Communicating no   Difficulty Eating/Swallowing no   Walking or Climbing Stairs Difficulty yes   Walking or Climbing Stairs   (L knee rhonda )   Dressing/Bathing Difficulty yes   Toileting issues no   Fall history within last six months no   Change in Functional Status Since Onset of Current Illness/Injury yes   General Information   Onset of Illness/Injury or Date of Surgery 10/04/21   Referring Physician Smita Matos PA-C   Patient/Family Therapy Goal Statement (OT) Return home   Additional Occupational Profile Info/Pertinent History of Current Problem Sorin Weeks is a 75 year old male with a past medical history significant for HTN, DM2, BPH, OA, DARIO, hx of mechanical AVR, CAD with  of OM2, and severe mitral regurgitation who presented for mitral clip procedure with the [facility] team.    Existing Precautions/Restrictions cardiac;fall;other (see comments)  (10 lb lifting restriction)   Left Upper Extremity  (Weight-bearing Status) full weight-bearing (FWB)   Right Upper Extremity (Weight-bearing Status) full weight-bearing (FWB)   Left Lower Extremity (Weight-bearing Status) full weight-bearing (FWB)   Right Lower Extremity (Weight-bearing Status) full weight-bearing (FWB)   Cognitive Status Examination   Orientation Status orientation to person, place and time   Visual Perception   Visual Impairment/Limitations WFL   Sensory   Sensory Quick Adds Other (describe)   Sensory Comments BLE neuropathy    Pain Assessment   Patient Currently in Pain No   Range of Motion Comprehensive   General Range of Motion bilateral upper extremity ROM WFL   Comment, General Range of Motion Previous L SH replacement   Strength Comprehensive (MMT)   Comment, General Manual Muscle Testing (MMT) Assessment WFL, generalized post op weakness.    Transfers   Transfers other (see comments)   Transfer Comments Pt required SBA/CGA for STS   Balance   Balance Assessment no deficits were identified   Clinical Impression   Criteria for Skilled Therapeutic Interventions Met (OT) yes;meets criteria;skilled treatment is necessary   OT Diagnosis Decreased tolerance for ADLs/IADLs and transfers   OT Problem List-Impairments impacting ADL problems related to;activity tolerance impaired;hearing;balance   Assessment of Occupational Performance 1-3 Performance Deficits   Identified Performance Deficits Decreased tolerance for ADLs/IADLS   Planned Therapy Interventions (OT) ADL retraining;IADL retraining;balance training;risk factor education   Clinical Decision Making Complexity (OT) low complexity   Therapy Frequency (OT) Daily   Predicted Duration of Therapy 1 week   Risk & Benefits of therapy have been explained evaluation/treatment results reviewed;care plan/treatment goals reviewed;patient;spouse/significant other   OT Discharge Planning    OT Discharge Recommendation (DC Rec) home with outpatient cardiac rehab;Home with assist   OT Rationale for DC Rec  If pt were to discharge home, home with assist and with outaptient cardiac rehab    OT Brief overview of current status  SBA/CGA   Total Evaluation Time (Minutes)   Total Evaluation Time (Minutes) 10

## 2021-10-05 NOTE — DISCHARGE SUMMARY
[address] [address]  p: [phone]    Discharge Summary: Cardiology Service    Sorin Weeks MRN# [id number]   YOB: 1945 Age: 75 year old       Admission Date: 10/4/2021  Discharge Date: 10/05/2021      Discharge Diagnoses:  1. Severe MR s/p clip x2  2. Hx of mechanical AVR c/b PVL and mild-moderate AI  3. T2DM  4. HTN  5. CAD with known  of OM2  6. BPH  7. Dental caries    Pertinent Procedures:  1. Mitral clip    Consults:  NA    Imaging with results:  Echocardiogram 10/5/2021:  Interpretation Summary  Left ventricular function is decreased. The ejection fraction is 50-55%  (borderline).  Right ventricular function is low-normal.  Post mitral clip placement x 2. There is moderate to severe mitral  regurgitation. The mean gradient across the valve is 5 mmHg.  A mechanical aortic valve is present. There is mild to moderate prosthetic  valvular and paravalvular regurgitation. P1/2 t = 381 ms. The mean gradient is  8 mmHg.  Dilation of the inferior vena cava is present with abnormal respiratory  variation in diameter.  No pericardial effusion is present.  ______________________________________________________________________________  Left Ventricle  Left ventricular size is normal. Left ventricular function is decreased. The  ejection fraction is 50-55% (borderline). Abnormal septal motion consistent  with left bundle branch block is present.     Right Ventricle  The right ventricle is normal size. Right ventricular function is low-normal.     Atria  Mild biatrial enlargement is present.     Mitral Valve  Post mitral clip placement x 2. There is moderate to severe mitral  regurgitation. The mean gradient across the valve is 5 mmHg.     Aortic Valve  A mechanical aortic valve is present. The peak velocity across the aortic  valve is 1.9 m/sec. The mean gradient is 8 mmHg. There is mild to moderate  prosthetic valvular and  paravalvular regurgitation. P1/2 t = 381 ms.     Tricuspid Valve  Mild tricuspid insufficiency is present. The right ventricular systolic  pressure is approximated at 37.4 mmHg plus the right atrial pressure.  Pulmonary artery systolic pressure is normal.     Pulmonic Valve  The pulmonic valve is normal.     Vessels  Dilation of the inferior vena cava is present with abnormal respiratory  variation in diameter.     Pericardium  No pericardial effusion is present.     Compared to Previous Study  This study was compared with the study from 10.4.21 . MR appears worse today  compared to the post procedural JULI. Patient is also hypervolemic.    Brief HPI:  Sorin Weeks is a 75 year old male with a past medical history significant for HTN, DM2, BPH, OA, DARIO, hx of mechanical AVR, CAD with  of OM2, and severe mitral regurgitation who presented for mitral clip procedure with the [facility] team.     Hospital Course by Diagnosis:   #Severe MR s/p clip x2  #Mechanical AVR c/b PVL with mild-moderate AI  #Right groin hematoma  Procedure completed without immediate complication. Post op noted to have right groin hematoma which mostly resolved with pressure. TTE as above. Will plan to resume lovenox bridging with INR check on 10/8/2021  - resume warfarin. INR check 10/8/2021  - resume lovenox bid until INR is therapeutic (patinet has supply at home)     #T2DM  - resume PTA glipizide and metformin tomorrow     #HTN  #CAD with known  of OM2 with collateral per angiogram 7/30/2021  Not on aspirin or statin at baseline. Will plan to start aspirin at least while patient is subtherapeutic with his INR. Recommend discuss with primary cardiologist.  - recommend high intensity statin given known CAD  - start aspirin 81mg daily at least until INR >2.0  - Resume PTA lisino-hydrochlorothiazide 20-12.5mg daily     #BPH  - resume PTA flomax and proscar     #Dental caries  Previously recommended for multiple teeth  extraction and denture placement. Patient refused 2/2 cost concerns. Will defer further management to outpatient team.     FEN: low sodium  Disposition: Admission to [facility].   Code Status: FULL CODE      Condition on discharge  Temp:  [96.1  F (35.6  C)-98.4  F (36.9  C)] 98.2  F (36.8  C)  Pulse:  [59-69] 68  Resp:  [14-16] 16  BP: (114-147)/(48-66) 130/55  MAP:  [83 mmHg-91 mmHg] 91 mmHg  Arterial Line BP: (137-148)/(51-55) 141/54  SpO2:  [94 %-100 %] 95 %  General: Alert, interactive, NAD  Eyes: sclera anicteric, EOMI  Cardiovascular: regular rate and rhythm, normal S1 and S2, no murmurs, gallops, or rubs  Resp: clear to auscultation bilaterally, no rales, wheezes, or rhonchi  GI: Soft, nontender, nondistended. +BS.  No HSM or masses, no rebound or guarding.  Extremities: 1+ bilateral lower extremity edema, no cyanosis or clubbing, dorsalis pedis and posterior tibialis pulses 2+ bilaterally. Groin site c/d/i with ecchymosis surrounding puncture site  Skin: Warm and dry, no jaundice or rash  Neuro: CN 2-12 intact, moves all extremities equally  Psych: Alert & oriented x 3      Medication Changes:    Discharge medications:   Discharge Medication List as of 10/5/2021 10:52 AM      START taking these medications    Details   aspirin (ASA) 81 MG chewable tablet Take 1 tablet (81 mg) by mouth daily Until your INR is greater than 2.0., Disp-30 tablet, R-0, E-Prescribe         CONTINUE these medications which have NOT CHANGED    Details   chlorhexidine (PERIDEX) 0.12 % solution Swish and spit 15 mLs in mouth 2 times daily, Disp-473 mL, R-0, E-Prescribe      enoxaparin ANTICOAGULANT (LOVENOX) 80 MG/0.8ML syringe Inject 0.7 mLs (70 mg) Subcutaneous every 12 hours Before and after procedure. Continue after procedure until INR >= 2, Disp-12.8 mL, R-1, E-Prescribe0.75 mg/kg Q 12 hrs per [facility] protocol for CrCl 30-60      finasteride (PROSCAR) 5 mg tablet [FINASTERIDE (PROSCAR) 5 MG TABLET] 5 mg. Take one tablet by  mouth daily., R-5, Historical      furosemide (LASIX) 20 MG tablet [FUROSEMIDE (LASIX) 20 MG TABLET] Take 20 mg by mouth 2 (two) times a day. 40 mg qam and 20 mg in the evening per pt, Historical      glipiZIDE (GLUCOTROL) 10 MG 24 hr tablet [GLIPIZIDE (GLUCOTROL) 10 MG 24 HR TABLET] Take 10 mg by mouth daily., Historical      lisinopril-hydrochlorothiazide (PRINZIDE,ZESTORETIC) 20-12.5 mg per tablet [LISINOPRIL-HYDROCHLOROTHIAZIDE (PRINZIDE,ZESTORETIC) 20-12.5 MG PER TABLET] Take 1 tablet by mouth daily., R-0, Historical      metFORMIN (GLUCOPHAGE) 500 MG tablet [METFORMIN (GLUCOPHAGE) 500 MG TABLET] Take 500 mg by mouth 2 (two) times a day., Historical      metoprolol succinate (TOPROL-XL) 100 MG 24 hr tablet [METOPROLOL SUCCINATE (TOPROL-XL) 100 MG 24 HR TABLET] Take 100 mg by mouth daily., Historical      multivit,calc,mins/folic acid (ONE-A-DAY PROACTIVE 65 PLUS ORAL) [MULTIVIT,CALC,MINS/FOLIC ACID (ONE-A-DAY PROACTIVE 65 PLUS ORAL)] Take 1 tablet by mouth daily., Historical      tamsulosin (FLOMAX) 0.4 mg Cp24 [TAMSULOSIN (FLOMAX) 0.4 MG CP24] 0.4 mg Daily after breakfast. , R-5, Historical      warfarin (COUMADIN) 5 MG tablet [WARFARIN (COUMADIN) 5 MG TABLET] Take 5mg daiy except on Tuesdays take 1/2 tab (2.5mg) - Take as directed, Historical             Labs or imaging requiring follow-up after discharge:  INR check 10/8/2021      Follow-up:  [facility] Valve team as recommended    Code status:  FULL    45 minutes spent in discharge, including >50% in counseling and coordination of care, medication review and plan of care recommended on follow up. Questions were answered.   It was our pleasure to care for Sorin Weeks during this hospitalization. Please do not hesitate to contact me should there be questions regarding the hospital course or discharge plan.          Braulio Campos PA-C  [facility] Cardiology Team

## 2021-10-05 NOTE — PLAN OF CARE
Vss.  Monitor shows paced rhythm with 0-19 pvcs, rare pvc couplet and triplet.  Neuro checks q 2 hours.  Intact.  Right groin ecchymotic, but soft.  Has stitch in that groin. Left groin dressing dry and intact.  Left radial site ecchymotic. 02@2l overnight as sats fell to high 80s while sleeping.  Denies cassandra. Scant blood tingd sputum on pms, but none tonight. Lungs diminished lower lobes. Fsg 185 during the night. Voids. Tylenol for chronic back and right hip pain.  Plans for echo today then ? Discharge with wife. Will monitor and notify md of changes or issues.

## 2021-10-05 NOTE — PHARMACY-ANTICOAGULATION SERVICE
Clinical Pharmacy- Warfarin Discharge Note  This patient is currently on warfarin for the treatment of Bioprosthetic heart valve.  INR Goal= 2-3  Expected length of therapy lifetime.    Warfarin PTA Regimen: 2.5mg Tue, 5mg All other days of the week      Anticoagulation Dose History     Recent Dosing and Labs Latest Ref Rng & Units 6/8/2021 7/7/2021 7/30/2021 9/23/2021 10/4/2021 10/5/2021    Warfarin 5 mg - - - - - 5 mg -    INR 0.85 - 1.15 2.84(H) 2.14(H) 1.21(H) 2.43(H) 1.13 1.24(H)          Vitamin K doses administered during the last 7 days: None  FFP administered during the last 7 days: None    A/P  1) Recommend discharging the patient on a warfarin regimen of 2.5 mg on Tuesdays and 5 mg all other days.    2) Patient was bridged with Lovenox prior to procedure will defer to procedural team on when and if it should resume. If resumed would bridge until INR >2.  3) The patient should have an INR checked as per their regular schedule.     Brigida De La O, PharmD.

## 2021-10-06 ENCOUNTER — PATIENT OUTREACH (OUTPATIENT)
Dept: CARE COORDINATION | Facility: CLINIC | Age: 76
End: 2021-10-06

## 2021-10-06 DIAGNOSIS — Z98.890 S/P MITRAL VALVE CLIP IMPLANTATION: Primary | ICD-10-CM

## 2021-10-06 DIAGNOSIS — Z95.818 S/P MITRAL VALVE CLIP IMPLANTATION: Primary | ICD-10-CM

## 2021-10-06 DIAGNOSIS — Z71.89 OTHER SPECIFIED COUNSELING: ICD-10-CM

## 2021-10-06 NOTE — PROGRESS NOTES
Clinic Care Coordination Contact    Background: Care Coordination referral placed from [facility] discharge report for reason of patient meeting criteria for a TCM outreach call by [facility] team.    Assessment: Upon chart review, [facility] Team member will cancel/close the referral for TCM outreach due to reason below:       Patient declined completing post hopsital discharge questions        Plan: Care Coordination referral for TCM outreach canceled.    Leah Sullivan MA  [facility] Resource [place], [facility]

## 2021-10-07 ENCOUNTER — TELEPHONE (OUTPATIENT)
Dept: CARDIOLOGY | Facility: CLINIC | Age: 76
End: 2021-10-07

## 2021-10-07 NOTE — TELEPHONE ENCOUNTER
"  Pt reports 2 inch skin tear \"L shaped\" on left scapular area. The area has a small amount of pink tinged oozing. Pt is currently on lovenox and warfarin. Pt denies inflammation and signs of infection in that area. Denies fever/Chills. They were sent home with a few packets of dressing from the hospital and will continue to use that as needed, but I did encourage them to try to keep the area open to air.     Follow-up as planned on 10/18 in Valve Clinic with Smita Matos PA-C. In the interim, pt will also schedule a follow-up with PCP. Wife will continue to monitor the site and will call with questions or concerns.   "

## 2021-10-15 LAB
MDC_IDC_LEAD_IMPLANT_DT: NORMAL
MDC_IDC_LEAD_IMPLANT_DT: NORMAL
MDC_IDC_LEAD_LOCATION: NORMAL
MDC_IDC_LEAD_LOCATION: NORMAL
MDC_IDC_LEAD_LOCATION_DETAIL_1: NORMAL
MDC_IDC_LEAD_LOCATION_DETAIL_1: NORMAL
MDC_IDC_LEAD_MFG: NORMAL
MDC_IDC_LEAD_MFG: NORMAL
MDC_IDC_LEAD_MODEL: NORMAL
MDC_IDC_LEAD_MODEL: NORMAL
MDC_IDC_LEAD_POLARITY_TYPE: NORMAL
MDC_IDC_LEAD_POLARITY_TYPE: NORMAL
MDC_IDC_LEAD_SERIAL: NORMAL
MDC_IDC_LEAD_SERIAL: NORMAL
MDC_IDC_MSMT_BATTERY_REMAINING_LONGEVITY: 73 MO
MDC_IDC_MSMT_BATTERY_REMAINING_LONGEVITY: 73 MO
MDC_IDC_MSMT_BATTERY_STATUS: NORMAL
MDC_IDC_MSMT_BATTERY_STATUS: NORMAL
MDC_IDC_MSMT_BATTERY_VOLTAGE: 2.89 V
MDC_IDC_MSMT_BATTERY_VOLTAGE: 2.89 V
MDC_IDC_MSMT_LEADCHNL_RV_IMPEDANCE_VALUE: 437.5 OHM
MDC_IDC_MSMT_LEADCHNL_RV_IMPEDANCE_VALUE: 450 OHM
MDC_IDC_MSMT_LEADCHNL_RV_IMPEDANCE_VALUE: 450 OHM
MDC_IDC_MSMT_LEADCHNL_RV_PACING_THRESHOLD_AMPLITUDE: 1 V
MDC_IDC_MSMT_LEADCHNL_RV_PACING_THRESHOLD_PULSEWIDTH: 0.6 MS
MDC_IDC_PG_IMPLANT_DTM: NORMAL
MDC_IDC_PG_IMPLANT_DTM: NORMAL
MDC_IDC_PG_MFG: NORMAL
MDC_IDC_PG_MFG: NORMAL
MDC_IDC_PG_MODEL: NORMAL
MDC_IDC_PG_MODEL: NORMAL
MDC_IDC_PG_SERIAL: NORMAL
MDC_IDC_PG_SERIAL: NORMAL
MDC_IDC_PG_TYPE: NORMAL
MDC_IDC_PG_TYPE: NORMAL
MDC_IDC_SESS_CLINIC_NAME: NORMAL
MDC_IDC_SESS_CLINIC_NAME: NORMAL
MDC_IDC_SESS_DTM: NORMAL
MDC_IDC_SESS_DTM: NORMAL
MDC_IDC_SESS_TYPE: NORMAL
MDC_IDC_SESS_TYPE: NORMAL
MDC_IDC_SET_BRADY_HYSTRATE: NORMAL
MDC_IDC_SET_BRADY_HYSTRATE: NORMAL
MDC_IDC_SET_BRADY_LOWRATE: 60 {BEATS}/MIN
MDC_IDC_SET_BRADY_LOWRATE: 60 {BEATS}/MIN
MDC_IDC_SET_BRADY_MAX_SENSOR_RATE: 130 {BEATS}/MIN
MDC_IDC_SET_BRADY_MAX_SENSOR_RATE: 130 {BEATS}/MIN
MDC_IDC_SET_BRADY_MODE: NORMAL
MDC_IDC_SET_BRADY_MODE: NORMAL
MDC_IDC_SET_BRADY_NIGHT_RATE: NORMAL
MDC_IDC_SET_BRADY_NIGHT_RATE: NORMAL
MDC_IDC_SET_LEADCHNL_RV_PACING_AMPLITUDE: 1.12
MDC_IDC_SET_LEADCHNL_RV_PACING_AMPLITUDE: 1.12
MDC_IDC_SET_LEADCHNL_RV_PACING_CAPTURE_MODE: NORMAL
MDC_IDC_SET_LEADCHNL_RV_PACING_CAPTURE_MODE: NORMAL
MDC_IDC_SET_LEADCHNL_RV_PACING_POLARITY: NORMAL
MDC_IDC_SET_LEADCHNL_RV_PACING_POLARITY: NORMAL
MDC_IDC_SET_LEADCHNL_RV_PACING_PULSEWIDTH: 0.6 MS
MDC_IDC_SET_LEADCHNL_RV_PACING_PULSEWIDTH: 0.6 MS
MDC_IDC_SET_LEADCHNL_RV_SENSING_POLARITY: NORMAL
MDC_IDC_SET_LEADCHNL_RV_SENSING_POLARITY: NORMAL
MDC_IDC_SET_LEADCHNL_RV_SENSING_SENSITIVITY: 2 MV
MDC_IDC_SET_LEADCHNL_RV_SENSING_SENSITIVITY: 2 MV
MDC_IDC_STAT_BRADY_RV_PERCENT_PACED: 93 %
MDC_IDC_STAT_BRADY_RV_PERCENT_PACED: 93 %

## 2021-10-18 ENCOUNTER — OFFICE VISIT (OUTPATIENT)
Dept: CARDIOLOGY | Facility: CLINIC | Age: 76
End: 2021-10-18
Payer: MEDICARE

## 2021-10-18 VITALS
SYSTOLIC BLOOD PRESSURE: 124 MMHG | DIASTOLIC BLOOD PRESSURE: 46 MMHG | HEART RATE: 59 BPM | BODY MASS INDEX: 32.32 KG/M2 | WEIGHT: 194 LBS | RESPIRATION RATE: 16 BRPM | HEIGHT: 65 IN

## 2021-10-18 DIAGNOSIS — Z98.890 S/P MITRAL VALVE CLIP IMPLANTATION: ICD-10-CM

## 2021-10-18 DIAGNOSIS — I48.21 PERMANENT ATRIAL FIBRILLATION (H): Primary | ICD-10-CM

## 2021-10-18 DIAGNOSIS — Z95.2 S/P AVR (AORTIC VALVE REPLACEMENT): ICD-10-CM

## 2021-10-18 DIAGNOSIS — I10 ESSENTIAL HYPERTENSION: ICD-10-CM

## 2021-10-18 DIAGNOSIS — I34.0 NONRHEUMATIC MITRAL VALVE REGURGITATION: ICD-10-CM

## 2021-10-18 DIAGNOSIS — I34.0 NONRHEUMATIC MITRAL VALVE REGURGITATION: Primary | ICD-10-CM

## 2021-10-18 DIAGNOSIS — I48.21 PERMANENT ATRIAL FIBRILLATION (H): ICD-10-CM

## 2021-10-18 DIAGNOSIS — Z95.818 S/P MITRAL VALVE CLIP IMPLANTATION: ICD-10-CM

## 2021-10-18 PROCEDURE — 99214 OFFICE O/P EST MOD 30 MIN: CPT | Performed by: INTERNAL MEDICINE

## 2021-10-18 ASSESSMENT — MIFFLIN-ST. JEOR: SCORE: 1541.86

## 2021-10-18 NOTE — PATIENT INSTRUCTIONS
Sorin Weeks,    It was a pleasure to see you today in the clinic regarding your recent MitraClip.     My recommendations after this visit include:     - no changes in medications at this time    You should followup with Dr. Romero on November 19      If you have questions or concerns, please call using the numbers below:    Valve Clinic Pool  [phone]    After Hours/Scheduling  [phone]    Otherwise you can dial the nurse directly at:    Lenin Aguilera RN  Valve clinic coordinator  [phone]

## 2021-10-18 NOTE — PROGRESS NOTES
Patient should see Dr. Campos at 6 months and will see our team again in 1 year    Smita Matos PA-C, MPAS  Structural Heart Program  [facility]

## 2021-10-18 NOTE — PROGRESS NOTES
HEART CARE ENCOUNTER NOTE       [facility]  [phone]    Assessment/Recommendations   Diagnoses and all orders for this visit:    Severe Mitral Regurgitation -s/p MitraClip implantation with 2 clips.  Post hospital discharge echocardiogram (see results below) show residual moderate to severe MR.  Patient has had significant improvement in symptoms.  He is no longer short of breath.  He has less lower extremity edema.  Color is better and he reports mind to being clear.  He is walking 1 to 2 miles per day.  He is scheduled to start cardiac rehab on November 1, but does not know if he wants to move forward with that.  Repeat echocardiogram will be on November 12 and he will see Dr. Romero for his 1 month follow-up visit on November 19.    Normally we would continue aspirin 81 mg daily indefinitely, but instructions from discharge at the [facility] said to stop the aspirin once INR greater than 2 so he has stopped aspirin.  For now we will continue warfarin therapy given that he needs to be on this chronically for his mechanical aortic valve.  This should adequately cover the clip as endothelialization takes place.  INR range is 2.5-3.5.     Dental caries - treated with chlorhexidine 2 weeks prior to procedure and patient just finished up the bottle so used for 2 weeks post procedure.  Patient is planning on having teeth pulled in the future, but for now will continue using scope mouthwash at home.     Chronic combined systolic and diastolic heart failure, NYHA class III -significantly improved with decreased lower extremity edema and no shortness of breath currently.  Continue Lasix 20 mg twice daily and beta-blocker therapy    Type 2 diabetes mellitus -noninsulin dependent.  Patient is back on home Metformin and glipizide.  Last hemoglobin A1c was 6.2     Hypertension -blood pressure today is controlled.  Patient will continue taking furosemide 20 mg twice daily,  "lisinopril/hydrochlorothiazide 20/12.5 mg daily and metoprolol succinate 100 mg daily     Single-vessel coronary artery disease -asymptomatic.  Continue medical therapy.     History of severe aortic stenosis - s/p mechanical aortic valve replacement.  With mild PVL.     Thank you for the opportunity to participate in the care of Sorin Weeks. It's been a pleasure working with him.  Please do not hesitate to call with any questions or concerns.       History of Present Illness/Subjective    I had the opportunity to see Sorin Weeks at the [facility] for his 1 week follow-up visit after transcatheter mitral valve repair with MitraClip x2.  His wife accompanies him to the visit today.    Stephen has a history of valvular heart disease and underwent mechanical aortic valve replacement in 1996.  He has some mild PVL that has been watched over several years.  He also has mitral regurgitation, combined systolic and diastolic heart failure, diabetes mellitus, hypertension, osteoarthritis and obstructive sleep apnea.  He recently noticed increased lower extremity edema and decreased exercise capacity so repeat echocardiogram was ordered earlier this summer and showed progression of his mitral regurgitation.    Patient underwent transcatheter mitral valve repair using 2 MitraClip's on October 4.  He has done significantly well in the last 2 weeks.  He has no more shortness of breath and can now easily pull his compression stocks on.  His wife says his color is better.  Patient says his mind is clearer and that his gait is more stable.  He has been walking his dogs and says he is probably walking between 1 and 2 miles per day.  He feels \"110% better\"    Sorin Weeks denies exertional chest discomfort, palpitations, shortness of breath at rest, PND, orthopnea, lightheadedness, dizziness, pre-syncope or syncope.  Sorin Weeks also denies any recent weight loss, changes in appetite, nausea or " "vomiting.   ____________________________________________________________  Pre-discharge echo from October 5:  Interpretation Summary  Left ventricular function is decreased. The ejection fraction is 50-55%  (borderline).  Right ventricular function is low-normal.  Post mitral clip placement x 2. There is moderate to severe mitral  regurgitation. The mean gradient across the valve is 5 mmHg.  A mechanical aortic valve is present. There is mild to moderate prosthetic  valvular and paravalvular regurgitation. P1/2 t = 381 ms. The mean gradient is  8 mmHg.  Dilation of the inferior vena cava is present with abnormal respiratory  variation in diameter.  No pericardial effusion is present.     Physical Examination Review of Systems   Vitals: /46 (BP Location: Left arm, Patient Position: Sitting, Cuff Size: Adult Large)   Pulse 59   Resp 16   Ht 1.651 m (5' 5\")   Wt 88 kg (194 lb)   BMI 32.28 kg/m    BMI= Body mass index is 32.28 kg/m .  Wt Readings from Last 3 Encounters:   10/18/21 88 kg (194 lb)   10/05/21 89.5 kg (197 lb 4.8 oz)   09/23/21 87.5 kg (193 lb)       General Appearance:   Alert, cooperative and in no acute distress   ENT/Mouth: membranes moist, no oral lesions or bleeding gums.      EYES:  no scleral icterus, normal conjunctivae   Neck: Supple without lymphadenopathy.  Thyroid not visualized   Chest/Lungs:   lungs are clear to auscultation, no rales or wheezing   Cardiovascular:   Regular. Normal first and second heart sounds with no murmurs, rubs, or gallops; the carotid, radial and posterior tibial pulses are intact, minimal edema bilaterally    Abdomen:  Soft and nontender. Bowel sounds are present in all quadrants   Extremities: no cyanosis or clubbing.  Puncture sites are still significantly bruised, but all soft with no hematoma.  Closure device palpated in right groin.    Skin: no xanthelasma, warm.    Neurologic: normal gait, normal  bilateral, no tremors   Psychiatric: Normal mood " and affect       Please refer above for cardiac ROS details.      Medical History  Surgical History Family History Social History   Past Medical History:   Diagnosis Date     Diabetes mellitus (H)      Hypertension      Past Surgical History:   Procedure Laterality Date     APPENDECTOMY       C REPLACE AORT VALV,PROSTH VALV      Description: Aortic Valve Replacement;  Recorded: 06/05/2013;     CARDIAC CATHETERIZATION       CV CORONARY ANGIOGRAM N/A 7/30/2021    Procedure: Coronary Angiogram;  Surgeon: Leonel Romero MD;  Location: [facility] CATH LAB CV     CV LEFT HEART CATH N/A 7/30/2021    Procedure: Left Heart Cath;  Surgeon: Leonel Romero MD;  Location: [facility] CATH LAB CV     CV MITRACLIP N/A 10/4/2021    Procedure: CV MITRAL CLIP;  Surgeon: Jacqueline Chilel MD;  Location: [facility] CARDIAC CATH LAB     CV RIGHT HEART CATH MEASUREMENTS RECORDED N/A 7/30/2021    Procedure: Right Heart Cath;  Surgeon: Leonel Romero MD;  Location: [facility] CATH LAB CV     CV SUPRAVALVULAR AORTOGRAM N/A 7/30/2021    Procedure: Supravalvular Aortagram;  Surgeon: Leonel Romero MD;  Location: [facility] CATH LAB CV     INSERT / REPLACE / REMOVE PACEMAKER       TOTAL SHOULDER REPLACEMENT Left      No family history on file. Social History     Socioeconomic History     Marital status:      Spouse name: Not on file     Number of children: Not on file     Years of education: Not on file     Highest education level: Not on file   Occupational History     Not on file   Tobacco Use     Smoking status: Former Smoker     Types: Cigarettes     Smokeless tobacco: Never Used   Substance and Sexual Activity     Alcohol use: Never     Drug use: Never     Sexual activity: Not on file   Other Topics Concern     Not on file   Social History Narrative     Not on file     Social Determinants of Health     Financial Resource Strain:      Difficulty of Paying Living Expenses:    Food Insecurity:      Worried About Running Out of  Food in the Last Year:      Ran Out of Food in the Last Year:    Transportation Needs:      Lack of Transportation (Medical):      Lack of Transportation (Non-Medical):    Physical Activity:      Days of Exercise per Week:      Minutes of Exercise per Session:    Stress:      Feeling of Stress :    Social Connections:      Frequency of Communication with Friends and Family:      Frequency of Social Gatherings with Friends and Family:      Attends Yazidi Services:      Active Member of Clubs or Organizations:      Attends Club or Organization Meetings:      Marital Status:    Intimate Partner Violence:      Fear of Current or Ex-Partner:      Emotionally Abused:      Physically Abused:      Sexually Abused:           Medications  Allergies   Current Outpatient Medications   Medication Sig Dispense Refill     finasteride (PROSCAR) 5 mg tablet Take 5 mg by mouth daily   5     furosemide (LASIX) 20 MG tablet [FUROSEMIDE (LASIX) 20 MG TABLET] Take 20 mg by mouth 2 (two) times a day. 40 mg qam and 20 mg in the evening per pt       glipiZIDE (GLUCOTROL) 10 MG 24 hr tablet [GLIPIZIDE (GLUCOTROL) 10 MG 24 HR TABLET] Take 10 mg by mouth daily.       lisinopril-hydrochlorothiazide (PRINZIDE,ZESTORETIC) 20-12.5 mg per tablet [LISINOPRIL-HYDROCHLOROTHIAZIDE (PRINZIDE,ZESTORETIC) 20-12.5 MG PER TABLET] Take 1 tablet by mouth daily.  0     metFORMIN (GLUCOPHAGE) 500 MG tablet Take 500 mg by mouth 2 times daily (with meals)        metoprolol succinate (TOPROL-XL) 100 MG 24 hr tablet [METOPROLOL SUCCINATE (TOPROL-XL) 100 MG 24 HR TABLET] Take 100 mg by mouth daily.       multivit,calc,mins/folic acid (ONE-A-DAY PROACTIVE 65 PLUS ORAL) [MULTIVIT,CALC,MINS/FOLIC ACID (ONE-A-DAY PROACTIVE 65 PLUS ORAL)] Take 1 tablet by mouth daily.       tamsulosin (FLOMAX) 0.4 mg Cp24 Take 0.4 mg by mouth daily (with breakfast)   5     warfarin (COUMADIN) 5 MG tablet 5 MG daily except on Tuesday's patient takes 1 & 1/2 (7.5 MG).      Allergies  "  Allergen Reactions     Blood Transfusion Related (Informational Only) Other (See Comments)     Patient has a history of a clinically significant antibody against RBC antigens.  A delay in compatible RBCs may occur.      Atenolol Unknown     Blood-Group Specific Substance      Anti-Jka present. Please allow up to 3 HRS for blood for transfusion. Draw 2 EDTA for all Type and Screen orders.     Buprenorphine Hcl [Buprenorphine] Other (See Comments)     pt. felt \"loopy\"     Codeine Unknown     Penicillins Unknown     Sulfa (Sulfonamide Antibiotics) [Sulfa Drugs] Unknown     Vancomycin Other (See Comments)     \"shuts down kidneys. Destroying venin\" per wife.         Lab Results    Chemistry/lipid CBC Cardiac Enzymes/BNP/TSH/INR   Recent Labs   Lab Test 07/30/21  0852   CHOL 128   HDL 28*   LDL 74   TRIG 129     Recent Labs   Lab Test 07/30/21  0852   LDL 74     Recent Labs   Lab Test 10/05/21  0708 10/05/21  0551 10/05/21  0112   NA  --  140  --    POTASSIUM  --  3.9  --    CHLORIDE  --  107  --    CO2  --  25  --    * 149*   < >   BUN  --  46*  --    CR  --  1.26*  --    GFRESTIMATED  --  55*  --    MARK  --  9.2  --     < > = values in this interval not displayed.     Recent Labs   Lab Test 10/05/21  0551 10/04/21  1121 10/04/21  0748   CR 1.26* 1.19 1.09     Recent Labs   Lab Test 10/04/21  1121   A1C 6.3*    Recent Labs   Lab Test 10/05/21  0551   WBC 7.0   HGB 10.2*   HCT 31.5*   MCV 99   *     Recent Labs   Lab Test 10/05/21  0551 10/04/21  1121 10/04/21  0748   HGB 10.2* 11.0* 12.3*    No results for input(s): TROPONINI in the last 96847 hours.  Recent Labs   Lab Test 09/23/21 0928 07/14/21  1403 06/08/21  1546   BNP 1,129*  --  1,264*   NTBNP  --  7,991*  --      No results for input(s): TSH in the last 67306 hours.  Recent Labs   Lab Test 10/05/21  0551 10/04/21  0748 09/23/21 0928   INR 1.24* 1.13 2.43*        38 minutes spent on the date of encounter doing chart review, review of test results, " interpretation with above tests, patient visit, documentation and discussion with family.      This note has been dictated using voice recognition software. Any grammatical or context distortions are unintentional and inherent to the software.

## 2021-10-18 NOTE — LETTER
10/18/2021    [facility] [place] [place] [address] [place] [address]    RE: Sorin Weeks       Dear Colleague,    I had the pleasure of seeing Sorin Weeks in the [facility].    HEART CARE ENCOUNTER NOTE       [facility]  [phone]    Assessment/Recommendations   Diagnoses and all orders for this visit:    Severe Mitral Regurgitation -s/p MitraClip implantation with 2 clips.  Post hospital discharge echocardiogram (see results below) show residual moderate to severe MR.  Patient has had significant improvement in symptoms.  He is no longer short of breath.  He has less lower extremity edema.  Color is better and he reports mind to being clear.  He is walking 1 to 2 miles per day.  He is scheduled to start cardiac rehab on November 1, but does not know if he wants to move forward with that.  Repeat echocardiogram will be on November 12 and he will see Dr. Romero for his 1 month follow-up visit on November 19.    Normally we would continue aspirin 81 mg daily indefinitely, but instructions from discharge at the [facility] said to stop the aspirin once INR greater than 2 so he has stopped aspirin.  For now we will continue warfarin therapy given that he needs to be on this chronically for his mechanical aortic valve.  This should adequately cover the clip as endothelialization takes place.  INR range is 2.5-3.5.     Dental caries - treated with chlorhexidine 2 weeks prior to procedure and patient just finished up the bottle so used for 2 weeks post procedure.  Patient is planning on having teeth pulled in the future, but for now will continue using scope mouthwash at home.     Chronic combined systolic and diastolic heart failure, NYHA class III -significantly improved with decreased lower extremity edema and no shortness of breath currently.  Continue Lasix 20 mg twice daily and  beta-blocker therapy    Type 2 diabetes mellitus -noninsulin dependent.  Patient is back on home Metformin and glipizide.  Last hemoglobin A1c was 6.2     Hypertension -blood pressure today is controlled.  Patient will continue taking furosemide 20 mg twice daily, lisinopril/hydrochlorothiazide 20/12.5 mg daily and metoprolol succinate 100 mg daily     Single-vessel coronary artery disease -asymptomatic.  Continue medical therapy.     History of severe aortic stenosis - s/p mechanical aortic valve replacement.  With mild PVL.     Thank you for the opportunity to participate in the care of Sorin Weeks. It's been a pleasure working with him.  Please do not hesitate to call with any questions or concerns.       History of Present Illness/Subjective    I had the opportunity to see Sorin Weeks at the [facility] for his 1 week follow-up visit after transcatheter mitral valve repair with MitraClip x2.  His wife accompanies him to the visit today.    Stephen has a history of valvular heart disease and underwent mechanical aortic valve replacement in 1996.  He has some mild PVL that has been watched over several years.  He also has mitral regurgitation, combined systolic and diastolic heart failure, diabetes mellitus, hypertension, osteoarthritis and obstructive sleep apnea.  He recently noticed increased lower extremity edema and decreased exercise capacity so repeat echocardiogram was ordered earlier this summer and showed progression of his mitral regurgitation.    Patient underwent transcatheter mitral valve repair using 2 MitraClip's on October 4.  He has done significantly well in the last 2 weeks.  He has no more shortness of breath and can now easily pull his compression stocks on.  His wife says his color is better.  Patient says his mind is clearer and that his gait is more stable.  He has been walking his dogs and says he is probably walking between 1 and 2 miles per day.  He feels  "\"110% better\"    Sorin Weeks denies exertional chest discomfort, palpitations, shortness of breath at rest, PND, orthopnea, lightheadedness, dizziness, pre-syncope or syncope.  Sorin Weeks also denies any recent weight loss, changes in appetite, nausea or vomiting.   ____________________________________________________________  Pre-discharge echo from October 5:  Interpretation Summary  Left ventricular function is decreased. The ejection fraction is 50-55%  (borderline).  Right ventricular function is low-normal.  Post mitral clip placement x 2. There is moderate to severe mitral  regurgitation. The mean gradient across the valve is 5 mmHg.  A mechanical aortic valve is present. There is mild to moderate prosthetic  valvular and paravalvular regurgitation. P1/2 t = 381 ms. The mean gradient is  8 mmHg.  Dilation of the inferior vena cava is present with abnormal respiratory  variation in diameter.  No pericardial effusion is present.     Physical Examination Review of Systems   Vitals: /46 (BP Location: Left arm, Patient Position: Sitting, Cuff Size: Adult Large)   Pulse 59   Resp 16   Ht 1.651 m (5' 5\")   Wt 88 kg (194 lb)   BMI 32.28 kg/m    BMI= Body mass index is 32.28 kg/m .  Wt Readings from Last 3 Encounters:   10/18/21 88 kg (194 lb)   10/05/21 89.5 kg (197 lb 4.8 oz)   09/23/21 87.5 kg (193 lb)       General Appearance:   Alert, cooperative and in no acute distress   ENT/Mouth: membranes moist, no oral lesions or bleeding gums.      EYES:  no scleral icterus, normal conjunctivae   Neck: Supple without lymphadenopathy.  Thyroid not visualized   Chest/Lungs:   lungs are clear to auscultation, no rales or wheezing   Cardiovascular:   Regular. Normal first and second heart sounds with no murmurs, rubs, or gallops; the carotid, radial and posterior tibial pulses are intact, minimal edema bilaterally    Abdomen:  Soft and nontender. Bowel sounds are present in all quadrants "   Extremities: no cyanosis or clubbing.  Puncture sites are still significantly bruised, but all soft with no hematoma.  Closure device palpated in right groin.    Skin: no xanthelasma, warm.    Neurologic: normal gait, normal  bilateral, no tremors   Psychiatric: Normal mood and affect       Please refer above for cardiac ROS details.      Medical History  Surgical History Family History Social History   Past Medical History:   Diagnosis Date     Diabetes mellitus (H)      Hypertension      Past Surgical History:   Procedure Laterality Date     APPENDECTOMY       C REPLACE AORT VALV,PROSTH VALV      Description: Aortic Valve Replacement;  Recorded: 06/05/2013;     CARDIAC CATHETERIZATION       CV CORONARY ANGIOGRAM N/A 7/30/2021    Procedure: Coronary Angiogram;  Surgeon: Leonel Romero MD;  Location: [facility] CATH LAB CV     CV LEFT HEART CATH N/A 7/30/2021    Procedure: Left Heart Cath;  Surgeon: Leonel Romero MD;  Location: [facility] CATH LAB CV     CV MITRACLIP N/A 10/4/2021    Procedure: CV MITRAL CLIP;  Surgeon: Jacqueline Chilel MD;  Location:  HEART CARDIAC CATH LAB     CV RIGHT HEART CATH MEASUREMENTS RECORDED N/A 7/30/2021    Procedure: Right Heart Cath;  Surgeon: Leonel Romero MD;  Location: [facility] CATH LAB CV     CV SUPRAVALVULAR AORTOGRAM N/A 7/30/2021    Procedure: Supravalvular Aortagram;  Surgeon: Leonel Romero MD;  Location: [facility] CATH LAB CV     INSERT / REPLACE / REMOVE PACEMAKER       TOTAL SHOULDER REPLACEMENT Left      No family history on file. Social History     Socioeconomic History     Marital status:      Spouse name: Not on file     Number of children: Not on file     Years of education: Not on file     Highest education level: Not on file   Occupational History     Not on file   Tobacco Use     Smoking status: Former Smoker     Types: Cigarettes     Smokeless tobacco: Never Used   Substance and Sexual Activity     Alcohol use: Never     Drug use: Never      Sexual activity: Not on file   Other Topics Concern     Not on file   Social History Narrative     Not on file     Social Determinants of Health     Financial Resource Strain:      Difficulty of Paying Living Expenses:    Food Insecurity:      Worried About Running Out of Food in the Last Year:      Ran Out of Food in the Last Year:    Transportation Needs:      Lack of Transportation (Medical):      Lack of Transportation (Non-Medical):    Physical Activity:      Days of Exercise per Week:      Minutes of Exercise per Session:    Stress:      Feeling of Stress :    Social Connections:      Frequency of Communication with Friends and Family:      Frequency of Social Gatherings with Friends and Family:      Attends Jain Services:      Active Member of Clubs or Organizations:      Attends Club or Organization Meetings:      Marital Status:    Intimate Partner Violence:      Fear of Current or Ex-Partner:      Emotionally Abused:      Physically Abused:      Sexually Abused:           Medications  Allergies   Current Outpatient Medications   Medication Sig Dispense Refill     finasteride (PROSCAR) 5 mg tablet Take 5 mg by mouth daily   5     furosemide (LASIX) 20 MG tablet [FUROSEMIDE (LASIX) 20 MG TABLET] Take 20 mg by mouth 2 (two) times a day. 40 mg qam and 20 mg in the evening per pt       glipiZIDE (GLUCOTROL) 10 MG 24 hr tablet [GLIPIZIDE (GLUCOTROL) 10 MG 24 HR TABLET] Take 10 mg by mouth daily.       lisinopril-hydrochlorothiazide (PRINZIDE,ZESTORETIC) 20-12.5 mg per tablet [LISINOPRIL-HYDROCHLOROTHIAZIDE (PRINZIDE,ZESTORETIC) 20-12.5 MG PER TABLET] Take 1 tablet by mouth daily.  0     metFORMIN (GLUCOPHAGE) 500 MG tablet Take 500 mg by mouth 2 times daily (with meals)        metoprolol succinate (TOPROL-XL) 100 MG 24 hr tablet [METOPROLOL SUCCINATE (TOPROL-XL) 100 MG 24 HR TABLET] Take 100 mg by mouth daily.       multivit,calc,mins/folic acid (ONE-A-DAY PROACTIVE 65 PLUS ORAL)  "[MULTIVIT,CALC,MINS/FOLIC ACID (ONE-A-DAY PROACTIVE 65 PLUS ORAL)] Take 1 tablet by mouth daily.       tamsulosin (FLOMAX) 0.4 mg Cp24 Take 0.4 mg by mouth daily (with breakfast)   5     warfarin (COUMADIN) 5 MG tablet 5 MG daily except on Tuesday's patient takes 1 & 1/2 (7.5 MG).      Allergies   Allergen Reactions     Blood Transfusion Related (Informational Only) Other (See Comments)     Patient has a history of a clinically significant antibody against RBC antigens.  A delay in compatible RBCs may occur.      Atenolol Unknown     Blood-Group Specific Substance      Anti-Jka present. Please allow up to 3 HRS for blood for transfusion. Draw 2 EDTA for all Type and Screen orders.     Buprenorphine Hcl [Buprenorphine] Other (See Comments)     pt. felt \"loopy\"     Codeine Unknown     Penicillins Unknown     Sulfa (Sulfonamide Antibiotics) [Sulfa Drugs] Unknown     Vancomycin Other (See Comments)     \"shuts down kidneys. Destroying venin\" per wife.         Lab Results    Chemistry/lipid CBC Cardiac Enzymes/BNP/TSH/INR   Recent Labs   Lab Test 07/30/21  0852   CHOL 128   HDL 28*   LDL 74   TRIG 129     Recent Labs   Lab Test 07/30/21  0852   LDL 74     Recent Labs   Lab Test 10/05/21  0708 10/05/21  0551 10/05/21  0112   NA  --  140  --    POTASSIUM  --  3.9  --    CHLORIDE  --  107  --    CO2  --  25  --    * 149*   < >   BUN  --  46*  --    CR  --  1.26*  --    GFRESTIMATED  --  55*  --    MARK  --  9.2  --     < > = values in this interval not displayed.     Recent Labs   Lab Test 10/05/21  0551 10/04/21  1121 10/04/21  0748   CR 1.26* 1.19 1.09     Recent Labs   Lab Test 10/04/21  1121   A1C 6.3*    Recent Labs   Lab Test 10/05/21  0551   WBC 7.0   HGB 10.2*   HCT 31.5*   MCV 99   *     Recent Labs   Lab Test 10/05/21  0551 10/04/21  1121 10/04/21  0748   HGB 10.2* 11.0* 12.3*    No results for input(s): TROPONINI in the last 62632 hours.  Recent Labs   Lab Test 09/23/21  0928 07/14/21  1403 " 06/08/21  1546   BNP 1,129*  --  1,264*   NTBNP  --  7,991*  --      No results for input(s): TSH in the last 23562 hours.  Recent Labs   Lab Test 10/05/21  0551 10/04/21  0748 09/23/21  0928   INR 1.24* 1.13 2.43*        38 minutes spent on the date of encounter doing chart review, review of test results, interpretation with above tests, patient visit, documentation and discussion with family.      This note has been dictated using voice recognition software. Any grammatical or context distortions are unintentional and inherent to the software.                    Thank you for allowing me to participate in the care of your patient.      Sincerely,     Smita Matos PA-C     [facility]  cc:   No referring provider defined for this encounter.

## 2021-10-24 ENCOUNTER — HEALTH MAINTENANCE LETTER (OUTPATIENT)
Age: 76
End: 2021-10-24

## 2021-11-01 ENCOUNTER — HOSPITAL ENCOUNTER (OUTPATIENT)
Dept: CARDIAC REHAB | Facility: CLINIC | Age: 76
End: 2021-11-01
Attending: INTERNAL MEDICINE
Payer: MEDICARE

## 2021-11-01 DIAGNOSIS — Z98.890 S/P MITRAL VALVE CLIP IMPLANTATION: ICD-10-CM

## 2021-11-01 DIAGNOSIS — Z95.818 S/P MITRAL VALVE CLIP IMPLANTATION: ICD-10-CM

## 2021-11-01 LAB
GLUCOSE BLDC GLUCOMTR-MCNC: 121 MG/DL (ref 70–99)
GLUCOSE BLDC GLUCOMTR-MCNC: 159 MG/DL (ref 70–99)

## 2021-11-01 PROCEDURE — 93797 PHYS/QHP OP CAR RHAB WO ECG: CPT | Mod: 59

## 2021-11-01 PROCEDURE — 93798 PHYS/QHP OP CAR RHAB W/ECG: CPT

## 2021-11-12 ENCOUNTER — HOSPITAL ENCOUNTER (OUTPATIENT)
Dept: CARDIOLOGY | Facility: HOSPITAL | Age: 76
Discharge: HOME | End: 2021-11-12
Attending: INTERNAL MEDICINE | Admitting: INTERNAL MEDICINE
Payer: MEDICARE

## 2021-11-12 DIAGNOSIS — I34.0 SEVERE MITRAL REGURGITATION: ICD-10-CM

## 2021-11-12 LAB — LVEF ECHO: NORMAL

## 2021-11-12 PROCEDURE — 999N000208 ECHOCARDIOGRAM COMPLETE

## 2021-11-12 PROCEDURE — 93306 TTE W/DOPPLER COMPLETE: CPT | Mod: 26 | Performed by: INTERNAL MEDICINE

## 2021-11-12 PROCEDURE — 255N000002 HC RX 255 OP 636: Performed by: INTERNAL MEDICINE

## 2021-11-16 DIAGNOSIS — Z98.890 S/P MITRAL VALVE CLIP IMPLANTATION: Primary | ICD-10-CM

## 2021-11-16 DIAGNOSIS — I50.20 HEART FAILURE WITH REDUCED EJECTION FRACTION, NYHA CLASS III (H): ICD-10-CM

## 2021-11-16 DIAGNOSIS — Z95.818 S/P MITRAL VALVE CLIP IMPLANTATION: Primary | ICD-10-CM

## 2021-11-19 ENCOUNTER — OFFICE VISIT (OUTPATIENT)
Dept: CARDIOLOGY | Facility: CLINIC | Age: 76
End: 2021-11-19

## 2021-11-19 ENCOUNTER — LAB (OUTPATIENT)
Dept: CARDIOLOGY | Facility: CLINIC | Age: 76
End: 2021-11-19
Payer: MEDICARE

## 2021-11-19 VITALS
BODY MASS INDEX: 32.78 KG/M2 | HEART RATE: 64 BPM | WEIGHT: 197 LBS | DIASTOLIC BLOOD PRESSURE: 48 MMHG | RESPIRATION RATE: 16 BRPM | SYSTOLIC BLOOD PRESSURE: 128 MMHG

## 2021-11-19 DIAGNOSIS — Z95.818 S/P MITRAL VALVE CLIP IMPLANTATION: Primary | ICD-10-CM

## 2021-11-19 DIAGNOSIS — Z95.818 S/P MITRAL VALVE CLIP IMPLANTATION: ICD-10-CM

## 2021-11-19 DIAGNOSIS — Z98.890 S/P MITRAL VALVE CLIP IMPLANTATION: ICD-10-CM

## 2021-11-19 DIAGNOSIS — I50.20 HEART FAILURE WITH REDUCED EJECTION FRACTION, NYHA CLASS III (H): ICD-10-CM

## 2021-11-19 DIAGNOSIS — Z98.890 S/P MITRAL VALVE CLIP IMPLANTATION: Primary | ICD-10-CM

## 2021-11-19 DIAGNOSIS — I35.1 AORTIC INSUFFICIENCY: ICD-10-CM

## 2021-11-19 LAB
ANION GAP SERPL CALCULATED.3IONS-SCNC: 9 MMOL/L (ref 5–18)
BUN SERPL-MCNC: 36 MG/DL (ref 8–28)
CALCIUM SERPL-MCNC: 10 MG/DL (ref 8.5–10.5)
CHLORIDE BLD-SCNC: 106 MMOL/L (ref 98–107)
CO2 SERPL-SCNC: 26 MMOL/L (ref 22–31)
CREAT SERPL-MCNC: 1.41 MG/DL (ref 0.7–1.3)
ERYTHROCYTE [DISTWIDTH] IN BLOOD BY AUTOMATED COUNT: 15.2 % (ref 10–15)
GFR SERPL CREATININE-BSD FRML MDRD: 48 ML/MIN/1.73M2
GLUCOSE BLD-MCNC: 198 MG/DL (ref 70–125)
HCT VFR BLD AUTO: 37.6 % (ref 40–53)
HGB BLD-MCNC: 12.2 G/DL (ref 13.3–17.7)
MCH RBC QN AUTO: 31.9 PG (ref 26.5–33)
MCHC RBC AUTO-ENTMCNC: 32.4 G/DL (ref 31.5–36.5)
MCV RBC AUTO: 98 FL (ref 78–100)
PLATELET # BLD AUTO: 150 10E3/UL (ref 150–450)
POTASSIUM BLD-SCNC: 4.6 MMOL/L (ref 3.5–5)
RBC # BLD AUTO: 3.83 10E6/UL (ref 4.4–5.9)
SODIUM SERPL-SCNC: 141 MMOL/L (ref 136–145)
WBC # BLD AUTO: 6.3 10E3/UL (ref 4–11)

## 2021-11-19 PROCEDURE — 80048 BASIC METABOLIC PNL TOTAL CA: CPT

## 2021-11-19 PROCEDURE — 36415 COLL VENOUS BLD VENIPUNCTURE: CPT

## 2021-11-19 PROCEDURE — 99214 OFFICE O/P EST MOD 30 MIN: CPT | Performed by: INTERNAL MEDICINE

## 2021-11-19 PROCEDURE — 85027 COMPLETE CBC AUTOMATED: CPT

## 2021-11-19 NOTE — PROGRESS NOTES
Leonel Romero MD   11/19/2021 10:58 AM CST Back to Top        Elisaebth Winslow,     His cre is up just a bit. I would recommend just going to 20mg of furosemide in am, forego the pm dose. Re-check BMP next week and ket us know if swelling gets worse on the lower dose furosemide.     Thx!     Leonel           BMP ordered. Med list updated. Called Stephen and updated on recommendations. M[place] to schedulers to arrange [place] [place]

## 2021-11-19 NOTE — PROGRESS NOTES
HEART CARE ENCOUNTER CONSULTATON NOTE      [facility]  [phone]      Assessment/Recommendations   Assessment/Plan: Sorin Weeks is a 75 year old male with mechanical AVR and PVL's, heart failure w/ preserved ejection fraction, severe MR, atrial fibrillation, enlargement of the ascending aorta, permanent pacemaker who is here for f/u post-MitraClip.    # Mitral regurgitation - s/p MitraClipX2 (3rd considered but not placed due to concern about gradient), effectively closing main commissural jets w/ ~ moderate (2+) residual central jet and a very impressive clinical improvement  - continue metop, lisinopril, furosemide    #CAD- single-vessel obstructive CAD in OM2 that appears to be  w/ collaterals vs. Ca2+ subtotal occlusion  - would hold off on intervention in the setting of no angina  - continue warfarin/metop    # Mechanical AVR - w/ mild-mod PVL  - continue warfarin for goal INR 2-3    F/u w/  in 6 mos w/ us in 1 year     History of Present Illness/Subjective    HPI: Sorin Weeks is a 75 year old male with mechanical AVR and PVL's, heart failure w/ preserved ejection fraction, severe MR, atrial fibrillation, enlargement of the ascending aorta, permanent pacemaker who is here for f/u post-MitraClip.    He states that he is feeling 'wonderful' post-procedure, and is surprised at how poorly he must have felt prior to the intervention. He is able to walk the dogs, and is only limited by knees and hips at this point, when the weather changes.  He denies CP/SOB/JOEL/orthopnea/PND/syncope/edema/N/V/D/F/C.       Recent Echocardiogram Results:  The left ventricle is moderately dilated.  Proximal septal thickening is noted.  The visual ejection fraction is 50-55%.  Septal wall motion abnormality may reflect pacemaker activation.  Mildly decreased right ventricular systolic function  TAPSE is abnormal, which is consistent with abnormal right ventricular  systolic  function.  MitraClip is present with stable bileaflet insertion.  Mean gradient across MV is 5 mmHg  Difficult to assess MR secondary to shadowing from the clip.  There is moderate (2+) mitral regurgitation.  There is moderate (2+) tricuspid regurgitation.  The right ventricular systolic pressure is approximated at 47mmHg plus the  right atrial pressure.  There is a mechanical aortic valve.  There is moderate paravalvular regurgitation present.  Mean gradient across prosthesis is 10 mmHg  Right ventricular diastolic pressure is approximated at 5mmHg plus the right  atrial pressure.  The ascending aorta is Moderately dilated.  IVC diameter >2.1 cm collapsing <50% with sniff suggests a high RA pressure  estimated at 15 mmHg or greater.  Compared to echo from 10/5/21 the MR is not as prominent,    Recent Coronary Angiogram Results:  Sorin Weeks is a 75 year old male Sorin Weeks is a 75 year old old male with mechanical AVR and PVL's, heart failure w/ preserved ejection fraction, severe MR, atrial fibrillation, enlargement of the ascending aorta, permanent pacemaker who is here for evaluation of causes of his recent progressive volume overload/heart failure.     - single-vessel obstructive CAD in OM2 that appears to be  w/ collaterals vs. 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 However, MR may be the primary issue, and I think may be worth addressing first; If HF and pHTN persist after MV repair (likely w/ MitraClip, given his  co-modbidities and re-do OHS) one or more of the 3 PVL jets could be closed percutaneously, albeit w/ some risk of impairing mechanical valve function  - would favor medical management for OM disease  - re-start  Warfarin tonight, lovenox bridge tomorrow   - continue aggressive risk factor modification          Physical Examination  Review of Systems   Vitals: There were no vitals taken for this visit.  BMI= There is no height or weight on file to calculate BMI.  Wt Readings from Last 3 Encounters:   10/18/21 88 kg (194 lb)   10/05/21 89.5 kg (197 lb 4.8 oz)   09/23/21 87.5 kg (193 lb)       General Appearance:   no distress, normal body habitus   ENT/Mouth: membranes moist, no oral lesions or bleeding gums.      EYES:  no scleral icterus, normal conjunctivae   Neck: no carotid bruits or thyromegaly   Chest/Lungs:   lungs are clear to auscultation, no rales or wheezing, + sternal scar, equal chest wall expansion    Cardiovascular:   Regular. Mechanical first and second heart sounds, no rubs, or gallops; the carotid, radial and posterior tibial pulses are intact, Jugular venous pressure 7, no edema bilaterally    Abdomen:  no organomegaly, masses, bruits, or tenderness; bowel sounds are present   Extremities: no cyanosis or clubbing   Skin: no xanthelasma, warm.    Neurologic: normal  bilateral, no tremors     Psychiatric: alert and oriented x3, calm        Please refer above for cardiac ROS details.        Medical History  Surgical History Family History Social History   Past Medical History:   Diagnosis Date     Diabetes mellitus (H)      Hypertension      Past Surgical History:   Procedure Laterality Date     APPENDECTOMY       C REPLACE AORT VALV,PROSTH VALV      Description: Aortic Valve Replacement;  Recorded: 06/05/2013;     CARDIAC CATHETERIZATION       CV CORONARY ANGIOGRAM N/A 7/30/2021    Procedure: Coronary Angiogram;  Surgeon: Leonel Romero MD;  Location: [facility] CATH LAB CV     CV LEFT HEART  CATH N/A 7/30/2021    Procedure: Left Heart Cath;  Surgeon: Leonel Romero MD;  Location: [facility] CATH LAB CV     CV MITRACLIP N/A 10/4/2021    Procedure: CV MITRAL CLIP;  Surgeon: Jacqueline Chilel MD;  Location:  HEART CARDIAC CATH LAB     CV RIGHT HEART CATH MEASUREMENTS RECORDED N/A 7/30/2021    Procedure: Right Heart Cath;  Surgeon: Leonel Romero MD;  Location: [facility] CATH LAB CV     CV SUPRAVALVULAR AORTOGRAM N/A 7/30/2021    Procedure: Supravalvular Aortagram;  Surgeon: Leonel Romero MD;  Location: [facility] CATH LAB CV     INSERT / REPLACE / REMOVE PACEMAKER       TOTAL SHOULDER REPLACEMENT Left      No family history on file.     Social History     Socioeconomic History     Marital status:      Spouse name: Not on file     Number of children: Not on file     Years of education: Not on file     Highest education level: Not on file   Occupational History     Not on file   Tobacco Use     Smoking status: Former Smoker     Types: Cigarettes     Smokeless tobacco: Never Used   Substance and Sexual Activity     Alcohol use: Never     Drug use: Never     Sexual activity: Not on file   Other Topics Concern     Not on file   Social History Narrative     Not on file     Social Determinants of Health     Financial Resource Strain: Not on file   Food Insecurity: Not on file   Transportation Needs: Not on file   Physical Activity: Not on file   Stress: Not on file   Social Connections: Not on file   Intimate Partner Violence: Not on file   Housing Stability: Not on file           Medications  Allergies   Current Outpatient Medications   Medication Sig Dispense Refill     finasteride (PROSCAR) 5 mg tablet Take 5 mg by mouth daily   5     furosemide (LASIX) 20 MG tablet [FUROSEMIDE (LASIX) 20 MG TABLET] Take 20 mg by mouth 2 (two) times a day. 40 mg qam and 20 mg in the evening per pt       glipiZIDE (GLUCOTROL) 10 MG 24 hr tablet [GLIPIZIDE (GLUCOTROL) 10 MG 24 HR TABLET] Take 10 mg by mouth  "daily.       lisinopril-hydrochlorothiazide (PRINZIDE,ZESTORETIC) 20-12.5 mg per tablet [LISINOPRIL-HYDROCHLOROTHIAZIDE (PRINZIDE,ZESTORETIC) 20-12.5 MG PER TABLET] Take 1 tablet by mouth daily.  0     metFORMIN (GLUCOPHAGE) 500 MG tablet Take 500 mg by mouth 2 times daily (with meals)        metoprolol succinate (TOPROL-XL) 100 MG 24 hr tablet [METOPROLOL SUCCINATE (TOPROL-XL) 100 MG 24 HR TABLET] Take 100 mg by mouth daily.       multivit,calc,mins/folic acid (ONE-A-DAY PROACTIVE 65 PLUS ORAL) [MULTIVIT,CALC,MINS/FOLIC ACID (ONE-A-DAY PROACTIVE 65 PLUS ORAL)] Take 1 tablet by mouth daily.       tamsulosin (FLOMAX) 0.4 mg Cp24 Take 0.4 mg by mouth daily (with breakfast)   5     warfarin (COUMADIN) 5 MG tablet 5 MG daily except on Tuesday's patient takes 1 & 1/2 (7.5 MG).         Allergies   Allergen Reactions     Blood Transfusion Related (Informational Only) Other (See Comments)     Patient has a history of a clinically significant antibody against RBC antigens.  A delay in compatible RBCs may occur.      Atenolol Unknown     Blood-Group Specific Substance      Anti-Jka present. Please allow up to 3 HRS for blood for transfusion. Draw 2 EDTA for all Type and Screen orders.     Buprenorphine Hcl [Buprenorphine] Other (See Comments)     pt. felt \"loopy\"     Codeine Unknown     Penicillins Unknown     Sulfa (Sulfonamide Antibiotics) [Sulfa Drugs] Unknown     Vancomycin Other (See Comments)     \"shuts down kidneys. Destroying venin\" per wife.          Lab Results    Chemistry/lipid CBC Cardiac Enzymes/BNP/TSH/INR   Recent Labs   Lab Test 07/30/21  0852   CHOL 128   HDL 28*   LDL 74   TRIG 129     Recent Labs   Lab Test 07/30/21  0852   LDL 74     Recent Labs   Lab Test 11/01/21  1505 10/05/21  0708 10/05/21  0551   NA  --   --  140   POTASSIUM  --   --  3.9   CHLORIDE  --   --  107   CO2  --   --  25   *   < > 149*   BUN  --   --  46*   CR  --   --  1.26*   GFRESTIMATED  --   --  55*   MARK  --   --  9.2    < > " = values in this interval not displayed.     Recent Labs   Lab Test 10/05/21  0551 10/04/21  1121 10/04/21  0748   CR 1.26* 1.19 1.09     Recent Labs   Lab Test 10/04/21  1121   A1C 6.3*          Recent Labs   Lab Test 10/05/21  0551   WBC 7.0   HGB 10.2*   HCT 31.5*   MCV 99   *     Recent Labs   Lab Test 10/05/21  0551 10/04/21  1121 10/04/21  0748   HGB 10.2* 11.0* 12.3*    No results for input(s): TROPONINI in the last 86153 hours.  Recent Labs   Lab Test 09/23/21  0928 07/14/21  1403 06/08/21  1546   BNP 1,129*  --  1,264*   NTBNP  --  7,991*  --      No results for input(s): TSH in the last 16518 hours.  Recent Labs   Lab Test 10/05/21  0551 10/04/21  0748 09/23/21  0928   INR 1.24* 1.13 2.43*        Leonel Romero MD

## 2021-11-19 NOTE — LETTER
11/19/2021    Joseph COSTELLO [facility] [address] [place] [address]    RE: Sorin Loredouelson       Dear Colleague,    I had the pleasure of seeing Sorin Weeks in the [facility].      HEART CARE ENCOUNTER CONSULTATON NOTE      [facility]  [phone]      Assessment/Recommendations   Assessment/Plan: Sorin Weeks is a 75 year old male with mechanical AVR and PVL's, heart failure w/ preserved ejection fraction, severe MR, atrial fibrillation, enlargement of the ascending aorta, permanent pacemaker who is here for f/u post-MitraClip.    # Mitral regurgitation - s/p MitraClipX2 (3rd considered but not placed due to concern about gradient), effectively closing main commissural jets w/ ~ moderate (2+) residual central jet and a very impressive clinical improvement  - continue metop, lisinopril, furosemide    #CAD- single-vessel obstructive CAD in OM2 that appears to be  w/ collaterals vs. Ca2+ subtotal occlusion  - would hold off on intervention in the setting of no angina  - continue warfarin/metop    # Mechanical AVR - w/ mild-mod PVL  - continue warfarin for goal INR 2-3    F/u w/  in 6 mos w/ us in 1 year     History of Present Illness/Subjective    HPI: Sorin Weeks is a 75 year old male with mechanical AVR and PVL's, heart failure w/ preserved ejection fraction, severe MR, atrial fibrillation, enlargement of the ascending aorta, permanent pacemaker who is here for f/u post-MitraClip.    He states that he is feeling 'wonderful' post-procedure, and is surprised at how poorly he must have felt prior to the intervention. He is able to walk the dogs, and is only limited by knees and hips at this point, when the weather changes.  He denies CP/SOB/JOEL/orthopnea/PND/syncope/edema/N/V/D/F/C.       Recent Echocardiogram Results:  The left ventricle is moderately dilated.  Proximal  septal thickening is noted.  The visual ejection fraction is 50-55%.  Septal wall motion abnormality may reflect pacemaker activation.  Mildly decreased right ventricular systolic function  TAPSE is abnormal, which is consistent with abnormal right ventricular  systolic function.  MitraClip is present with stable bileaflet insertion.  Mean gradient across MV is 5 mmHg  Difficult to assess MR secondary to shadowing from the clip.  There is moderate (2+) mitral regurgitation.  There is moderate (2+) tricuspid regurgitation.  The right ventricular systolic pressure is approximated at 47mmHg plus the  right atrial pressure.  There is a mechanical aortic valve.  There is moderate paravalvular regurgitation present.  Mean gradient across prosthesis is 10 mmHg  Right ventricular diastolic pressure is approximated at 5mmHg plus the right  atrial pressure.  The ascending aorta is Moderately dilated.  IVC diameter >2.1 cm collapsing <50% with sniff suggests a high RA pressure  estimated at 15 mmHg or greater.  Compared to echo from 10/5/21 the MR is not as prominent,    Recent Coronary Angiogram Results:  Sorin Weeks is a 75 year old male Soirn Weeks is a 75 year old old male with mechanical AVR and PVL's, heart failure w/ preserved ejection fraction, severe MR, atrial fibrillation, enlargement of the ascending aorta, permanent pacemaker who is here for evaluation of causes of his recent progressive volume overload/heart failure.     - single-vessel obstructive CAD in OM2 that appears to be  w/ collaterals vs. Ca2+ subtotal occlusion  - aortic root enlargement  - mild-mod AI (based on root injection as well as LV waveform without rapid pressure equalization, speaking against severe AI)  - normal R-sided filling pressures, mildly elevated L-sided filling pressures, severe pHTN PASP 60's consistent w/ pulmonary venous HTN (due to L-sided HF), w/ prominent V-waves on PCWP tracing, suggestive of severe MR  - his  recent worsening preserved LVEF HF and pHTN are likely multi-factorial w/ both the PVL's and MR contributing. However, MR may be the primary issue, and I think may be worth addressing first; If HF and pHTN persist after MV repair (likely w/ MitraClip, given his co-modbidities and re-do OHS) one or more of the 3 PVL jets could be closed percutaneously, albeit w/ some risk of impairing mechanical valve function  - would favor medical management for OM disease  - re-start  Warfarin tonight, lovenox bridge tomorrow   - continue aggressive risk factor modification          Physical Examination  Review of Systems   Vitals: There were no vitals taken for this visit.  BMI= There is no height or weight on file to calculate BMI.  Wt Readings from Last 3 Encounters:   10/18/21 88 kg (194 lb)   10/05/21 89.5 kg (197 lb 4.8 oz)   09/23/21 87.5 kg (193 lb)       General Appearance:   no distress, normal body habitus   ENT/Mouth: membranes moist, no oral lesions or bleeding gums.      EYES:  no scleral icterus, normal conjunctivae   Neck: no carotid bruits or thyromegaly   Chest/Lungs:   lungs are clear to auscultation, no rales or wheezing, + sternal scar, equal chest wall expansion    Cardiovascular:   Regular. Mechanical first and second heart sounds, no rubs, or gallops; the carotid, radial and posterior tibial pulses are intact, Jugular venous pressure 7, no edema bilaterally    Abdomen:  no organomegaly, masses, bruits, or tenderness; bowel sounds are present   Extremities: no cyanosis or clubbing   Skin: no xanthelasma, warm.    Neurologic: normal  bilateral, no tremors     Psychiatric: alert and oriented x3, calm        Please refer above for cardiac ROS details.        Medical History  Surgical History Family History Social History   Past Medical History:   Diagnosis Date     Diabetes mellitus (H)      Hypertension      Past Surgical History:   Procedure Laterality Date     APPENDECTOMY       C REPLACE AORT  VALV,PROSTH VALV      Description: Aortic Valve Replacement;  Recorded: 06/05/2013;     CARDIAC CATHETERIZATION       CV CORONARY ANGIOGRAM N/A 7/30/2021    Procedure: Coronary Angiogram;  Surgeon: Leonel Romero MD;  Location: [facility] CATH LAB CV     CV LEFT HEART CATH N/A 7/30/2021    Procedure: Left Heart Cath;  Surgeon: Leonel Romero MD;  Location: [facility] CATH LAB CV     CV MITRACLIP N/A 10/4/2021    Procedure: CV MITRAL CLIP;  Surgeon: Jacqueline Chilel MD;  Location: [facility] CARDIAC CATH LAB     CV RIGHT HEART CATH MEASUREMENTS RECORDED N/A 7/30/2021    Procedure: Right Heart Cath;  Surgeon: Leonel Romero MD;  Location: [facility] CATH LAB CV     CV SUPRAVALVULAR AORTOGRAM N/A 7/30/2021    Procedure: Supravalvular Aortagram;  Surgeon: Leonel Romero MD;  Location: [facility] CATH LAB CV     INSERT / REPLACE / REMOVE PACEMAKER       TOTAL SHOULDER REPLACEMENT Left      No family history on file.     Social History     Socioeconomic History     Marital status:      Spouse name: Not on file     Number of children: Not on file     Years of education: Not on file     Highest education level: Not on file   Occupational History     Not on file   Tobacco Use     Smoking status: Former Smoker     Types: Cigarettes     Smokeless tobacco: Never Used   Substance and Sexual Activity     Alcohol use: Never     Drug use: Never     Sexual activity: Not on file   Other Topics Concern     Not on file   Social History Narrative     Not on file     Social Determinants of Health     Financial Resource Strain: Not on file   Food Insecurity: Not on file   Transportation Needs: Not on file   Physical Activity: Not on file   Stress: Not on file   Social Connections: Not on file   Intimate Partner Violence: Not on file   Housing Stability: Not on file           Medications  Allergies   Current Outpatient Medications   Medication Sig Dispense Refill     finasteride (PROSCAR) 5 mg tablet Take 5 mg by mouth daily    "5     furosemide (LASIX) 20 MG tablet [FUROSEMIDE (LASIX) 20 MG TABLET] Take 20 mg by mouth 2 (two) times a day. 40 mg qam and 20 mg in the evening per pt       glipiZIDE (GLUCOTROL) 10 MG 24 hr tablet [GLIPIZIDE (GLUCOTROL) 10 MG 24 HR TABLET] Take 10 mg by mouth daily.       lisinopril-hydrochlorothiazide (PRINZIDE,ZESTORETIC) 20-12.5 mg per tablet [LISINOPRIL-HYDROCHLOROTHIAZIDE (PRINZIDE,ZESTORETIC) 20-12.5 MG PER TABLET] Take 1 tablet by mouth daily.  0     metFORMIN (GLUCOPHAGE) 500 MG tablet Take 500 mg by mouth 2 times daily (with meals)        metoprolol succinate (TOPROL-XL) 100 MG 24 hr tablet [METOPROLOL SUCCINATE (TOPROL-XL) 100 MG 24 HR TABLET] Take 100 mg by mouth daily.       multivit,calc,mins/folic acid (ONE-A-DAY PROACTIVE 65 PLUS ORAL) [MULTIVIT,CALC,MINS/FOLIC ACID (ONE-A-DAY PROACTIVE 65 PLUS ORAL)] Take 1 tablet by mouth daily.       tamsulosin (FLOMAX) 0.4 mg Cp24 Take 0.4 mg by mouth daily (with breakfast)   5     warfarin (COUMADIN) 5 MG tablet 5 MG daily except on Tuesday's patient takes 1 & 1/2 (7.5 MG).         Allergies   Allergen Reactions     Blood Transfusion Related (Informational Only) Other (See Comments)     Patient has a history of a clinically significant antibody against RBC antigens.  A delay in compatible RBCs may occur.      Atenolol Unknown     Blood-Group Specific Substance      Anti-Jka present. Please allow up to 3 HRS for blood for transfusion. Draw 2 EDTA for all Type and Screen orders.     Buprenorphine Hcl [Buprenorphine] Other (See Comments)     pt. felt \"loopy\"     Codeine Unknown     Penicillins Unknown     Sulfa (Sulfonamide Antibiotics) [Sulfa Drugs] Unknown     Vancomycin Other (See Comments)     \"shuts down kidneys. Destroying venin\" per wife.          Lab Results    Chemistry/lipid CBC Cardiac Enzymes/BNP/TSH/INR   Recent Labs   Lab Test 07/30/21  0852   CHOL 128   HDL 28*   LDL 74   TRIG 129     Recent Labs   Lab Test 07/30/21  0852   LDL 74     Recent Labs "   Lab Test 11/01/21  1505 10/05/21  0708 10/05/21  0551   NA  --   --  140   POTASSIUM  --   --  3.9   CHLORIDE  --   --  107   CO2  --   --  25   *   < > 149*   BUN  --   --  46*   CR  --   --  1.26*   GFRESTIMATED  --   --  55*   MARK  --   --  9.2    < > = values in this interval not displayed.     Recent Labs   Lab Test 10/05/21  0551 10/04/21  1121 10/04/21  0748   CR 1.26* 1.19 1.09     Recent Labs   Lab Test 10/04/21  1121   A1C 6.3*          Recent Labs   Lab Test 10/05/21  0551   WBC 7.0   HGB 10.2*   HCT 31.5*   MCV 99   *     Recent Labs   Lab Test 10/05/21  0551 10/04/21  1121 10/04/21  0748   HGB 10.2* 11.0* 12.3*    No results for input(s): TROPONINI in the last 98180 hours.  Recent Labs   Lab Test 09/23/21  0928 07/14/21  1403 06/08/21  1546   BNP 1,129*  --  1,264*   NTBNP  --  7,991*  --      No results for input(s): TSH in the last 63643 hours.  Recent Labs   Lab Test 10/05/21  0551 10/04/21  0748 09/23/21  0928   INR 1.24* 1.13 2.43*        Leonel Romero MD        Thank you for allowing me to participate in the care of your patient.      Sincerely,     Leonel Romero MD     [facility]  cc:   No referring provider defined for this encounter.

## 2021-11-19 NOTE — PATIENT INSTRUCTIONS
It is great to see you today!    I am very pleased with how well you have done    Let's hold the course - continue current meds    F/u w/  in 6 mos, w/ us in 1 year

## 2021-11-26 ENCOUNTER — LAB (OUTPATIENT)
Dept: CARDIOLOGY | Facility: CLINIC | Age: 76
End: 2021-11-26
Payer: MEDICARE

## 2021-11-26 DIAGNOSIS — Z98.890 S/P MITRAL VALVE CLIP IMPLANTATION: ICD-10-CM

## 2021-11-26 DIAGNOSIS — I35.1 AORTIC INSUFFICIENCY: ICD-10-CM

## 2021-11-26 DIAGNOSIS — Z95.818 S/P MITRAL VALVE CLIP IMPLANTATION: ICD-10-CM

## 2021-11-26 LAB
ANION GAP SERPL CALCULATED.3IONS-SCNC: 12 MMOL/L (ref 5–18)
BUN SERPL-MCNC: 35 MG/DL (ref 8–28)
CALCIUM SERPL-MCNC: 10.3 MG/DL (ref 8.5–10.5)
CHLORIDE BLD-SCNC: 104 MMOL/L (ref 98–107)
CO2 SERPL-SCNC: 24 MMOL/L (ref 22–31)
CREAT SERPL-MCNC: 1.22 MG/DL (ref 0.7–1.3)
GFR SERPL CREATININE-BSD FRML MDRD: 58 ML/MIN/1.73M2
GLUCOSE BLD-MCNC: 124 MG/DL (ref 70–125)
POTASSIUM BLD-SCNC: 4.4 MMOL/L (ref 3.5–5)
SODIUM SERPL-SCNC: 140 MMOL/L (ref 136–145)

## 2021-11-26 PROCEDURE — 80048 BASIC METABOLIC PNL TOTAL CA: CPT

## 2021-11-26 PROCEDURE — 36415 COLL VENOUS BLD VENIPUNCTURE: CPT

## 2021-12-03 ENCOUNTER — TELEPHONE (OUTPATIENT)
Dept: CARDIOLOGY | Facility: CLINIC | Age: 76
End: 2021-12-03
Payer: MEDICARE

## 2021-12-03 DIAGNOSIS — R60.0 LOCALIZED EDEMA: ICD-10-CM

## 2021-12-03 DIAGNOSIS — I50.20 HEART FAILURE WITH REDUCED EJECTION FRACTION, NYHA CLASS III (H): Primary | ICD-10-CM

## 2021-12-03 NOTE — TELEPHONE ENCOUNTER
----- Message from Kymberly Lopez sent at 12/3/2021  1:00 PM CST -----  Regarding: [facility] PT  General phone call:    Caller: Sorin   Primary cardiologist: [facility]  Detailed reason for call: Labs done 11/26 and he would like to know about results and if any changes in his meds needed   Best phone number: [phone]  Best time to contact: any  Ok to leave a detailedmessage? yes  Device? Pacer    Additional Info:

## 2021-12-03 NOTE — TELEPHONE ENCOUNTER
Informed pt 11/26/21 labs have not been reviewed, but look better and he should continue taking medication as he is.  When confirming med dosage - pt states he is taking 40 mg furosemide daily, not 20 as listed in chart.    Per previous result note:  Chasidy Peacock RN   11/19/2021  5:18 PM CST Back to Top        Called patient and updated on BMP results and recommendations from [facility]. Order placed for BMP and med list updated to reflect furosemide change to just 20 mg daily. He was instructed to call with any HF symptoms. Msg to schedulers to arrange [place] [facility] [place]    Leonel Romero MD   11/19/2021 10:58 AM CST         Elisabeth Winslow,     His cre is up just a bit. I would recommend just going to 20mg of furosemide in am, forego the pm dose. Re-check BMP next week and ket us know if swelling gets worse on the lower dose furosemide.     Thx!     Leonel     His am dose was two of the 20 mg tablets, and he did forego the pm dose.    Dr Romero - since kidney function improved on furosemide 40 mg daily, did you want pt to decrease to 20 mg daily, or can he continue on with 40 mg daily?  Pt denies worsening edema.  -sam

## 2021-12-06 RX ORDER — FUROSEMIDE 20 MG
40 TABLET ORAL DAILY
Qty: 180 TABLET | Refills: 1 | Status: SHIPPED | OUTPATIENT
Start: 2021-12-06

## 2021-12-06 NOTE — TELEPHONE ENCOUNTER
Recommendations discussed with pt.  Pt verbalized understanding and had no questions.  Furosemide dose updated.  -rah    ===View-only below this line===  ----- Message -----  From: Leonel Romero MD  Sent: 12/6/2021   1:43 PM CST  To: Elisabeth Mack RN    I agree 40mg once daily sounds like a good dose at the moment.    Thx!    Leonel

## 2021-12-21 ENCOUNTER — ANCILLARY PROCEDURE (OUTPATIENT)
Dept: CARDIOLOGY | Facility: CLINIC | Age: 76
End: 2021-12-21
Attending: INTERNAL MEDICINE
Payer: MEDICARE

## 2021-12-21 DIAGNOSIS — Z95.0 CARDIAC PACEMAKER IN SITU: ICD-10-CM

## 2021-12-21 DIAGNOSIS — I44.30 AV BLOCK: ICD-10-CM

## 2021-12-21 LAB
MDC_IDC_LEAD_IMPLANT_DT: NORMAL
MDC_IDC_LEAD_LOCATION: NORMAL
MDC_IDC_LEAD_LOCATION_DETAIL_1: NORMAL
MDC_IDC_LEAD_MFG: NORMAL
MDC_IDC_LEAD_MODEL: NORMAL
MDC_IDC_LEAD_POLARITY_TYPE: NORMAL
MDC_IDC_LEAD_SERIAL: NORMAL
MDC_IDC_MSMT_BATTERY_DTM: NORMAL
MDC_IDC_MSMT_BATTERY_REMAINING_LONGEVITY: 76 MO
MDC_IDC_MSMT_BATTERY_REMAINING_PERCENTAGE: 57 %
MDC_IDC_MSMT_BATTERY_RRT_TRIGGER: NORMAL
MDC_IDC_MSMT_BATTERY_STATUS: NORMAL
MDC_IDC_MSMT_BATTERY_VOLTAGE: 2.89 V
MDC_IDC_MSMT_LEADCHNL_RV_IMPEDANCE_VALUE: 440 OHM
MDC_IDC_MSMT_LEADCHNL_RV_LEAD_CHANNEL_STATUS: NORMAL
MDC_IDC_MSMT_LEADCHNL_RV_PACING_THRESHOLD_AMPLITUDE: 1 V
MDC_IDC_MSMT_LEADCHNL_RV_PACING_THRESHOLD_PULSEWIDTH: 0.6 MS
MDC_IDC_MSMT_LEADCHNL_RV_SENSING_INTR_AMPL: 9.7 MV
MDC_IDC_PG_IMPLANT_DTM: NORMAL
MDC_IDC_PG_MFG: NORMAL
MDC_IDC_PG_MODEL: NORMAL
MDC_IDC_PG_SERIAL: NORMAL
MDC_IDC_PG_TYPE: NORMAL
MDC_IDC_SESS_CLINIC_NAME: NORMAL
MDC_IDC_SESS_DTM: NORMAL
MDC_IDC_SESS_REPROGRAMMED: NO
MDC_IDC_SESS_TYPE: NORMAL
MDC_IDC_SET_BRADY_LOWRATE: 60 {BEATS}/MIN
MDC_IDC_SET_BRADY_MAX_SENSOR_RATE: 130 {BEATS}/MIN
MDC_IDC_SET_BRADY_MODE: NORMAL
MDC_IDC_SET_LEADCHNL_RV_PACING_AMPLITUDE: 1.25 V
MDC_IDC_SET_LEADCHNL_RV_PACING_ANODE_ELECTRODE_1: NORMAL
MDC_IDC_SET_LEADCHNL_RV_PACING_ANODE_LOCATION_1: NORMAL
MDC_IDC_SET_LEADCHNL_RV_PACING_CAPTURE_MODE: NORMAL
MDC_IDC_SET_LEADCHNL_RV_PACING_CATHODE_ELECTRODE_1: NORMAL
MDC_IDC_SET_LEADCHNL_RV_PACING_CATHODE_LOCATION_1: NORMAL
MDC_IDC_SET_LEADCHNL_RV_PACING_POLARITY: NORMAL
MDC_IDC_SET_LEADCHNL_RV_PACING_PULSEWIDTH: 0.6 MS
MDC_IDC_SET_LEADCHNL_RV_SENSING_ADAPTATION_MODE: NORMAL
MDC_IDC_SET_LEADCHNL_RV_SENSING_ANODE_ELECTRODE_1: NORMAL
MDC_IDC_SET_LEADCHNL_RV_SENSING_ANODE_LOCATION_1: NORMAL
MDC_IDC_SET_LEADCHNL_RV_SENSING_CATHODE_ELECTRODE_1: NORMAL
MDC_IDC_SET_LEADCHNL_RV_SENSING_CATHODE_LOCATION_1: NORMAL
MDC_IDC_SET_LEADCHNL_RV_SENSING_POLARITY: NORMAL
MDC_IDC_SET_LEADCHNL_RV_SENSING_SENSITIVITY: 2 MV
MDC_IDC_STAT_AT_DTM_END: NORMAL
MDC_IDC_STAT_AT_DTM_START: NORMAL
MDC_IDC_STAT_BRADY_DTM_END: NORMAL
MDC_IDC_STAT_BRADY_DTM_START: NORMAL
MDC_IDC_STAT_BRADY_RV_PERCENT_PACED: 93 %
MDC_IDC_STAT_CRT_DTM_END: NORMAL
MDC_IDC_STAT_CRT_DTM_START: NORMAL
MDC_IDC_STAT_HEART_RATE_DTM_END: NORMAL
MDC_IDC_STAT_HEART_RATE_DTM_START: NORMAL
MDC_IDC_STAT_HEART_RATE_VENTRICULAR_MAX: 230 {BEATS}/MIN
MDC_IDC_STAT_HEART_RATE_VENTRICULAR_MEAN: 71 {BEATS}/MIN
MDC_IDC_STAT_HEART_RATE_VENTRICULAR_MIN: 50 {BEATS}/MIN

## 2021-12-21 PROCEDURE — 93294 REM INTERROG EVL PM/LDLS PM: CPT | Performed by: INTERNAL MEDICINE

## 2021-12-21 PROCEDURE — 93296 REM INTERROG EVL PM/IDS: CPT | Performed by: INTERNAL MEDICINE

## 2022-01-01 NOTE — PRE-PROCEDURE
Pre-Procedure by Jhony Campos MD at 7/7/2021  1:56 PM     Author: Jhony Campos MD Service: Cardiology Author Type: Physician    Filed: 7/7/2021  1:56 PM Date of Service: 7/7/2021  1:56 PM Status: Signed    : Jhony Campos MD (Physician)           Verbal consent obtained?: Yes  Written consent obtained?: Yes  Risks and benefits: Risks, benefits and alternatives were discussed  Consent given by: patient  Expected level of sedation: moderate  ASA Class: Class 3- Severe systemic disease, definite functional limitations  Mallampati: Grade 1- soft palate, uvula, tonsillar pillars, and posterior pharyngeal wall visible  Patient states understanding of procedure being performed: Yes  Patient's understanding of procedure matches consent: Yes  Procedure consent matches procedure scheduled: Yes  Appropriately NPO: yes  Lungs: lungs clear with good breath sounds bilaterally  Heart: systolic murmur  History & Physical reviewed: History and physical reviewed and no updates needed  Statement of review: I have reviewed the lab findings, diagnostic data, medications, and the plan for sedation           
PMD

## 2022-02-13 ENCOUNTER — HEALTH MAINTENANCE LETTER (OUTPATIENT)
Age: 77
End: 2022-02-13

## 2022-03-23 ENCOUNTER — TELEPHONE (OUTPATIENT)
Dept: CARDIOLOGY | Facility: CLINIC | Age: 77
End: 2022-03-23
Payer: MEDICARE

## 2022-03-23 NOTE — TELEPHONE ENCOUNTER
Phone call to patient wife. She is requesting increase in patient's Lasix d/t LE swelling. Advised that it would be safest to have patient evaluated because he has not been seen by Dr. Campos in 6 months. Wife agreed, but ultimately decided to contact PCP as patient has been seen more recently. Encouraged Joe to call back if more questions or further assistance needed. Understanding and agreement verbalized. -marie [facility]    Phone Message    May a detailed message be left on voicemail: yes     Reason for Call: Other: Patients wife joe called and spoke with writer, she states she needs to speak with care team regarding patients water pill. Please call joe at [phone]     Action Taken: Message routed to:  [facility]     Travel Screening: Not Applicable

## 2022-04-05 ENCOUNTER — ANCILLARY PROCEDURE (OUTPATIENT)
Dept: CARDIOLOGY | Facility: CLINIC | Age: 77
End: 2022-04-05
Attending: INTERNAL MEDICINE
Payer: MEDICARE

## 2022-04-05 DIAGNOSIS — I44.30 AV BLOCK: ICD-10-CM

## 2022-04-05 DIAGNOSIS — Z95.0 CARDIAC PACEMAKER IN SITU: ICD-10-CM

## 2022-04-05 PROCEDURE — 93294 REM INTERROG EVL PM/LDLS PM: CPT | Performed by: INTERNAL MEDICINE

## 2022-04-05 PROCEDURE — 93296 REM INTERROG EVL PM/IDS: CPT | Performed by: INTERNAL MEDICINE

## 2022-04-06 LAB
MDC_IDC_LEAD_IMPLANT_DT: NORMAL
MDC_IDC_LEAD_LOCATION: NORMAL
MDC_IDC_LEAD_LOCATION_DETAIL_1: NORMAL
MDC_IDC_LEAD_MFG: NORMAL
MDC_IDC_LEAD_MODEL: NORMAL
MDC_IDC_LEAD_POLARITY_TYPE: NORMAL
MDC_IDC_LEAD_SERIAL: NORMAL
MDC_IDC_MSMT_BATTERY_DTM: NORMAL
MDC_IDC_MSMT_BATTERY_REMAINING_LONGEVITY: 77 MO
MDC_IDC_MSMT_BATTERY_REMAINING_PERCENTAGE: 57 %
MDC_IDC_MSMT_BATTERY_RRT_TRIGGER: NORMAL
MDC_IDC_MSMT_BATTERY_STATUS: NORMAL
MDC_IDC_MSMT_BATTERY_VOLTAGE: 2.89 V
MDC_IDC_MSMT_LEADCHNL_RV_IMPEDANCE_VALUE: 440 OHM
MDC_IDC_MSMT_LEADCHNL_RV_LEAD_CHANNEL_STATUS: NORMAL
MDC_IDC_MSMT_LEADCHNL_RV_PACING_THRESHOLD_AMPLITUDE: 0.88 V
MDC_IDC_MSMT_LEADCHNL_RV_PACING_THRESHOLD_PULSEWIDTH: 0.6 MS
MDC_IDC_MSMT_LEADCHNL_RV_SENSING_INTR_AMPL: 5.6 MV
MDC_IDC_PG_IMPLANT_DTM: NORMAL
MDC_IDC_PG_MFG: NORMAL
MDC_IDC_PG_MODEL: NORMAL
MDC_IDC_PG_SERIAL: NORMAL
MDC_IDC_PG_TYPE: NORMAL
MDC_IDC_SESS_CLINIC_NAME: NORMAL
MDC_IDC_SESS_DTM: NORMAL
MDC_IDC_SESS_REPROGRAMMED: NO
MDC_IDC_SESS_TYPE: NORMAL
MDC_IDC_SET_BRADY_LOWRATE: 60 {BEATS}/MIN
MDC_IDC_SET_BRADY_MAX_SENSOR_RATE: 130 {BEATS}/MIN
MDC_IDC_SET_BRADY_MODE: NORMAL
MDC_IDC_SET_LEADCHNL_RV_PACING_AMPLITUDE: 1.12
MDC_IDC_SET_LEADCHNL_RV_PACING_ANODE_ELECTRODE_1: NORMAL
MDC_IDC_SET_LEADCHNL_RV_PACING_ANODE_LOCATION_1: NORMAL
MDC_IDC_SET_LEADCHNL_RV_PACING_CAPTURE_MODE: NORMAL
MDC_IDC_SET_LEADCHNL_RV_PACING_CATHODE_ELECTRODE_1: NORMAL
MDC_IDC_SET_LEADCHNL_RV_PACING_CATHODE_LOCATION_1: NORMAL
MDC_IDC_SET_LEADCHNL_RV_PACING_POLARITY: NORMAL
MDC_IDC_SET_LEADCHNL_RV_PACING_PULSEWIDTH: 0.6 MS
MDC_IDC_SET_LEADCHNL_RV_SENSING_ADAPTATION_MODE: NORMAL
MDC_IDC_SET_LEADCHNL_RV_SENSING_ANODE_ELECTRODE_1: NORMAL
MDC_IDC_SET_LEADCHNL_RV_SENSING_ANODE_LOCATION_1: NORMAL
MDC_IDC_SET_LEADCHNL_RV_SENSING_CATHODE_ELECTRODE_1: NORMAL
MDC_IDC_SET_LEADCHNL_RV_SENSING_CATHODE_LOCATION_1: NORMAL
MDC_IDC_SET_LEADCHNL_RV_SENSING_POLARITY: NORMAL
MDC_IDC_SET_LEADCHNL_RV_SENSING_SENSITIVITY: 2 MV
MDC_IDC_STAT_AT_DTM_END: NORMAL
MDC_IDC_STAT_AT_DTM_START: NORMAL
MDC_IDC_STAT_BRADY_DTM_END: NORMAL
MDC_IDC_STAT_BRADY_DTM_START: NORMAL
MDC_IDC_STAT_BRADY_RV_PERCENT_PACED: 93 %
MDC_IDC_STAT_CRT_DTM_END: NORMAL
MDC_IDC_STAT_CRT_DTM_START: NORMAL
MDC_IDC_STAT_HEART_RATE_DTM_END: NORMAL
MDC_IDC_STAT_HEART_RATE_DTM_START: NORMAL
MDC_IDC_STAT_HEART_RATE_VENTRICULAR_MAX: 240 {BEATS}/MIN
MDC_IDC_STAT_HEART_RATE_VENTRICULAR_MEAN: 71 {BEATS}/MIN
MDC_IDC_STAT_HEART_RATE_VENTRICULAR_MIN: 50 {BEATS}/MIN

## 2022-04-08 ENCOUNTER — TELEPHONE (OUTPATIENT)
Dept: CARDIOLOGY | Facility: CLINIC | Age: 77
End: 2022-04-08
Payer: MEDICARE

## 2022-04-08 NOTE — TELEPHONE ENCOUNTER
----- Message from Erica Leggett sent at 4/7/2022  2:23 PM CDT -----  Dex Marcos,    I was just calling patient to reschedule his [facility] appointment and patient and his spouse stated that they were concerned with how tired patient has been. He is almost 6 mo S/P Clip procedure.  They wanted to speak with you so you could talk with Dr. Campos. Would you mind giving them a call?    I did reschedule his June appt with Dr. Campos to April 21st.      Thank you!  Erica     08-Mar-2020 Walk in

## 2022-04-08 NOTE — TELEPHONE ENCOUNTER
Return call to patient and wife. They have concerns for which they would like to get Dr. Campos's input. Appointment is scheduled already for 4/21/22 and patient has seen PCP within the last week. Will await 4/21/22 appointment with ANIVAL. arsh

## 2022-04-21 ENCOUNTER — OFFICE VISIT (OUTPATIENT)
Dept: CARDIOLOGY | Facility: CLINIC | Age: 77
End: 2022-04-21
Payer: MEDICARE

## 2022-04-21 VITALS
BODY MASS INDEX: 32.15 KG/M2 | WEIGHT: 193.2 LBS | RESPIRATION RATE: 20 BRPM | HEART RATE: 72 BPM | SYSTOLIC BLOOD PRESSURE: 120 MMHG | DIASTOLIC BLOOD PRESSURE: 62 MMHG

## 2022-04-21 DIAGNOSIS — I35.1 NONRHEUMATIC AORTIC VALVE INSUFFICIENCY: ICD-10-CM

## 2022-04-21 DIAGNOSIS — Z95.0 PACEMAKER: ICD-10-CM

## 2022-04-21 DIAGNOSIS — Z98.890 S/P MITRAL VALVE CLIP IMPLANTATION: ICD-10-CM

## 2022-04-21 DIAGNOSIS — I10 ESSENTIAL HYPERTENSION: ICD-10-CM

## 2022-04-21 DIAGNOSIS — I34.0 NONRHEUMATIC MITRAL VALVE REGURGITATION: ICD-10-CM

## 2022-04-21 DIAGNOSIS — Z95.2 S/P AVR (AORTIC VALVE REPLACEMENT): ICD-10-CM

## 2022-04-21 DIAGNOSIS — Z95.818 S/P MITRAL VALVE CLIP IMPLANTATION: ICD-10-CM

## 2022-04-21 DIAGNOSIS — I48.21 PERMANENT ATRIAL FIBRILLATION (H): Primary | ICD-10-CM

## 2022-04-21 LAB
ANION GAP SERPL CALCULATED.3IONS-SCNC: 13 MMOL/L (ref 5–18)
BUN SERPL-MCNC: 41 MG/DL (ref 8–28)
CALCIUM SERPL-MCNC: 10.7 MG/DL (ref 8.5–10.5)
CHLORIDE BLD-SCNC: 104 MMOL/L (ref 98–107)
CO2 SERPL-SCNC: 24 MMOL/L (ref 22–31)
CREAT SERPL-MCNC: 1.32 MG/DL (ref 0.7–1.3)
GFR SERPL CREATININE-BSD FRML MDRD: 56 ML/MIN/1.73M2
GLUCOSE BLD-MCNC: 137 MG/DL (ref 70–125)
POTASSIUM BLD-SCNC: 4 MMOL/L (ref 3.5–5)
SODIUM SERPL-SCNC: 141 MMOL/L (ref 136–145)

## 2022-04-21 PROCEDURE — 36415 COLL VENOUS BLD VENIPUNCTURE: CPT | Performed by: INTERNAL MEDICINE

## 2022-04-21 PROCEDURE — 80048 BASIC METABOLIC PNL TOTAL CA: CPT | Performed by: INTERNAL MEDICINE

## 2022-04-21 PROCEDURE — 99213 OFFICE O/P EST LOW 20 MIN: CPT | Performed by: INTERNAL MEDICINE

## 2022-04-21 NOTE — LETTER
4/21/2022    Salo Lewis MD  [address], [address]  [facility] [address]    RE: Sorin Weeks       Dear Colleague,     I had the pleasure of seeing Sorin Weeks in the [facility].      [facility]  [phone]          Assessment/Recommendations   Patient with known valvular heart disease, status post aortic valve replacement with some paravalvular aortic insufficiency.  He also had severe mitral insufficiency with placement of a MitraClip with initial good success but more recent echocardiogram showing moderate to moderately severe mitral insufficiency.  He does have some fluid retention recently and his diuretic was increased from 40 mg of Lasix a day to 60 mg a day and he is doing better.  Legs are better and breathing is better.  He does believe the mitral clip will improve his functional capacity overall.    We will check a basic metabolic panel today.  I do believe his pulmonary vascular congestion is well controlled at this time and have not made any changes in his medication.  We will call him with the results of the blood work and the recommendations.    We will plan on seeing him back in 6 months but if his breathing changes at all or fluid retention comes up he should call and we will get him in much sooner.    Thank you for allowing us to participate in his care.       History of Present Illness/Subjective    Mr. Sorin Weeks is a 76 year old male with known valvular heart disease with more recent MitraClip but previously had undergone aortic valve replacement.  He does believe the MitraClip improved his functional capacity but recently had fluid retention with lower extremity edema and some fluid in his lungs which was picked up on a CT scan when he had a fall at home.  His diuretic was increased from 40 mg to 60 mg a day.  Overall he feels like his breathing is good and his lower extremity edema improved with this change in his  diuretic.  He has not had any chest discomfort denies orthopnea, paroxysmal nocturnal dyspnea.  No palpitations.  When he had a recent fall he tripped and was not syncopal.           Physical Examination Review of Systems   /62 (BP Location: Left arm, Patient Position: Sitting, Cuff Size: Adult Large)   Pulse 72   Resp 20   Wt 87.6 kg (193 lb 3.2 oz)   BMI 32.15 kg/m    Body mass index is 32.15 kg/m .  Wt Readings from Last 3 Encounters:   04/21/22 87.6 kg (193 lb 3.2 oz)   11/19/21 89.4 kg (197 lb)   10/18/21 88 kg (194 lb)     General Appearance:   Alert, cooperative and in no acute distress.   ENT/Mouth: Patient wearing a mask.      EYES:  no scleral icterus, normal conjunctivae   Neck: JVP 10 cm.  Positive hepatojugular reflux. Thyroid not visualized.   Chest/Lungs:   Lungs are clear to auscultation, equal chest wall expansion.   Cardiovascular:   S1, S2 with 2/6 systolic and 2/6 diastolic murmurs , no clicks or rubs. Brachial, radial  pulses are intact and symetric. No carotid bruits noted   Abdomen:  Nontender. BS+.   Extremities: No cyanosis, clubbing and mild pretibial edema bilaterally   Skin: no xanthelasma, warm.    Neurologic: normal arm movement bilateral, no tremors     Psychiatric: Appropriate affect.      Enc Vitals  BP: 120/62  Pulse: 72  Resp: 20  Weight: 87.6 kg (193 lb 3.2 oz)                                           Medical History  Surgical History Family History Social History   Past Medical History:   Diagnosis Date     Diabetes mellitus (H)      Hypertension     Past Surgical History:   Procedure Laterality Date     APPENDECTOMY       CARDIAC CATHETERIZATION       CV CORONARY ANGIOGRAM N/A 7/30/2021    Procedure: Coronary Angiogram;  Surgeon: Leonel Romero MD;  Location: [facility] CATH LAB CV     CV LEFT HEART CATH N/A 7/30/2021    Procedure: Left Heart Cath;  Surgeon: Leonel Romero MD;  Location: [facility] CATH LAB CV     CV RIGHT HEART CATH MEASUREMENTS RECORDED N/A  7/30/2021    Procedure: Right Heart Cath;  Surgeon: Leonel Romero MD;  Location: [facility] CATH LAB CV     CV SUPRAVALVULAR AORTOGRAM N/A 7/30/2021    Procedure: Supravalvular Aortagram;  Surgeon: Leonel Romero MD;  Location: [facility] CATH LAB CV     INSERT / REPLACE / REMOVE PACEMAKER       TOTAL SHOULDER REPLACEMENT Left      TRANSCATHETER MITRAL VALVE REPAIR N/A 10/4/2021    Procedure: CV MITRAL CLIP;  Surgeon: Jacqueline Chilel MD;  Location:  HEART CARDIAC CATH LAB     ZZC REPLACE AORT VALV,PROSTH VALV      Description: Aortic Valve Replacement;  Recorded: 06/05/2013;    No family history on file. Social History     Socioeconomic History     Marital status:      Spouse name: Not on file     Number of children: Not on file     Years of education: Not on file     Highest education level: Not on file   Occupational History     Not on file   Tobacco Use     Smoking status: Former Smoker     Types: Cigarettes     Smokeless tobacco: Never Used   Substance and Sexual Activity     Alcohol use: Never     Drug use: Never     Sexual activity: Not on file   Other Topics Concern     Not on file   Social History Narrative     Not on file     Social Determinants of Health     Financial Resource Strain: Not on file   Food Insecurity: Not on file   Transportation Needs: Not on file   Physical Activity: Not on file   Stress: Not on file   Social Connections: Not on file   Intimate Partner Violence: Not on file   Housing Stability: Not on file          Medications  Allergies   Current Outpatient Medications   Medication Sig Dispense Refill     finasteride (PROSCAR) 5 mg tablet Take 5 mg by mouth daily   5     furosemide (LASIX) 20 MG tablet Take 2 tablets (40 mg) by mouth daily (Patient taking differently: Take 40 mg by mouth daily Taking 2 tablets in the morning, 1 tablet at noon) 180 tablet 1     glipiZIDE (GLUCOTROL) 10 MG 24 hr tablet [GLIPIZIDE (GLUCOTROL) 10 MG 24 HR TABLET] Take 10 mg by mouth daily.        "lisinopril-hydrochlorothiazide (PRINZIDE,ZESTORETIC) 20-12.5 mg per tablet [LISINOPRIL-HYDROCHLOROTHIAZIDE (PRINZIDE,ZESTORETIC) 20-12.5 MG PER TABLET] Take 1 tablet by mouth daily.  0     metFORMIN (GLUCOPHAGE) 500 MG tablet Take 500 mg by mouth 2 times daily (with meals)        metoprolol succinate (TOPROL-XL) 100 MG 24 hr tablet [METOPROLOL SUCCINATE (TOPROL-XL) 100 MG 24 HR TABLET] Take 100 mg by mouth daily.       multivit,calc,mins/folic acid (ONE-A-DAY PROACTIVE 65 PLUS ORAL) [MULTIVIT,CALC,MINS/FOLIC ACID (ONE-A-DAY PROACTIVE 65 PLUS ORAL)] Take 1 tablet by mouth daily.       tamsulosin (FLOMAX) 0.4 mg Cp24 Take 0.4 mg by mouth daily (with breakfast)   5     warfarin (COUMADIN) 5 MG tablet 5 MG daily except on Tuesday's patient takes 1 & 1/2 (7.5 MG).      Allergies   Allergen Reactions     Blood Transfusion Related (Informational Only) Other (See Comments)     Patient has a history of a clinically significant antibody against RBC antigens.  A delay in compatible RBCs may occur.      Atenolol Unknown     Blood-Group Specific Substance      Anti-Jka present. Please allow up to 3 HRS for blood for transfusion. Draw 2 EDTA for all Type and Screen orders.     Buprenorphine Hcl [Buprenorphine] Other (See Comments)     pt. felt \"loopy\"     Codeine Unknown     Penicillins Unknown     Sulfa (Sulfonamide Antibiotics) [Sulfa Drugs] Unknown     Vancomycin Other (See Comments)     \"shuts down kidneys. Destroying venin\" per wife.         Lab Results    Chemistry/lipid CBC Cardiac Enzymes/BNP/TSH/INR   Lab Results   Component Value Date    CHOL 128 07/30/2021    HDL 28 (L) 07/30/2021    TRIG 129 07/30/2021    BUN 35 (H) 11/26/2021     11/26/2021    CO2 24 11/26/2021    Lab Results   Component Value Date    WBC 6.3 11/19/2021    HGB 12.2 (L) 11/19/2021    HCT 37.6 (L) 11/19/2021    MCV 98 11/19/2021     11/19/2021    Lab Results   Component Value Date    BNP 1,129 (H) 09/23/2021    INR 1.24 (H) 10/05/2021    "           Thank you for allowing me to participate in the care of your patient.      Sincerely,     Jhony Campos MD     [facility]  cc:   Referred Self

## 2022-04-21 NOTE — PROGRESS NOTES
[facility]  [phone]          Assessment/Recommendations   Patient with known valvular heart disease, status post aortic valve replacement with some paravalvular aortic insufficiency.  He also had severe mitral insufficiency with placement of a MitraClip with initial good success but more recent echocardiogram showing moderate to moderately severe mitral insufficiency.  He does have some fluid retention recently and his diuretic was increased from 40 mg of Lasix a day to 60 mg a day and he is doing better.  Legs are better and breathing is better.  He does believe the mitral clip will improve his functional capacity overall.    We will check a basic metabolic panel today.  I do believe his pulmonary vascular congestion is well controlled at this time and have not made any changes in his medication.  We will call him with the results of the blood work and the recommendations.    We will plan on seeing him back in 6 months but if his breathing changes at all or fluid retention comes up he should call and we will get him in much sooner.    Thank you for allowing us to participate in his care.       History of Present Illness/Subjective    Mr. Sorin Weeks is a 76 year old male with known valvular heart disease with more recent MitraClip but previously had undergone aortic valve replacement.  He does believe the MitraClip improved his functional capacity but recently had fluid retention with lower extremity edema and some fluid in his lungs which was picked up on a CT scan when he had a fall at home.  His diuretic was increased from 40 mg to 60 mg a day.  Overall he feels like his breathing is good and his lower extremity edema improved with this change in his diuretic.  He has not had any chest discomfort denies orthopnea, paroxysmal nocturnal dyspnea.  No palpitations.  When he had a recent fall he tripped and was not syncopal.           Physical Examination Review of Systems   BP  120/62 (BP Location: Left arm, Patient Position: Sitting, Cuff Size: Adult Large)   Pulse 72   Resp 20   Wt 87.6 kg (193 lb 3.2 oz)   BMI 32.15 kg/m    Body mass index is 32.15 kg/m .  Wt Readings from Last 3 Encounters:   04/21/22 87.6 kg (193 lb 3.2 oz)   11/19/21 89.4 kg (197 lb)   10/18/21 88 kg (194 lb)     General Appearance:   Alert, cooperative and in no acute distress.   ENT/Mouth: Patient wearing a mask.      EYES:  no scleral icterus, normal conjunctivae   Neck: JVP 10 cm.  Positive hepatojugular reflux. Thyroid not visualized.   Chest/Lungs:   Lungs are clear to auscultation, equal chest wall expansion.   Cardiovascular:   S1, S2 with 2/6 systolic and 2/6 diastolic murmurs , no clicks or rubs. Brachial, radial  pulses are intact and symetric. No carotid bruits noted   Abdomen:  Nontender. BS+.   Extremities: No cyanosis, clubbing and mild pretibial edema bilaterally   Skin: no xanthelasma, warm.    Neurologic: normal arm movement bilateral, no tremors     Psychiatric: Appropriate affect.      Enc Vitals  BP: 120/62  Pulse: 72  Resp: 20  Weight: 87.6 kg (193 lb 3.2 oz)                                           Medical History  Surgical History Family History Social History   Past Medical History:   Diagnosis Date     Diabetes mellitus (H)      Hypertension     Past Surgical History:   Procedure Laterality Date     APPENDECTOMY       CARDIAC CATHETERIZATION       CV CORONARY ANGIOGRAM N/A 7/30/2021    Procedure: Coronary Angiogram;  Surgeon: Leonel Romero MD;  Location: [facility] CATH LAB CV     CV LEFT HEART CATH N/A 7/30/2021    Procedure: Left Heart Cath;  Surgeon: Leonel Romero MD;  Location: [facility] CATH LAB CV     CV RIGHT HEART CATH MEASUREMENTS RECORDED N/A 7/30/2021    Procedure: Right Heart Cath;  Surgeon: Leonel Romero MD;  Location: [facility] CATH LAB CV     CV SUPRAVALVULAR AORTOGRAM N/A 7/30/2021    Procedure: Supravalvular Aortagram;  Surgeon: Leonel Romero MD;   Location: [facility] CATH LAB CV     INSERT / REPLACE / REMOVE PACEMAKER       TOTAL SHOULDER REPLACEMENT Left      TRANSCATHETER MITRAL VALVE REPAIR N/A 10/4/2021    Procedure: CV MITRAL CLIP;  Surgeon: Jacqueline Chilel MD;  Location:  HEART CARDIAC CATH LAB     ZC REPLACE AORT VALV,PROSTH VALV      Description: Aortic Valve Replacement;  Recorded: 06/05/2013;    No family history on file. Social History     Socioeconomic History     Marital status:      Spouse name: Not on file     Number of children: Not on file     Years of education: Not on file     Highest education level: Not on file   Occupational History     Not on file   Tobacco Use     Smoking status: Former Smoker     Types: Cigarettes     Smokeless tobacco: Never Used   Substance and Sexual Activity     Alcohol use: Never     Drug use: Never     Sexual activity: Not on file   Other Topics Concern     Not on file   Social History Narrative     Not on file     Social Determinants of Health     Financial Resource Strain: Not on file   Food Insecurity: Not on file   Transportation Needs: Not on file   Physical Activity: Not on file   Stress: Not on file   Social Connections: Not on file   Intimate Partner Violence: Not on file   Housing Stability: Not on file          Medications  Allergies   Current Outpatient Medications   Medication Sig Dispense Refill     finasteride (PROSCAR) 5 mg tablet Take 5 mg by mouth daily   5     furosemide (LASIX) 20 MG tablet Take 2 tablets (40 mg) by mouth daily (Patient taking differently: Take 40 mg by mouth daily Taking 2 tablets in the morning, 1 tablet at noon) 180 tablet 1     glipiZIDE (GLUCOTROL) 10 MG 24 hr tablet [GLIPIZIDE (GLUCOTROL) 10 MG 24 HR TABLET] Take 10 mg by mouth daily.       lisinopril-hydrochlorothiazide (PRINZIDE,ZESTORETIC) 20-12.5 mg per tablet [LISINOPRIL-HYDROCHLOROTHIAZIDE (PRINZIDE,ZESTORETIC) 20-12.5 MG PER TABLET] Take 1 tablet by mouth daily.  0     metFORMIN (GLUCOPHAGE) 500 MG  "tablet Take 500 mg by mouth 2 times daily (with meals)        metoprolol succinate (TOPROL-XL) 100 MG 24 hr tablet [METOPROLOL SUCCINATE (TOPROL-XL) 100 MG 24 HR TABLET] Take 100 mg by mouth daily.       multivit,calc,mins/folic acid (ONE-A-DAY PROACTIVE 65 PLUS ORAL) [MULTIVIT,CALC,MINS/FOLIC ACID (ONE-A-DAY PROACTIVE 65 PLUS ORAL)] Take 1 tablet by mouth daily.       tamsulosin (FLOMAX) 0.4 mg Cp24 Take 0.4 mg by mouth daily (with breakfast)   5     warfarin (COUMADIN) 5 MG tablet 5 MG daily except on Tuesday's patient takes 1 & 1/2 (7.5 MG).      Allergies   Allergen Reactions     Blood Transfusion Related (Informational Only) Other (See Comments)     Patient has a history of a clinically significant antibody against RBC antigens.  A delay in compatible RBCs may occur.      Atenolol Unknown     Blood-Group Specific Substance      Anti-Jka present. Please allow up to 3 HRS for blood for transfusion. Draw 2 EDTA for all Type and Screen orders.     Buprenorphine Hcl [Buprenorphine] Other (See Comments)     pt. felt \"loopy\"     Codeine Unknown     Penicillins Unknown     Sulfa (Sulfonamide Antibiotics) [Sulfa Drugs] Unknown     Vancomycin Other (See Comments)     \"shuts down kidneys. Destroying venin\" per wife.         Lab Results    Chemistry/lipid CBC Cardiac Enzymes/BNP/TSH/INR   Lab Results   Component Value Date    CHOL 128 07/30/2021    HDL 28 (L) 07/30/2021    TRIG 129 07/30/2021    BUN 35 (H) 11/26/2021     11/26/2021    CO2 24 11/26/2021    Lab Results   Component Value Date    WBC 6.3 11/19/2021    HGB 12.2 (L) 11/19/2021    HCT 37.6 (L) 11/19/2021    MCV 98 11/19/2021     11/19/2021    Lab Results   Component Value Date    BNP 1,129 (H) 09/23/2021    INR 1.24 (H) 10/05/2021                                           "

## 2022-06-05 ENCOUNTER — HEALTH MAINTENANCE LETTER (OUTPATIENT)
Age: 77
End: 2022-06-05

## 2022-07-12 DIAGNOSIS — Z98.890 S/P MITRAL VALVE CLIP IMPLANTATION: Primary | ICD-10-CM

## 2022-07-12 DIAGNOSIS — Z95.818 S/P MITRAL VALVE CLIP IMPLANTATION: Primary | ICD-10-CM

## 2022-07-21 ENCOUNTER — ANCILLARY PROCEDURE (OUTPATIENT)
Dept: CARDIOLOGY | Facility: CLINIC | Age: 77
End: 2022-07-21
Attending: INTERNAL MEDICINE
Payer: MEDICARE

## 2022-07-21 DIAGNOSIS — Z95.0 PACEMAKER: ICD-10-CM

## 2022-07-21 DIAGNOSIS — I44.0 FIRST DEGREE AV BLOCK: ICD-10-CM

## 2022-07-21 PROCEDURE — 93296 REM INTERROG EVL PM/IDS: CPT | Performed by: INTERNAL MEDICINE

## 2022-07-21 PROCEDURE — 93294 REM INTERROG EVL PM/LDLS PM: CPT | Performed by: INTERNAL MEDICINE

## 2022-07-23 LAB
MDC_IDC_LEAD_IMPLANT_DT: NORMAL
MDC_IDC_LEAD_LOCATION: NORMAL
MDC_IDC_LEAD_LOCATION_DETAIL_1: NORMAL
MDC_IDC_LEAD_MFG: NORMAL
MDC_IDC_LEAD_MODEL: NORMAL
MDC_IDC_LEAD_POLARITY_TYPE: NORMAL
MDC_IDC_LEAD_SERIAL: NORMAL
MDC_IDC_MSMT_BATTERY_DTM: NORMAL
MDC_IDC_MSMT_BATTERY_REMAINING_LONGEVITY: 38 MO
MDC_IDC_MSMT_BATTERY_REMAINING_PERCENTAGE: 29 %
MDC_IDC_MSMT_BATTERY_RRT_TRIGGER: NORMAL
MDC_IDC_MSMT_BATTERY_STATUS: NORMAL
MDC_IDC_MSMT_BATTERY_VOLTAGE: 2.86 V
MDC_IDC_MSMT_LEADCHNL_RV_IMPEDANCE_VALUE: 430 OHM
MDC_IDC_MSMT_LEADCHNL_RV_LEAD_CHANNEL_STATUS: NORMAL
MDC_IDC_MSMT_LEADCHNL_RV_PACING_THRESHOLD_AMPLITUDE: 0.88 V
MDC_IDC_MSMT_LEADCHNL_RV_PACING_THRESHOLD_PULSEWIDTH: 0.6 MS
MDC_IDC_MSMT_LEADCHNL_RV_SENSING_INTR_AMPL: 7.4 MV
MDC_IDC_PG_IMPLANT_DTM: NORMAL
MDC_IDC_PG_MFG: NORMAL
MDC_IDC_PG_MODEL: NORMAL
MDC_IDC_PG_SERIAL: NORMAL
MDC_IDC_PG_TYPE: NORMAL
MDC_IDC_SESS_CLINIC_NAME: NORMAL
MDC_IDC_SESS_DTM: NORMAL
MDC_IDC_SESS_REPROGRAMMED: NO
MDC_IDC_SESS_TYPE: NORMAL
MDC_IDC_SET_BRADY_LOWRATE: 60 {BEATS}/MIN
MDC_IDC_SET_BRADY_MAX_SENSOR_RATE: 130 {BEATS}/MIN
MDC_IDC_SET_BRADY_MODE: NORMAL
MDC_IDC_SET_LEADCHNL_RV_PACING_AMPLITUDE: 1.12
MDC_IDC_SET_LEADCHNL_RV_PACING_ANODE_ELECTRODE_1: NORMAL
MDC_IDC_SET_LEADCHNL_RV_PACING_ANODE_LOCATION_1: NORMAL
MDC_IDC_SET_LEADCHNL_RV_PACING_CAPTURE_MODE: NORMAL
MDC_IDC_SET_LEADCHNL_RV_PACING_CATHODE_ELECTRODE_1: NORMAL
MDC_IDC_SET_LEADCHNL_RV_PACING_CATHODE_LOCATION_1: NORMAL
MDC_IDC_SET_LEADCHNL_RV_PACING_POLARITY: NORMAL
MDC_IDC_SET_LEADCHNL_RV_PACING_PULSEWIDTH: 0.6 MS
MDC_IDC_SET_LEADCHNL_RV_SENSING_ADAPTATION_MODE: NORMAL
MDC_IDC_SET_LEADCHNL_RV_SENSING_ANODE_ELECTRODE_1: NORMAL
MDC_IDC_SET_LEADCHNL_RV_SENSING_ANODE_LOCATION_1: NORMAL
MDC_IDC_SET_LEADCHNL_RV_SENSING_CATHODE_ELECTRODE_1: NORMAL
MDC_IDC_SET_LEADCHNL_RV_SENSING_CATHODE_LOCATION_1: NORMAL
MDC_IDC_SET_LEADCHNL_RV_SENSING_POLARITY: NORMAL
MDC_IDC_SET_LEADCHNL_RV_SENSING_SENSITIVITY: 2 MV
MDC_IDC_STAT_AT_DTM_END: NORMAL
MDC_IDC_STAT_AT_DTM_START: NORMAL
MDC_IDC_STAT_BRADY_DTM_END: NORMAL
MDC_IDC_STAT_BRADY_DTM_START: NORMAL
MDC_IDC_STAT_BRADY_RV_PERCENT_PACED: 94 %
MDC_IDC_STAT_CRT_DTM_END: NORMAL
MDC_IDC_STAT_CRT_DTM_START: NORMAL
MDC_IDC_STAT_HEART_RATE_DTM_END: NORMAL
MDC_IDC_STAT_HEART_RATE_DTM_START: NORMAL
MDC_IDC_STAT_HEART_RATE_VENTRICULAR_MAX: 240 {BEATS}/MIN
MDC_IDC_STAT_HEART_RATE_VENTRICULAR_MEAN: 71 {BEATS}/MIN
MDC_IDC_STAT_HEART_RATE_VENTRICULAR_MIN: 50 {BEATS}/MIN

## 2022-07-30 ENCOUNTER — TRANSFERRED RECORDS (OUTPATIENT)
Dept: HEALTH INFORMATION MANAGEMENT | Facility: CLINIC | Age: 77
End: 2022-07-30

## 2022-07-30 LAB
CREATININE (EXTERNAL): 1.69 MG/DL (ref 0.55–1.18)
GLUCOSE (EXTERNAL): 249 MG/DL (ref 70–100)
INR (EXTERNAL): 1.3 (ref 0.9–1.1)
INR (EXTERNAL): 2.3 (ref 0.9–1.1)
POTASSIUM (EXTERNAL): 4.6 MMOL/L (ref 3.5–5.1)

## 2022-07-31 LAB
ALT SERPL-CCNC: 10 U/L (ref 0–55)
AST SERPL-CCNC: 12 U/L (ref 10–40)
CREATININE (EXTERNAL): 1.45 MG/DL (ref 0.55–1.18)
CREATININE (EXTERNAL): 1.52 MG/DL (ref 0.55–1.18)
GLUCOSE (EXTERNAL): 102 MG/DL (ref 70–100)
GLUCOSE (EXTERNAL): 115 MG/DL (ref 70–180)
GLUCOSE (EXTERNAL): 142 MG/DL (ref 70–180)
GLUCOSE (EXTERNAL): 143 MG/DL (ref 70–180)
GLUCOSE (EXTERNAL): 144 MG/DL (ref 70–180)
GLUCOSE (EXTERNAL): 172 MG/DL (ref 70–180)
INR (EXTERNAL): 1.3 (ref 0.9–1.1)
POTASSIUM (EXTERNAL): 4.2 MMOL/L (ref 3.5–5.1)

## 2022-08-01 LAB
CREATININE (EXTERNAL): 1.38 MG/DL (ref 0.73–1.18)
GFR ESTIMATED (EXTERNAL): 53 ML/MIN/1.73M2
GLUCOSE (EXTERNAL): 150 MG/DL (ref 70–100)
GLUCOSE (EXTERNAL): 180 MG/DL (ref 70–180)
INR (EXTERNAL): 1.3 (ref 0.9–1.1)
POTASSIUM (EXTERNAL): 4.3 MMOL/L (ref 3.5–5.1)

## 2022-08-02 LAB
CREATININE (EXTERNAL): 1.25 MG/DL (ref 0.73–1.18)
GFR ESTIMATED (EXTERNAL): 60 ML/MIN/1.73M2
GLUCOSE (EXTERNAL): 155 MG/DL (ref 70–100)
GLUCOSE (EXTERNAL): 213 MG/DL (ref 70–180)
INR (EXTERNAL): 1.2 (ref 0.9–1.1)
POTASSIUM (EXTERNAL): 4.1 MMOL/L (ref 3.5–5.1)

## 2022-10-04 ENCOUNTER — HOSPITAL ENCOUNTER (OUTPATIENT)
Dept: CARDIOLOGY | Facility: CLINIC | Age: 77
Discharge: HOME | End: 2022-10-04
Attending: INTERNAL MEDICINE | Admitting: INTERNAL MEDICINE
Payer: MEDICARE

## 2022-10-04 DIAGNOSIS — Z95.818 S/P MITRAL VALVE CLIP IMPLANTATION: Primary | ICD-10-CM

## 2022-10-04 DIAGNOSIS — Z95.818 S/P MITRAL VALVE CLIP IMPLANTATION: ICD-10-CM

## 2022-10-04 DIAGNOSIS — Z98.890 S/P MITRAL VALVE CLIP IMPLANTATION: Primary | ICD-10-CM

## 2022-10-04 DIAGNOSIS — I34.0 MITRAL VALVE INSUFFICIENCY, UNSPECIFIED ETIOLOGY: ICD-10-CM

## 2022-10-04 DIAGNOSIS — Z98.890 S/P MITRAL VALVE CLIP IMPLANTATION: ICD-10-CM

## 2022-10-04 LAB — LVEF ECHO: NORMAL

## 2022-10-04 PROCEDURE — 93306 TTE W/DOPPLER COMPLETE: CPT

## 2022-10-04 PROCEDURE — 93306 TTE W/DOPPLER COMPLETE: CPT | Mod: 26 | Performed by: INTERNAL MEDICINE

## 2022-10-07 ENCOUNTER — OFFICE VISIT (OUTPATIENT)
Dept: CARDIOLOGY | Facility: CLINIC | Age: 77
End: 2022-10-07
Payer: MEDICARE

## 2022-10-07 VITALS
DIASTOLIC BLOOD PRESSURE: 50 MMHG | HEART RATE: 62 BPM | SYSTOLIC BLOOD PRESSURE: 110 MMHG | WEIGHT: 179 LBS | HEIGHT: 65 IN | BODY MASS INDEX: 29.82 KG/M2 | RESPIRATION RATE: 16 BRPM

## 2022-10-07 DIAGNOSIS — I48.21 PERMANENT ATRIAL FIBRILLATION (H): ICD-10-CM

## 2022-10-07 DIAGNOSIS — I35.9 AORTIC VALVE DISORDER: ICD-10-CM

## 2022-10-07 DIAGNOSIS — Z95.2 S/P AVR (AORTIC VALVE REPLACEMENT): ICD-10-CM

## 2022-10-07 DIAGNOSIS — G47.33 OBSTRUCTIVE SLEEP APNEA: Primary | ICD-10-CM

## 2022-10-07 DIAGNOSIS — I34.0 MITRAL VALVE INSUFFICIENCY, UNSPECIFIED ETIOLOGY: ICD-10-CM

## 2022-10-07 DIAGNOSIS — Z98.890 S/P MITRAL VALVE CLIP IMPLANTATION: ICD-10-CM

## 2022-10-07 DIAGNOSIS — E11.9 TYPE 2 DIABETES MELLITUS WITHOUT COMPLICATION, WITHOUT LONG-TERM CURRENT USE OF INSULIN (H): ICD-10-CM

## 2022-10-07 DIAGNOSIS — I10 ESSENTIAL HYPERTENSION: ICD-10-CM

## 2022-10-07 DIAGNOSIS — Z95.818 S/P MITRAL VALVE CLIP IMPLANTATION: ICD-10-CM

## 2022-10-07 LAB
ANION GAP SERPL CALCULATED.3IONS-SCNC: 14 MMOL/L (ref 7–15)
BUN SERPL-MCNC: 73.9 MG/DL (ref 8–23)
CALCIUM SERPL-MCNC: 10.4 MG/DL (ref 8.8–10.2)
CHLORIDE SERPL-SCNC: 101 MMOL/L (ref 98–107)
CREAT SERPL-MCNC: 1.53 MG/DL (ref 0.67–1.17)
DEPRECATED HCO3 PLAS-SCNC: 24 MMOL/L (ref 22–29)
ERYTHROCYTE [DISTWIDTH] IN BLOOD BY AUTOMATED COUNT: 15.8 % (ref 10–15)
GFR SERPL CREATININE-BSD FRML MDRD: 47 ML/MIN/1.73M2
GLUCOSE SERPL-MCNC: 223 MG/DL (ref 70–99)
HCT VFR BLD AUTO: 24.4 % (ref 40–53)
HGB BLD-MCNC: 7.8 G/DL (ref 13.3–17.7)
MCH RBC QN AUTO: 32.4 PG (ref 26.5–33)
MCHC RBC AUTO-ENTMCNC: 32 G/DL (ref 31.5–36.5)
MCV RBC AUTO: 101 FL (ref 78–100)
PLATELET # BLD AUTO: 131 10E3/UL (ref 150–450)
POTASSIUM SERPL-SCNC: 4.6 MMOL/L (ref 3.4–5.3)
RBC # BLD AUTO: 2.41 10E6/UL (ref 4.4–5.9)
SODIUM SERPL-SCNC: 139 MMOL/L (ref 136–145)
WBC # BLD AUTO: 4.7 10E3/UL (ref 4–11)

## 2022-10-07 PROCEDURE — 36415 COLL VENOUS BLD VENIPUNCTURE: CPT | Performed by: INTERNAL MEDICINE

## 2022-10-07 PROCEDURE — 99214 OFFICE O/P EST MOD 30 MIN: CPT | Performed by: INTERNAL MEDICINE

## 2022-10-07 PROCEDURE — 80048 BASIC METABOLIC PNL TOTAL CA: CPT | Performed by: INTERNAL MEDICINE

## 2022-10-07 PROCEDURE — 85027 COMPLETE CBC AUTOMATED: CPT | Performed by: INTERNAL MEDICINE

## 2022-10-07 RX ORDER — BLOOD-GLUCOSE METER
EACH MISCELLANEOUS
COMMUNITY
Start: 2022-08-23

## 2022-10-07 RX ORDER — ACETAMINOPHEN 500 MG
1000 TABLET ORAL 2 TIMES DAILY
COMMUNITY
Start: 2022-08-02

## 2022-10-07 NOTE — PATIENT INSTRUCTIONS
Sorin Weeks,    It was a pleasure to see you today in the clinic regarding your 1 year MitraClip visit.     My recommendations after this visit include:     - no changes to medications  - continue activity    You should followup with Dr. Campos in April      If you have questions or concerns, please call using the numbers below:    Valve Clinic Pool  [phone]    After Hours/Scheduling  [phone]    Otherwise you can dial the nurse directly at:    Keerthi Kaufman RN  [phone]    Alan Vargas RN  [phone]

## 2022-10-07 NOTE — PROGRESS NOTES
HEART CARE ENCOUNTER NOTE       [facility]  [phone]    Assessment/Recommendations   Diagnoses and all orders for this visit:    Hx of severe Mitral Regurgitation - he is now 1 year s/p MitraClip implantation with 2 clips.   He has had significant improvement in symptoms and overall functional staus     Chronic combined systolic and diastolic heart failure, NYHA class II -significantly improved with decreased lower extremity edema and no shortness of breath currently.  Continue Lasix 40 mg in AM and 20 mg at noon, as well as beta-blocker therapy and afterload reduction.    Type 2 diabetes mellitus -noninsulin dependent.  Patient takes Metformin and glipizide.  He has lost about 20 pounds in the past year and his last hemoglobin A1c was 4.5     Hypertension -blood pressure today is controlled.  Patient will continue taking lisinopril/hydrochlorothiazide 20/12.5 mg daily and metoprolol succinate 100 mg daily     Single-vessel coronary artery disease -asymptomatic.  Continue medical therapy.     History of severe aortic stenosis - s/p mechanical aortic valve replacement.  With mild PVL.     Thank you for the opportunity to participate in the care of Sorin Weeks. It's been a pleasure working with him.  Please do not hesitate to call with any questions or concerns.       History of Present Illness/Subjective    I had the opportunity to see Sorin Weeks at the [facility] for his 1 year follow-up visit after transcatheter mitral valve repair with MitraClip x2.  His wife accompanies him to the visit today.    Stephen has a history of valvular heart disease and underwent mechanical aortic valve replacement in 1996.  He has some mild PVL that has been watched over several years.  He also has mitral regurgitation, combined systolic and diastolic heart failure, diabetes mellitus, hypertension, osteoarthritis and obstructive sleep apnea.      He underwent transcatheter mitral  valve repair using 2 MitraClip's on October 4, 2021.  He improvement in symptoms early on, but was retaining fluid when he last saw Dr. Campos in April.  His Lasix was increased and this has helped significantly.  He has no more shortness of breath.  He does get mildly winded after walking the dogs, but can walk stairs without difficulty.    Stephen had 2 major falls in the past year.  One was this spring when he fell off the back deck at home and cracked a tooth.  The second was at the end of [place]  [place] resulted in a lacerated liver and broken ribs.  He was at [facility] for quite some time and required surgery.  He also tells me that in the last year since his clip, he has lost about 20 pounds    Sorin Weeks denies exertional chest discomfort, palpitations, shortness of breath at rest, PND, orthopnea, lightheadedness, dizziness, pre-syncope or syncope.  Sorin Weeks also denies any recent weight loss, changes in appetite, nausea or vomiting.   ____________________________________________________________  Echo from October 4:  Interpretation Summary     Left ventricular function is normal.The ejection fraction is 55-60%.  Mildly decreased right ventricular systolic function  Severe biatrial enlargement.  MitraClip is present with stable bileaflet insertion. There is mild mitral  stenosis with a mean gradient of 4 mmHg and probable mild/moderate highly  eccentric regurgitation.  There is a mechanical aortic valve. The prosthetic valve gradients are normal.  There is moderate paravalvular regurgitation present.  The right ventricular systolic pressure is approximated at 48mmHg plus the  right atrial pressure.  Compared to prior study, there is no significant change.     Physical Examination Review of Systems   Vitals: /50 (BP Location: Left arm, Patient Position: Sitting, Cuff Size: Adult Regular)   Pulse 62   Resp 16   Ht  "1.651 m (5' 5\")   Wt 81.2 kg (179 lb)   BMI 29.79 kg/m    BMI= Body mass index is 29.79 kg/m .  Wt Readings from Last 3 Encounters:   10/07/22 81.2 kg (179 lb)   04/21/22 87.6 kg (193 lb 3.2 oz)   11/19/21 89.4 kg (197 lb)       General Appearance:   Alert, cooperative and in no acute distress   ENT/Mouth: membranes moist, no oral lesions or bleeding gums.      EYES:  no scleral icterus, normal conjunctivae   Neck: Supple without lymphadenopathy.  Thyroid not visualized   Chest/Lungs:   lungs are clear to auscultation, no rales or wheezing   Cardiovascular:   Regular. Normal first and second heart sounds with no murmurs, rubs, or gallops; the carotid, radial and posterior tibial pulses are intact, minimal edema bilaterally with compression stockings in place   Abdomen:  Soft and nontender. Bowel sounds are present in all quadrants   Extremities: no cyanosis or clubbing.  Puncture sites are still significantly bruised, but all soft with no hematoma.  Closure device palpated in right groin.    Skin: no xanthelasma, warm.    Neurologic: normal gait, normal  bilateral, no tremors   Psychiatric: Normal mood and affect       Please refer above for cardiac ROS details.      Medical History  Surgical History Family History Social History   Past Medical History:   Diagnosis Date     Diabetes mellitus (H)      Hypertension      Past Surgical History:   Procedure Laterality Date     APPENDECTOMY       CARDIAC CATHETERIZATION       CV CORONARY ANGIOGRAM N/A 7/30/2021    Procedure: Coronary Angiogram;  Surgeon: Leonel Romero MD;  Location: [facility] CATH LAB CV     CV LEFT HEART CATH N/A 7/30/2021    Procedure: Left Heart Cath;  Surgeon: Leonel Romero MD;  Location: [facility] CATH LAB CV     CV RIGHT HEART CATH MEASUREMENTS RECORDED N/A 7/30/2021    Procedure: Right Heart Cath;  Surgeon: Leonel Romero MD;  Location: [facility] CATH LAB CV     CV SUPRAVALVULAR AORTOGRAM N/A 7/30/2021    Procedure: Supravalvular " Aortagram;  Surgeon: Leonel Romero MD;  Location: [facility] CATH LAB CV     INSERT / REPLACE / REMOVE PACEMAKER       TOTAL SHOULDER REPLACEMENT Left      TRANSCATHETER MITRAL VALVE REPAIR N/A 10/4/2021    Procedure: CV MITRAL CLIP;  Surgeon: Jacqueline Chilel MD;  Location:  HEART CARDIAC CATH LAB     ZZC REPLACE AORT VALV,PROSTH VALV      Description: Aortic Valve Replacement;  Recorded: 06/05/2013;     No family history on file. Social History     Socioeconomic History     Marital status:      Spouse name: Not on file     Number of children: Not on file     Years of education: Not on file     Highest education level: Not on file   Occupational History     Not on file   Tobacco Use     Smoking status: Former Smoker     Types: Cigarettes     Smokeless tobacco: Never Used   Substance and Sexual Activity     Alcohol use: Never     Drug use: Never     Sexual activity: Not on file   Other Topics Concern     Not on file   Social History Narrative     Not on file     Social Determinants of Health     Financial Resource Strain: Not on file   Food Insecurity: Not on file   Transportation Needs: Not on file   Physical Activity: Not on file   Stress: Not on file   Social Connections: Not on file   Intimate Partner Violence: Not on file   Housing Stability: Not on file          Medications  Allergies   Current Outpatient Medications   Medication Sig Dispense Refill     acetaminophen (TYLENOL) 500 MG tablet Take 1,000 mg by mouth 2 times daily       EMBRACE PRO GLUCOSE TEST test strip USE TO TEST TWICE A DAY       finasteride (PROSCAR) 5 mg tablet Take 5 mg by mouth daily   5     furosemide (LASIX) 20 MG tablet Take 2 tablets (40 mg) by mouth daily (Patient taking differently: Take 40 mg by mouth daily Taking 2 tablets in the morning, 1 tablet at noon) 180 tablet 1     glipiZIDE (GLUCOTROL) 10 MG 24 hr tablet [GLIPIZIDE (GLUCOTROL) 10 MG 24 HR TABLET] Take 10 mg by mouth daily.       lisinopril-hydrochlorothiazide  "(PRINZIDE,ZESTORETIC) 20-12.5 mg per tablet [LISINOPRIL-HYDROCHLOROTHIAZIDE (PRINZIDE,ZESTORETIC) 20-12.5 MG PER TABLET] Take 1 tablet by mouth daily.  0     metFORMIN (GLUCOPHAGE) 500 MG tablet Take 500 mg by mouth 2 times daily (with meals)        metoprolol succinate (TOPROL-XL) 100 MG 24 hr tablet [METOPROLOL SUCCINATE (TOPROL-XL) 100 MG 24 HR TABLET] Take 100 mg by mouth daily.       multivit,calc,mins/folic acid (ONE-A-DAY PROACTIVE 65 PLUS ORAL) [MULTIVIT,CALC,MINS/FOLIC ACID (ONE-A-DAY PROACTIVE 65 PLUS ORAL)] Take 1 tablet by mouth daily.       tamsulosin (FLOMAX) 0.4 mg Cp24 Take 0.4 mg by mouth daily (with breakfast)   5     warfarin (COUMADIN) 5 MG tablet 5 MG daily except on Tuesday's patient takes 1 & 1/2 (7.5 MG).      Allergies   Allergen Reactions     Blood Transfusion Related (Informational Only) Other (See Comments)     Patient has a history of a clinically significant antibody against RBC antigens.  A delay in compatible RBCs may occur.      Atenolol Unknown     Blood-Group Specific Substance      Anti-Jka present. Please allow up to 3 HRS for blood for transfusion. Draw 2 EDTA for all Type and Screen orders.     Buprenorphine Hcl [Buprenorphine] Other (See Comments)     pt. felt \"loopy\"     Codeine Unknown     Penicillins Unknown     Sulfa (Sulfonamide Antibiotics) [Sulfa Drugs] Unknown     Vancomycin Other (See Comments)     \"shuts down kidneys. Destroying venin\" per wife.         Lab Results    Chemistry/lipid CBC Cardiac Enzymes/BNP/TSH/INR   Recent Labs   Lab Test 07/30/21  0852   CHOL 128   HDL 28*   LDL 74   TRIG 129     Recent Labs   Lab Test 07/30/21  0852   LDL 74     Recent Labs   Lab Test 10/05/21  0708 10/05/21  0551 10/05/21  0112   NA  --  140  --    POTASSIUM  --  3.9  --    CHLORIDE  --  107  --    CO2  --  25  --    * 149*   < >   BUN  --  46*  --    CR  --  1.26*  --    GFRESTIMATED  --  55*  --    MARK  --  9.2  --     < > = values in this interval not displayed. "     Recent Labs   Lab Test 10/05/21  0551 10/04/21  1121 10/04/21  0748   CR 1.26* 1.19 1.09     Recent Labs   Lab Test 10/04/21  1121   A1C 6.3*    Recent Labs   Lab Test 10/05/21  0551   WBC 7.0   HGB 10.2*   HCT 31.5*   MCV 99   *     Recent Labs   Lab Test 10/05/21  0551 10/04/21  1121 10/04/21  0748   HGB 10.2* 11.0* 12.3*    No results for input(s): TROPONINI in the last 25417 hours.  Recent Labs   Lab Test 09/23/21  0928 07/14/21  1403 06/08/21  1546   BNP 1,129*  --  1,264*   NTBNP  --  7,991*  --      No results for input(s): TSH in the last 61827 hours.  Recent Labs   Lab Test 10/05/21  0551 10/04/21  0748 09/23/21 0928   INR 1.24* 1.13 2.43*        38 minutes spent on the date of encounter doing chart review, review of test results, interpretation with above tests, patient visit, documentation and discussion with family.      This note has been dictated using voice recognition software. Any grammatical or context distortions are unintentional and inherent to the software.

## 2022-10-07 NOTE — LETTER
10/7/2022    Salo Lewis MD  [id number]  [place] [address]    RE: Sorin Khouryson       Dear Colleague,     I had the pleasure of seeing Sorin Weeks in the [facility].  HEART CARE ENCOUNTER NOTE       [facility]  [phone]    Assessment/Recommendations   Diagnoses and all orders for this visit:    Hx of severe Mitral Regurgitation - he is now 1 year s/p MitraClip implantation with 2 clips.   He has had significant improvement in symptoms and overall functional staus     Chronic combined systolic and diastolic heart failure, NYHA class II -significantly improved with decreased lower extremity edema and no shortness of breath currently.  Continue Lasix 40 mg in AM and 20 mg at noon, as well as beta-blocker therapy and afterload reduction.    Type 2 diabetes mellitus -noninsulin dependent.  Patient takes Metformin and glipizide.  He has lost about 20 pounds in the past year and his last hemoglobin A1c was 4.5     Hypertension -blood pressure today is controlled.  Patient will continue taking lisinopril/hydrochlorothiazide 20/12.5 mg daily and metoprolol succinate 100 mg daily     Single-vessel coronary artery disease -asymptomatic.  Continue medical therapy.     History of severe aortic stenosis - s/p mechanical aortic valve replacement.  With mild PVL.     Thank you for the opportunity to participate in the care of Sorin Weeks. It's been a pleasure working with him.  Please do not hesitate to call with any questions or concerns.       History of Present Illness/Subjective    I had the opportunity to see Sorin Weeks at the [facility] for his 1 year follow-up visit after transcatheter mitral valve repair with MitraClip x2.  His wife accompanies him to the visit today.    Stephen has a history of valvular heart disease and underwent mechanical aortic valve replacement in 1996.  He has some mild PVL that has been  watched over several years.  He also has mitral regurgitation, combined systolic and diastolic heart failure, diabetes mellitus, hypertension, osteoarthritis and obstructive sleep apnea.      He underwent transcatheter mitral valve repair using 2 MitraClip's on October 4, 2021.  He improvement in symptoms early on, but was retaining fluid when he last saw Dr. Campos in April.  His Lasix was increased and this has helped significantly.  He has no more shortness of breath.  He does get mildly winded after walking the dogs, but can walk stairs without difficulty.    Stephen had 2 major falls in the past year.  One was this spring when he fell off the back deck at home and cracked a tooth.  The second was at the end of [place]  [place] resulted in a lacerated liver and broken ribs.  He was at [facility] for quite some time and required surgery.  He also tells me that in the last year since his clip, he has lost about 20 pounds    Sorin Weeks denies exertional chest discomfort, palpitations, shortness of breath at rest, PND, orthopnea, lightheadedness, dizziness, pre-syncope or syncope.  Sorin Weeks also denies any recent weight loss, changes in appetite, nausea or vomiting.   ____________________________________________________________  Echo from October 4:  Interpretation Summary     Left ventricular function is normal.The ejection fraction is 55-60%.  Mildly decreased right ventricular systolic function  Severe biatrial enlargement.  MitraClip is present with stable bileaflet insertion. There is mild mitral  stenosis with a mean gradient of 4 mmHg and probable mild/moderate highly  eccentric regurgitation.  There is a mechanical aortic valve. The prosthetic valve gradients are normal.  There is moderate paravalvular regurgitation present.  The right ventricular systolic pressure is approximated at 48mmHg plus the  right atrial  "pressure.  Compared to prior study, there is no significant change.     Physical Examination Review of Systems   Vitals: /50 (BP Location: Left arm, Patient Position: Sitting, Cuff Size: Adult Regular)   Pulse 62   Resp 16   Ht 1.651 m (5' 5\")   Wt 81.2 kg (179 lb)   BMI 29.79 kg/m    BMI= Body mass index is 29.79 kg/m .  Wt Readings from Last 3 Encounters:   10/07/22 81.2 kg (179 lb)   04/21/22 87.6 kg (193 lb 3.2 oz)   11/19/21 89.4 kg (197 lb)       General Appearance:   Alert, cooperative and in no acute distress   ENT/Mouth: membranes moist, no oral lesions or bleeding gums.      EYES:  no scleral icterus, normal conjunctivae   Neck: Supple without lymphadenopathy.  Thyroid not visualized   Chest/Lungs:   lungs are clear to auscultation, no rales or wheezing   Cardiovascular:   Regular. Normal first and second heart sounds with no murmurs, rubs, or gallops; the carotid, radial and posterior tibial pulses are intact, minimal edema bilaterally with compression stockings in place   Abdomen:  Soft and nontender. Bowel sounds are present in all quadrants   Extremities: no cyanosis or clubbing.  Puncture sites are still significantly bruised, but all soft with no hematoma.  Closure device palpated in right groin.    Skin: no xanthelasma, warm.    Neurologic: normal gait, normal  bilateral, no tremors   Psychiatric: Normal mood and affect       Please refer above for cardiac ROS details.      Medical History  Surgical History Family History Social History   Past Medical History:   Diagnosis Date     Diabetes mellitus (H)      Hypertension      Past Surgical History:   Procedure Laterality Date     APPENDECTOMY       CARDIAC CATHETERIZATION       CV CORONARY ANGIOGRAM N/A 7/30/2021    Procedure: Coronary Angiogram;  Surgeon: Leonel Romero MD;  Location: [facility] CATH LAB CV     CV LEFT HEART CATH N/A 7/30/2021    Procedure: Left Heart Cath;  Surgeon: Leonel Romero MD;  Location: [facility] " LAB CV     CV RIGHT HEART CATH MEASUREMENTS RECORDED N/A 7/30/2021    Procedure: Right Heart Cath;  Surgeon: Leonel Romero MD;  Location: [facility] CATH LAB CV     CV SUPRAVALVULAR AORTOGRAM N/A 7/30/2021    Procedure: Supravalvular Aortagram;  Surgeon: Leonel Romero MD;  Location: [facility] CATH LAB CV     INSERT / REPLACE / REMOVE PACEMAKER       TOTAL SHOULDER REPLACEMENT Left      TRANSCATHETER MITRAL VALVE REPAIR N/A 10/4/2021    Procedure: CV MITRAL CLIP;  Surgeon: Jacqueline Chilel MD;  Location:  HEART CARDIAC CATH LAB     ZZC REPLACE AORT VALV,PROSTH VALV      Description: Aortic Valve Replacement;  Recorded: 06/05/2013;     No family history on file. Social History     Socioeconomic History     Marital status:      Spouse name: Not on file     Number of children: Not on file     Years of education: Not on file     Highest education level: Not on file   Occupational History     Not on file   Tobacco Use     Smoking status: Former Smoker     Types: Cigarettes     Smokeless tobacco: Never Used   Substance and Sexual Activity     Alcohol use: Never     Drug use: Never     Sexual activity: Not on file   Other Topics Concern     Not on file   Social History Narrative     Not on file     Social Determinants of Health     Financial Resource Strain: Not on file   Food Insecurity: Not on file   Transportation Needs: Not on file   Physical Activity: Not on file   Stress: Not on file   Social Connections: Not on file   Intimate Partner Violence: Not on file   Housing Stability: Not on file          Medications  Allergies   Current Outpatient Medications   Medication Sig Dispense Refill     acetaminophen (TYLENOL) 500 MG tablet Take 1,000 mg by mouth 2 times daily       EMBRACE PRO GLUCOSE TEST test strip USE TO TEST TWICE A DAY       finasteride (PROSCAR) 5 mg tablet Take 5 mg by mouth daily   5     furosemide (LASIX) 20 MG tablet Take 2 tablets (40 mg) by mouth daily (Patient taking differently: Take  "40 mg by mouth daily Taking 2 tablets in the morning, 1 tablet at noon) 180 tablet 1     glipiZIDE (GLUCOTROL) 10 MG 24 hr tablet [GLIPIZIDE (GLUCOTROL) 10 MG 24 HR TABLET] Take 10 mg by mouth daily.       lisinopril-hydrochlorothiazide (PRINZIDE,ZESTORETIC) 20-12.5 mg per tablet [LISINOPRIL-HYDROCHLOROTHIAZIDE (PRINZIDE,ZESTORETIC) 20-12.5 MG PER TABLET] Take 1 tablet by mouth daily.  0     metFORMIN (GLUCOPHAGE) 500 MG tablet Take 500 mg by mouth 2 times daily (with meals)        metoprolol succinate (TOPROL-XL) 100 MG 24 hr tablet [METOPROLOL SUCCINATE (TOPROL-XL) 100 MG 24 HR TABLET] Take 100 mg by mouth daily.       multivit,calc,mins/folic acid (ONE-A-DAY PROACTIVE 65 PLUS ORAL) [MULTIVIT,CALC,MINS/FOLIC ACID (ONE-A-DAY PROACTIVE 65 PLUS ORAL)] Take 1 tablet by mouth daily.       tamsulosin (FLOMAX) 0.4 mg Cp24 Take 0.4 mg by mouth daily (with breakfast)   5     warfarin (COUMADIN) 5 MG tablet 5 MG daily except on Tuesday's patient takes 1 & 1/2 (7.5 MG).      Allergies   Allergen Reactions     Blood Transfusion Related (Informational Only) Other (See Comments)     Patient has a history of a clinically significant antibody against RBC antigens.  A delay in compatible RBCs may occur.      Atenolol Unknown     Blood-Group Specific Substance      Anti-Jka present. Please allow up to 3 HRS for blood for transfusion. Draw 2 EDTA for all Type and Screen orders.     Buprenorphine Hcl [Buprenorphine] Other (See Comments)     pt. felt \"loopy\"     Codeine Unknown     Penicillins Unknown     Sulfa (Sulfonamide Antibiotics) [Sulfa Drugs] Unknown     Vancomycin Other (See Comments)     \"shuts down kidneys. Destroying venin\" per wife.         Lab Results    Chemistry/lipid CBC Cardiac Enzymes/BNP/TSH/INR   Recent Labs   Lab Test 07/30/21  0852   CHOL 128   HDL 28*   LDL 74   TRIG 129     Recent Labs   Lab Test 07/30/21  0852   LDL 74     Recent Labs   Lab Test 10/05/21  0708 10/05/21  0551 10/05/21  0112   NA  --  140  --  "   POTASSIUM  --  3.9  --    CHLORIDE  --  107  --    CO2  --  25  --    * 149*   < >   BUN  --  46*  --    CR  --  1.26*  --    GFRESTIMATED  --  55*  --    MARK  --  9.2  --     < > = values in this interval not displayed.     Recent Labs   Lab Test 10/05/21  0551 10/04/21  1121 10/04/21  0748   CR 1.26* 1.19 1.09     Recent Labs   Lab Test 10/04/21  1121   A1C 6.3*    Recent Labs   Lab Test 10/05/21  0551   WBC 7.0   HGB 10.2*   HCT 31.5*   MCV 99   *     Recent Labs   Lab Test 10/05/21  0551 10/04/21  1121 10/04/21  0748   HGB 10.2* 11.0* 12.3*    No results for input(s): TROPONINI in the last 36476 hours.  Recent Labs   Lab Test 09/23/21  0928 07/14/21  1403 06/08/21  1546   BNP 1,129*  --  1,264*   NTBNP  --  7,991*  --      No results for input(s): TSH in the last 20822 hours.  Recent Labs   Lab Test 10/05/21  0551 10/04/21  0748 09/23/21  0928   INR 1.24* 1.13 2.43*        38 minutes spent on the date of encounter doing chart review, review of test results, interpretation with above tests, patient visit, documentation and discussion with family.      This note has been dictated using voice recognition software. Any grammatical or context distortions are unintentional and inherent to the software.            Thank you for allowing me to participate in the care of your patient.      Sincerely,     Smita Mtaos PA-C     [facility]  cc:   No referring provider defined for this encounter.

## 2022-10-16 ENCOUNTER — HEALTH MAINTENANCE LETTER (OUTPATIENT)
Age: 77
End: 2022-10-16

## 2022-10-19 DIAGNOSIS — Z95.0 CARDIAC PACEMAKER IN SITU: Primary | ICD-10-CM

## 2022-10-19 DIAGNOSIS — I44.0 AV BLOCK, 1ST DEGREE: ICD-10-CM

## 2023-01-01 ENCOUNTER — HEALTH MAINTENANCE LETTER (OUTPATIENT)
Age: 78
End: 2023-01-01

## 2023-01-01 ENCOUNTER — LAB REQUISITION (OUTPATIENT)
Dept: LAB | Facility: CLINIC | Age: 78
End: 2023-01-01

## 2023-01-01 DIAGNOSIS — I35.9 NONRHEUMATIC AORTIC VALVE DISORDER, UNSPECIFIED: ICD-10-CM

## 2023-01-01 DIAGNOSIS — I48.91 UNSPECIFIED ATRIAL FIBRILLATION (H): ICD-10-CM

## 2023-01-01 DIAGNOSIS — Z51.81 ENCOUNTER FOR THERAPEUTIC DRUG LEVEL MONITORING: ICD-10-CM

## 2023-01-01 LAB
ANION GAP SERPL CALCULATED.3IONS-SCNC: 13 MMOL/L (ref 7–15)
BUN SERPL-MCNC: 69.4 MG/DL (ref 8–23)
CALCIUM SERPL-MCNC: 10 MG/DL (ref 8.8–10.2)
CHLORIDE SERPL-SCNC: 103 MMOL/L (ref 98–107)
CREAT SERPL-MCNC: 1.72 MG/DL (ref 0.67–1.17)
DEPRECATED HCO3 PLAS-SCNC: 25 MMOL/L (ref 22–29)
EGFRCR SERPLBLD CKD-EPI 2021: 40 ML/MIN/1.73M2
GLUCOSE SERPL-MCNC: 102 MG/DL (ref 70–99)
INR PPP: 2.24 (ref 0.85–1.15)
INR PPP: 2.79 (ref 0.85–1.15)
INR PPP: 3.35 (ref 0.85–1.15)
POTASSIUM SERPL-SCNC: 4.3 MMOL/L (ref 3.4–5.3)
SODIUM SERPL-SCNC: 141 MMOL/L (ref 135–145)

## 2023-01-01 PROCEDURE — 36415 COLL VENOUS BLD VENIPUNCTURE: CPT | Performed by: HOSPITALIST

## 2023-01-01 PROCEDURE — 85610 PROTHROMBIN TIME: CPT | Performed by: FAMILY MEDICINE

## 2023-01-01 PROCEDURE — P9603 ONE-WAY ALLOW PRORATED MILES: HCPCS | Performed by: HOSPITALIST

## 2023-01-01 PROCEDURE — 85610 PROTHROMBIN TIME: CPT | Performed by: HOSPITALIST

## 2023-01-01 PROCEDURE — 36415 COLL VENOUS BLD VENIPUNCTURE: CPT | Performed by: FAMILY MEDICINE

## 2023-01-01 PROCEDURE — P9604 ONE-WAY ALLOW PRORATED TRIP: HCPCS | Performed by: NURSE PRACTITIONER

## 2023-01-01 PROCEDURE — 36415 COLL VENOUS BLD VENIPUNCTURE: CPT | Performed by: NURSE PRACTITIONER

## 2023-01-01 PROCEDURE — 80048 BASIC METABOLIC PNL TOTAL CA: CPT | Performed by: NURSE PRACTITIONER

## 2023-01-01 PROCEDURE — P9604 ONE-WAY ALLOW PRORATED TRIP: HCPCS | Performed by: FAMILY MEDICINE

## 2023-01-01 PROCEDURE — P9604 ONE-WAY ALLOW PRORATED TRIP: HCPCS | Performed by: HOSPITALIST

## 2023-02-07 ENCOUNTER — TRANSFERRED RECORDS (OUTPATIENT)
Dept: HEALTH INFORMATION MANAGEMENT | Facility: CLINIC | Age: 78
End: 2023-02-07
Payer: MEDICARE

## 2023-03-26 ENCOUNTER — HEALTH MAINTENANCE LETTER (OUTPATIENT)
Age: 78
End: 2023-03-26

## 2023-05-15 ENCOUNTER — ANCILLARY PROCEDURE (OUTPATIENT)
Dept: CARDIOLOGY | Facility: CLINIC | Age: 78
End: 2023-05-15
Attending: INTERNAL MEDICINE
Payer: MEDICARE

## 2023-05-15 DIAGNOSIS — Z95.0 PACEMAKER: ICD-10-CM

## 2023-05-15 DIAGNOSIS — I44.0 AV BLOCK, 1ST DEGREE: ICD-10-CM

## 2023-05-15 LAB
MDC_IDC_LEAD_IMPLANT_DT: NORMAL
MDC_IDC_LEAD_LOCATION: NORMAL
MDC_IDC_LEAD_LOCATION_DETAIL_1: NORMAL
MDC_IDC_LEAD_MFG: NORMAL
MDC_IDC_LEAD_MODEL: NORMAL
MDC_IDC_LEAD_POLARITY_TYPE: NORMAL
MDC_IDC_LEAD_SERIAL: NORMAL
MDC_IDC_MSMT_BATTERY_DTM: NORMAL
MDC_IDC_MSMT_BATTERY_REMAINING_LONGEVITY: 28 MO
MDC_IDC_MSMT_BATTERY_REMAINING_PERCENTAGE: 22 %
MDC_IDC_MSMT_BATTERY_RRT_TRIGGER: NORMAL
MDC_IDC_MSMT_BATTERY_STATUS: NORMAL
MDC_IDC_MSMT_BATTERY_VOLTAGE: 2.84 V
MDC_IDC_MSMT_LEADCHNL_RV_IMPEDANCE_VALUE: 430 OHM
MDC_IDC_MSMT_LEADCHNL_RV_LEAD_CHANNEL_STATUS: NORMAL
MDC_IDC_MSMT_LEADCHNL_RV_PACING_THRESHOLD_AMPLITUDE: 1.12 V
MDC_IDC_MSMT_LEADCHNL_RV_PACING_THRESHOLD_PULSEWIDTH: 0.6 MS
MDC_IDC_MSMT_LEADCHNL_RV_SENSING_INTR_AMPL: 7 MV
MDC_IDC_PG_IMPLANT_DTM: NORMAL
MDC_IDC_PG_MFG: NORMAL
MDC_IDC_PG_MODEL: NORMAL
MDC_IDC_PG_SERIAL: NORMAL
MDC_IDC_PG_TYPE: NORMAL
MDC_IDC_SESS_CLINIC_NAME: NORMAL
MDC_IDC_SESS_DTM: NORMAL
MDC_IDC_SESS_REPROGRAMMED: NO
MDC_IDC_SESS_TYPE: NORMAL
MDC_IDC_SET_BRADY_LOWRATE: 60 {BEATS}/MIN
MDC_IDC_SET_BRADY_MAX_SENSOR_RATE: 130 {BEATS}/MIN
MDC_IDC_SET_BRADY_MODE: NORMAL
MDC_IDC_SET_LEADCHNL_RV_PACING_AMPLITUDE: 1.38
MDC_IDC_SET_LEADCHNL_RV_PACING_ANODE_ELECTRODE_1: NORMAL
MDC_IDC_SET_LEADCHNL_RV_PACING_ANODE_LOCATION_1: NORMAL
MDC_IDC_SET_LEADCHNL_RV_PACING_CAPTURE_MODE: NORMAL
MDC_IDC_SET_LEADCHNL_RV_PACING_CATHODE_ELECTRODE_1: NORMAL
MDC_IDC_SET_LEADCHNL_RV_PACING_CATHODE_LOCATION_1: NORMAL
MDC_IDC_SET_LEADCHNL_RV_PACING_POLARITY: NORMAL
MDC_IDC_SET_LEADCHNL_RV_PACING_PULSEWIDTH: 0.6 MS
MDC_IDC_SET_LEADCHNL_RV_SENSING_ADAPTATION_MODE: NORMAL
MDC_IDC_SET_LEADCHNL_RV_SENSING_ANODE_ELECTRODE_1: NORMAL
MDC_IDC_SET_LEADCHNL_RV_SENSING_ANODE_LOCATION_1: NORMAL
MDC_IDC_SET_LEADCHNL_RV_SENSING_CATHODE_ELECTRODE_1: NORMAL
MDC_IDC_SET_LEADCHNL_RV_SENSING_CATHODE_LOCATION_1: NORMAL
MDC_IDC_SET_LEADCHNL_RV_SENSING_POLARITY: NORMAL
MDC_IDC_SET_LEADCHNL_RV_SENSING_SENSITIVITY: 2 MV
MDC_IDC_STAT_AT_DTM_END: NORMAL
MDC_IDC_STAT_AT_DTM_START: NORMAL
MDC_IDC_STAT_BRADY_DTM_END: NORMAL
MDC_IDC_STAT_BRADY_DTM_START: NORMAL
MDC_IDC_STAT_BRADY_RV_PERCENT_PACED: 94 %
MDC_IDC_STAT_CRT_DTM_END: NORMAL
MDC_IDC_STAT_CRT_DTM_START: NORMAL
MDC_IDC_STAT_HEART_RATE_DTM_END: NORMAL
MDC_IDC_STAT_HEART_RATE_DTM_START: NORMAL
MDC_IDC_STAT_HEART_RATE_VENTRICULAR_MAX: 240 {BEATS}/MIN
MDC_IDC_STAT_HEART_RATE_VENTRICULAR_MEAN: 71 {BEATS}/MIN
MDC_IDC_STAT_HEART_RATE_VENTRICULAR_MIN: 50 {BEATS}/MIN

## 2023-05-15 PROCEDURE — 93296 REM INTERROG EVL PM/IDS: CPT | Performed by: INTERNAL MEDICINE

## 2023-05-15 PROCEDURE — 93294 REM INTERROG EVL PM/LDLS PM: CPT | Performed by: INTERNAL MEDICINE

## 2024-01-01 ENCOUNTER — HEALTH MAINTENANCE LETTER (OUTPATIENT)
Age: 79
End: 2024-01-01

## (undated) DEVICE — 71CM NRG TRANSSEPTAL NEEDLE, C0, 8F OR 8.5F FIXED CURVE - 63CM

## (undated) DEVICE — SHTH INTRO 0.021IN ID 6FR DIA

## (undated) DEVICE — MITRACLIP CATHETER GUIDE STEERABLE SGC0701

## (undated) DEVICE — CATH ANGIO INFINITI JL5 4FRX100CM 538422

## (undated) DEVICE — SYR ANGIOGRAPHY MULTIUSE KIT ACIST 014612

## (undated) DEVICE — INTRO SHEATH 6FRX10CM PINNACLE RSS602

## (undated) DEVICE — INTRO MICRO MINI STICK 4FR STIFF NITINOL 45-753

## (undated) DEVICE — SHEATH GLIDE RADIAL 5FR 10CM .021

## (undated) DEVICE — CATH DIAG 4FR ANG PIG 538453S

## (undated) DEVICE — CUSTOM PACK CORONARY SAN5BCRHEA

## (undated) DEVICE — DILATOR VASCULAR 16FRX20CM G01212

## (undated) DEVICE — GUIDEWIRE VASCULAR 0.035IN DIA 145CML 15CML/6CML STAINLESS

## (undated) DEVICE — DILATOR VASCULAR 20FRX20CM G01471

## (undated) DEVICE — SHEATH 4FR ULTIMUM 407840

## (undated) DEVICE — CLOSURE ANGIOSEAL 6FR 610130

## (undated) DEVICE — KIT HAND CONTROL ACIST 014644 AR-P54

## (undated) DEVICE — CATH ANGIO INFINITI JL4 4FRX100CM 538420

## (undated) DEVICE — CATH ANGIO INFINITI JR4 4FRX100CM 538421

## (undated) DEVICE — CATH ANGIO INFINITI AL2 4FRX100CM 538446

## (undated) DEVICE — CATH ANGIO WEDGE PRESSURE 5FRX110CM DL AI-07124

## (undated) DEVICE — GUIDEWIRE VASCULAR COMET II 185CML PRESSURE H74939359110

## (undated) DEVICE — CATH DIAGNOSTIC RADIAL 5FR TIG 4.0

## (undated) DEVICE — MANIFOLD CUSTOM KIT FOR CTO PROCEDURE K09-11989

## (undated) DEVICE — DEVICE MULTI TORQUE TD01

## (undated) DEVICE — VALVE HEMOSTASIS .096" COPILOT MECH 1003331

## (undated) DEVICE — ELECTRODE ADULT PACING MULTI P-211-M1

## (undated) DEVICE — INTRO SHEATH 4FRX10CM PINNACLE RSS402

## (undated) DEVICE — CLOSURE DEVICE 6FR VASC PROGLIDE MEDICATED SUTURE 12673-03

## (undated) DEVICE — CATH ANGIO INFINITI AR2 MOD 4FRX100CM 538443

## (undated) DEVICE — MANIFOLD KIT ANGIO AUTOMATED 014613

## (undated) DEVICE — SLEEVE TR BAND RADIAL COMPRESSION DEVICE 24CM TRB24-REG

## (undated) DEVICE — GUIDEWIRE VASC SAFARI2 0.035X275CM H74939406XS1

## (undated) DEVICE — SHEATH ULTIMUM 6FR 12CM 407845

## (undated) DEVICE — CATH DIAG 4FR AL 3 538447

## (undated) DEVICE — GUIDEWIRE FORTE FLOPPY J TOP 34949-05J

## (undated) DEVICE — INTRO SHEATH 8FRX10CM PINNACLE RSS802

## (undated) DEVICE — PACK HEART LEFT CUSTOM

## (undated) DEVICE — Device

## (undated) RX ORDER — LIDOCAINE HYDROCHLORIDE 20 MG/ML
INJECTION, SOLUTION EPIDURAL; INFILTRATION; INTRACAUDAL; PERINEURAL
Status: DISPENSED
Start: 2021-10-04

## (undated) RX ORDER — ROCURONIUM BROMIDE 50 MG/5 ML
SYRINGE (ML) INTRAVENOUS
Status: DISPENSED
Start: 2021-10-04

## (undated) RX ORDER — FENTANYL CITRATE 50 UG/ML
INJECTION, SOLUTION INTRAMUSCULAR; INTRAVENOUS
Status: DISPENSED
Start: 2021-10-04

## (undated) RX ORDER — PROPOFOL 10 MG/ML
INJECTION, EMULSION INTRAVENOUS
Status: DISPENSED
Start: 2021-10-04

## (undated) RX ORDER — DEXAMETHASONE SODIUM PHOSPHATE 4 MG/ML
INJECTION, SOLUTION INTRA-ARTICULAR; INTRALESIONAL; INTRAMUSCULAR; INTRAVENOUS; SOFT TISSUE
Status: DISPENSED
Start: 2021-10-04

## (undated) RX ORDER — SODIUM CHLORIDE 9 MG/ML
INJECTION, SOLUTION INTRAVENOUS
Status: DISPENSED
Start: 2021-10-04

## (undated) RX ORDER — ASPIRIN 325 MG
TABLET ORAL
Status: DISPENSED
Start: 2021-10-04

## (undated) RX ORDER — DIAZEPAM 5 MG
TABLET ORAL
Status: DISPENSED
Start: 2021-07-30

## (undated) RX ORDER — PROTAMINE SULFATE 10 MG/ML
INJECTION, SOLUTION INTRAVENOUS
Status: DISPENSED
Start: 2021-10-04

## (undated) RX ORDER — CLINDAMYCIN PHOSPHATE 900 MG/50ML
INJECTION, SOLUTION INTRAVENOUS
Status: DISPENSED
Start: 2021-10-04

## (undated) RX ORDER — HEPARIN SODIUM 1000 [USP'U]/ML
INJECTION, SOLUTION INTRAVENOUS; SUBCUTANEOUS
Status: DISPENSED
Start: 2021-10-04

## (undated) RX ORDER — ACETAMINOPHEN 325 MG/1
TABLET ORAL
Status: DISPENSED
Start: 2021-07-30

## (undated) RX ORDER — FENTANYL CITRATE 50 UG/ML
INJECTION, SOLUTION INTRAMUSCULAR; INTRAVENOUS
Status: DISPENSED
Start: 2021-07-30

## (undated) RX ORDER — ONDANSETRON 2 MG/ML
INJECTION INTRAMUSCULAR; INTRAVENOUS
Status: DISPENSED
Start: 2021-10-04